# Patient Record
Sex: MALE | Race: WHITE | NOT HISPANIC OR LATINO | Employment: OTHER | URBAN - METROPOLITAN AREA
[De-identification: names, ages, dates, MRNs, and addresses within clinical notes are randomized per-mention and may not be internally consistent; named-entity substitution may affect disease eponyms.]

---

## 2017-01-05 RX ORDER — ACYCLOVIR 400 MG/1
400 TABLET ORAL 3 TIMES DAILY
Qty: 15 TABLET | Refills: 0 | Status: SHIPPED | OUTPATIENT
Start: 2017-01-05 | End: 2017-02-17 | Stop reason: SDUPTHER

## 2017-01-20 RX ORDER — LANCETS 28 GAUGE
EACH MISCELLANEOUS
Qty: 90 EACH | Refills: 3 | Status: SHIPPED | OUTPATIENT
Start: 2017-01-20 | End: 2017-01-31 | Stop reason: SDUPTHER

## 2017-01-23 ENCOUNTER — TELEPHONE (OUTPATIENT)
Dept: INTERNAL MEDICINE | Facility: CLINIC | Age: 57
End: 2017-01-23

## 2017-01-23 NOTE — TELEPHONE ENCOUNTER
----- Message from Tess Mayes sent at 1/23/2017  9:28 AM EST -----  THE PATIENT IS STATING THAT HIS INSURANCE WILL NOT COVER THE MEDICATION JANUVIA AND WOULD LIKE FOR YOU TO CALL SOMETHING ELSE IN FOR HIM. THE PATIENT CAN B E REACHED [phone]

## 2017-01-26 DIAGNOSIS — E11.42 DIABETIC PERIPHERAL NEUROPATHY (HCC): ICD-10-CM

## 2017-01-26 RX ORDER — GABAPENTIN 400 MG/1
CAPSULE ORAL
Qty: 180 CAPSULE | Refills: 5 | Status: SHIPPED | OUTPATIENT
Start: 2017-01-26 | End: 2017-07-31 | Stop reason: SDUPTHER

## 2017-01-27 RX ORDER — TAMSULOSIN HYDROCHLORIDE 0.4 MG/1
CAPSULE ORAL
Qty: 180 CAPSULE | Refills: 1 | Status: SHIPPED | OUTPATIENT
Start: 2017-01-27 | End: 2017-07-31 | Stop reason: SDUPTHER

## 2017-01-31 ENCOUNTER — OFFICE VISIT (OUTPATIENT)
Dept: INTERNAL MEDICINE | Facility: CLINIC | Age: 57
End: 2017-01-31

## 2017-01-31 VITALS
DIASTOLIC BLOOD PRESSURE: 64 MMHG | WEIGHT: 256.8 LBS | HEART RATE: 87 BPM | SYSTOLIC BLOOD PRESSURE: 100 MMHG | BODY MASS INDEX: 34.78 KG/M2 | OXYGEN SATURATION: 98 % | HEIGHT: 72 IN

## 2017-01-31 DIAGNOSIS — E03.9 ACQUIRED HYPOTHYROIDISM: ICD-10-CM

## 2017-01-31 DIAGNOSIS — I10 ESSENTIAL HYPERTENSION: ICD-10-CM

## 2017-01-31 DIAGNOSIS — K62.89 RECTAL PAIN: ICD-10-CM

## 2017-01-31 DIAGNOSIS — E55.9 VITAMIN D DEFICIENCY: ICD-10-CM

## 2017-01-31 DIAGNOSIS — Z11.59 SCREENING FOR VIRAL DISEASE: ICD-10-CM

## 2017-01-31 DIAGNOSIS — J44.1 CHRONIC OBSTRUCTIVE PULMONARY DISEASE WITH ACUTE EXACERBATION (HCC): ICD-10-CM

## 2017-01-31 DIAGNOSIS — E11.69 TYPE 2 DIABETES MELLITUS WITH OTHER SPECIFIED COMPLICATION (HCC): Primary | ICD-10-CM

## 2017-01-31 PROCEDURE — 99213 OFFICE O/P EST LOW 20 MIN: CPT | Performed by: INTERNAL MEDICINE

## 2017-01-31 RX ORDER — LANCETS 28 GAUGE
EACH MISCELLANEOUS
Qty: 100 EACH | Refills: 3 | Status: SHIPPED | OUTPATIENT
Start: 2017-01-31 | End: 2019-12-23 | Stop reason: SDUPTHER

## 2017-01-31 RX ORDER — LEVOTHYROXINE SODIUM 88 UG/1
88 TABLET ORAL DAILY
Qty: 90 TABLET | Refills: 1 | Status: SHIPPED | OUTPATIENT
Start: 2017-01-31 | End: 2017-08-01 | Stop reason: SDUPTHER

## 2017-01-31 NOTE — PROGRESS NOTES
Diabetes (started tradjenta on the 23rd)    Subjective   Carmelo Saucedo is a 56 y.o. male is here today for follow-up.    History of Present Illness   Carmelo is here for a follow up on his DM2, was restarted on Januvia last visit, but since not covered , was switched to Tradjenta. Sugars are much better on that.  He reports increased HA and stress, anxiety and depression, followed by Psychiatry.  Saw opthalmology, having neck pain, unsure if causing his headaches.  Needs DM supplies, adv. To have pharmacy send it again.  Colonoscopy scheduled for Feb 24th. Having a lot of pain in his rectum      Current Outpatient Prescriptions:   •  acyclovir (ZOVIRAX) 400 MG tablet, Take 1 tablet by mouth 3 (Three) Times a Day. Take no more than 5 doses a day., Disp: 15 tablet, Rfl: 0  •  AFLURIA PRESERVATIVE FREE 0.5 ML suspension prefilled syringe, inject 0.5 milliliter intramuscularly, Disp: , Rfl: 0  •  albuterol (ACCUNEB) 1.25 MG/3ML nebulizer solution, Take 3 mL by nebulization every 6 (six) hours as needed for wheezing., Disp: 120 vial, Rfl: 5  •  atorvastatin (LIPITOR) 20 MG tablet, Take 1 tablet by mouth daily., Disp: 90 tablet, Rfl: 1  •  budesonide-formoterol (SYMBICORT) 160-4.5 MCG/ACT inhaler, Inhale 2 puffs 2 (Two) Times a Day. Rinse mouth after use., Disp: 3 inhaler, Rfl: 1  •  Canagliflozin (INVOKANA) 300 MG tablet, Take 300 mg by mouth Daily., Disp: 90 tablet, Rfl: 1  •  Cholecalciferol (VITAMIN D3) 5000 UNITS capsule capsule, Take 1 capsule by mouth daily., Disp: 30 capsule, Rfl: 5  •  clonazePAM (KlonoPIN) 0.5 MG tablet, Take 1 tablet by mouth 3 (three) times a day., Disp: , Rfl: 0  •  gabapentin (NEURONTIN) 400 MG capsule, take 2 capsules by mouth three times a day, Disp: 180 capsule, Rfl: 5  •  glucose blood (NADEEM CONTOUR NEXT TEST) test strip, 1 strip daily., Disp: , Rfl:   •  hydrochlorothiazide (MICROZIDE) 12.5 MG capsule, Take 1 capsule by mouth daily., Disp: 90 capsule, Rfl: 1  •  ipratropium-albuterol  (COMBIVENT RESPIMAT)  MCG/ACT inhaler, Inhale 1 puff 4 (Four) Times a Day., Disp: 12 g, Rfl: 1  •  Lancets (FREESTYLE) lancets, FOR ONCE DAILY TESTING, Disp: 90 each, Rfl: 3  •  levothyroxine (SYNTHROID, LEVOTHROID) 88 MCG tablet, Take 1 tablet by mouth Daily. Take 1 tablet daily on empty stomach preferably take 1/2 tablet 1 hour before breakfast., Disp: 90 tablet, Rfl: 1  •  linagliptin (TRADJENTA) 5 MG tablet tablet, Take 1 tablet by mouth Daily., Disp: 30 tablet, Rfl: 0  •  lisinopril (PRINIVIL,ZESTRIL) 5 MG tablet, Take 1 tablet by mouth Daily., Disp: 90 tablet, Rfl: 1  •  lithium 600 MG capsule, Take 1 capsule by mouth 3 (Three) Times a Day., Disp: , Rfl: 0  •  metFORMIN (GLUCOPHAGE) 1000 MG tablet, take 1.5 tablet every morning and take 1 tablet IN THE EVENING with food, Disp: 225 tablet, Rfl: 1  •  metoprolol succinate XL (TOPROL-XL) 50 MG 24 hr tablet, Take 1 tablet by mouth daily., Disp: 90 tablet, Rfl: 1  •  nitroglycerin (NITROSTAT) 0.4 MG SL tablet, Place 1 tablet under the tongue every 5 (five) minutes. Only take up to 3 tablets. Call 911 if pain persists., Disp: , Rfl:   •  pantoprazole (PROTONIX) 40 MG EC tablet, Take 1 tablet by mouth Daily., Disp: 90 tablet, Rfl: 1  •  pregabalin (LYRICA) 100 MG capsule, Take 1 capsule by mouth 2 (two) times a day for 180 days. Fill on or after 10/31/16, Disp: 180 capsule, Rfl: 1  •  QUEtiapine (SEROquel) 400 MG tablet, Take 2 tablets by mouth every night., Disp: , Rfl:   •  tamsulosin (FLOMAX) 0.4 MG capsule 24 hr capsule, take 2 capsules by mouth every NIGHT, Disp: 180 capsule, Rfl: 1  •  traZODone (DESYREL) 50 MG tablet, Take 1 tablet by mouth every night., Disp: , Rfl:   •  venlafaxine XR (EFFEXOR-XR) 150 MG 24 hr capsule, Take 1 capsule by mouth daily., Disp: , Rfl:   •  venlafaxine XR (EFFEXOR-XR) 75 MG 24 hr capsule, , Disp: , Rfl:   •  hydrocortisone (ANUSOL-HC) 2.5 % rectal cream, Insert  into the rectum 2 (Two) Times a Day., Disp: 30 g, Rfl:  "3      The following portions of the patient's history were reviewed and updated as appropriate: allergies, current medications, past family history, past medical history, past social history, past surgical history and problem list.    Review of Systems   Constitutional: Negative.  Negative for chills and fever.   HENT: Negative for ear discharge, ear pain, sinus pressure and sore throat.    Respiratory: Positive for wheezing. Negative for cough, chest tightness and shortness of breath.    Cardiovascular: Negative for chest pain, palpitations and leg swelling.   Gastrointestinal: Negative for diarrhea, nausea and vomiting.   Musculoskeletal: Positive for arthralgias and neck pain. Negative for back pain and myalgias.   Neurological: Positive for headaches. Negative for dizziness and syncope.   Psychiatric/Behavioral: Positive for sleep disturbance. Negative for confusion. The patient is nervous/anxious.        Objective   Visit Vitals   • /64   • Pulse 87   • Ht 72\" (182.9 cm)   • Wt 256 lb 12.8 oz (116 kg)   • SpO2 98%  Comment: ra   • BMI 34.83 kg/m2     Physical Exam   Constitutional: He is oriented to person, place, and time. He appears well-developed and well-nourished.   HENT:   Head: Normocephalic and atraumatic.   Right Ear: External ear normal.   Left Ear: External ear normal.   Mouth/Throat: No oropharyngeal exudate.   Eyes: Conjunctivae are normal. Pupils are equal, round, and reactive to light.   Neck: Neck supple. No thyromegaly present.   Cardiovascular: Normal rate and regular rhythm.    Pulmonary/Chest: Effort normal and breath sounds normal.   Abdominal: Soft. Bowel sounds are normal. He exhibits no distension. There is no tenderness.   Musculoskeletal: He exhibits tenderness. He exhibits no edema.   Neurological: He is alert and oriented to person, place, and time. No cranial nerve deficit.   Skin: Skin is warm and dry.   Psychiatric: He has a normal mood and affect. Judgment normal. "   Nursing note and vitals reviewed.        Results for orders placed or performed in visit on 12/20/16   POC Glycated Hemoglobin, Total   Result Value Ref Range    Hemoglobin A1C 8.7 %    Lot Number [id number]     Expiration Date 6/2018              Assessment/Plan   Diagnoses and all orders for this visit:    Type 2 diabetes mellitus with other specified complication    Chronic obstructive pulmonary disease with acute exacerbation  -     ipratropium-albuterol (COMBIVENT RESPIMAT)  MCG/ACT inhaler; Inhale 1 puff 4 (Four) Times a Day.    Essential hypertension  -     Comprehensive Metabolic Panel; Future  -     Lipid Panel; Future    Acquired hypothyroidism  -     levothyroxine (SYNTHROID, LEVOTHROID) 88 MCG tablet; Take 1 tablet by mouth Daily. Take 1 tablet daily on empty stomach preferably take 1/2 tablet 1 hour before breakfast.  -     TSH; Future  -     T4, Free; Future    Rectal pain  Comments:  Anusol cream , adv. to use prn sparingly.  Orders:  -     hydrocortisone (ANUSOL-HC) 2.5 % rectal cream; Insert  into the rectum 2 (Two) Times a Day.  -     CBC (No Diff); Future    Vitamin D deficiency  -     Vitamin D 25 Hydroxy; Future    Screening for viral disease  -     Hepatitis C Antibody; Future               Return in about 3 months (around 4/30/2017) for Medicare Wellness.

## 2017-01-31 NOTE — MR AVS SNAPSHOT
Carmelo Saucedo   1/31/2017 8:15 AM   Office Visit    Dept Phone:  [phone]   Encounter #:  [id number]    Provider:  Gladys Tello MD   Department:  [facility] INTERNAL MEDICINE AND ENDOCRINOLOGY [place]                Your Full Care Plan              Today's Medication Changes          These changes are accurate as of: 1/31/17  9:19 AM.  If you have any questions, ask your nurse or doctor.               New Medication(s)Ordered:     hydrocortisone 2.5 % rectal cream   Commonly known as:  ANUSOL-HC   Insert  into the rectum 2 (Two) Times a Day.         Stop taking medication(s)listed here:     doxycycline 100 MG capsule   Commonly known as:  VIBRAMYCIN                Where to Get Your Medications      These medications were sent to [address] [phone] [place] [id number] [address] [address]     Phone:  [phone]     hydrocortisone 2.5 % rectal cream    ipratropium-albuterol  MCG/ACT inhaler    levothyroxine 88 MCG tablet                  Your Updated Medication List          This list is accurate as of: 1/31/17  9:19 AM.  Always use your most recent med list.                acyclovir 400 MG tablet   Commonly known as:  ZOVIRAX   Take 1 tablet by mouth 3 (Three) Times a Day. Take no more than 5 doses a day.       AFLURIA PRESERVATIVE FREE 0.5 ML suspension prefilled syringe   Generic drug:  influenza virus vacc split PF       albuterol 1.25 MG/3ML nebulizer solution   Commonly known as:  ACCUNEB   Take 3 mL by nebulization every 6 (six) hours as needed for wheezing.       atorvastatin 20 MG tablet   Commonly known as:  LIPITOR   Take 1 tablet by mouth daily.       NADEEM CONTOUR NEXT TEST test strip   Generic drug:  glucose blood       budesonide-formoterol 160-4.5 MCG/ACT inhaler   Commonly known as:  SYMBICORT   Inhale 2 puffs 2 (Two) Times a Day. Rinse mouth after  use.       Canagliflozin 300 MG tablet   Commonly known as:  INVOKANA   Take 300 mg by mouth Daily.       clonazePAM 0.5 MG tablet   Commonly known as:  KlonoPIN       freestyle lancets   FOR ONCE DAILY TESTING       gabapentin 400 MG capsule   Commonly known as:  NEURONTIN   take 2 capsules by mouth three times a day       hydrochlorothiazide 12.5 MG capsule   Commonly known as:  MICROZIDE   Take 1 capsule by mouth daily.       hydrocortisone 2.5 % rectal cream   Commonly known as:  ANUSOL-HC   Insert  into the rectum 2 (Two) Times a Day.       ipratropium-albuterol  MCG/ACT inhaler   Commonly known as:  COMBIVENT RESPIMAT   Inhale 1 puff 4 (Four) Times a Day.       levothyroxine 88 MCG tablet   Commonly known as:  SYNTHROID, LEVOTHROID   Take 1 tablet by mouth Daily. Take 1 tablet daily on empty stomach preferably take 1/2 tablet 1 hour before breakfast.       linagliptin 5 MG tablet tablet   Commonly known as:  TRADJENTA   Take 1 tablet by mouth Daily.       lisinopril 5 MG tablet   Commonly known as:  PRINIVIL,ZESTRIL   Take 1 tablet by mouth Daily.       lithium 600 MG capsule       metFORMIN 1000 MG tablet   Commonly known as:  GLUCOPHAGE   take 1.5 tablet every morning and take 1 tablet IN THE EVENING with food       metoprolol succinate XL 50 MG 24 hr tablet   Commonly known as:  TOPROL-XL   Take 1 tablet by mouth daily.       NITROSTAT 0.4 MG SL tablet   Generic drug:  nitroglycerin       pantoprazole 40 MG EC tablet   Commonly known as:  PROTONIX   Take 1 tablet by mouth Daily.       pregabalin 100 MG capsule   Commonly known as:  LYRICA   Take 1 capsule by mouth 2 (two) times a day for 180 days. Fill on or after 10/31/16       QUEtiapine 400 MG tablet   Commonly known as:  SEROquel       tamsulosin 0.4 MG capsule 24 hr capsule   Commonly known as:  FLOMAX   take 2 capsules by mouth every NIGHT       traZODone 50 MG tablet   Commonly known as:  DESYREL       * venlafaxine  MG 24 hr capsule    Commonly known as:  EFFEXOR-XR       * venlafaxine XR 75 MG 24 hr capsule   Commonly known as:  EFFEXOR-XR       vitamin D3 5000 UNITS capsule capsule   Take 1 capsule by mouth daily.       * Notice:  This list has 2 medication(s) that are the same as other medications prescribed for you. Read the directions carefully, and ask your doctor or other care provider to review them with you.            You Were Diagnosed With        Codes Comments    Type 2 diabetes mellitus with other specified complication    -  Primary ICD-10-CM: E11.69  ICD-9-CM: 250.80     Chronic obstructive pulmonary disease with acute exacerbation     ICD-10-CM: J44.1  ICD-9-CM: 491.21     Essential hypertension     ICD-10-CM: I10  ICD-9-CM: 401.9     Acquired hypothyroidism     ICD-10-CM: E03.9  ICD-9-CM: 244.9     Rectal pain     ICD-10-CM: K62.89  ICD-9-CM: 569.42 Anusol cream , adv. to use prn sparingly.    Vitamin D deficiency     ICD-10-CM: E55.9  ICD-9-CM: 268.9     Screening for viral disease     ICD-10-CM: Z11.59  ICD-9-CM: V73.99       Instructions     None    Patient Instructions History      Upcoming Appointments     Visit Type Date Time Department    FOLLOW UP 1/31/2017  8:15 AM E  [place]    SUBSEQUENT MEDICARE WELLNESS 5/2/2017  9:45 AM [facility]     Our records indicate that you have [facility]. If you would like to sign up for [employer], please email [email or web address] or call [phone] to obtain an activation code.             Other Info from Your Visit           Your Appointments     May 02, 2017  9:45 AM EDT   Subsequent Medicare Wellness with Gladys Tello MD   [facility] INTERNAL MEDICINE AND ENDOCRINOLOGY [place] (--)    [address] [address] [phone]              Allergies     No Known Allergies      Reason for Visit     Diabetes started tradjenta on the 23rd      Vital Signs     Blood Pressure Pulse Height Weight  "Oxygen Saturation Body Mass Index    100/64 87 72\" (182.9 cm) 256 lb 12.8 oz (116 kg) 98% 34.83 kg/m2    Smoking Status                   Current Some Day Smoker           Problems and Diagnoses Noted     Chronic airway obstruction    High blood pressure    Underactive thyroid    Type 2 diabetes    Rectal pain        Vitamin D deficiency        Screening for viral disease            "

## 2017-02-16 RX ORDER — HYDROCHLOROTHIAZIDE 12.5 MG/1
CAPSULE, GELATIN COATED ORAL
Qty: 90 CAPSULE | Refills: 1 | Status: SHIPPED | OUTPATIENT
Start: 2017-02-16 | End: 2017-08-09 | Stop reason: SDUPTHER

## 2017-02-16 RX ORDER — ATORVASTATIN CALCIUM 20 MG/1
TABLET, FILM COATED ORAL
Qty: 90 TABLET | Refills: 1 | Status: SHIPPED | OUTPATIENT
Start: 2017-02-16 | End: 2017-08-09 | Stop reason: SDUPTHER

## 2017-02-17 RX ORDER — ACYCLOVIR 400 MG/1
TABLET ORAL
Qty: 15 TABLET | Refills: 0 | Status: SHIPPED | OUTPATIENT
Start: 2017-02-17 | End: 2017-04-03 | Stop reason: SDUPTHER

## 2017-02-22 ENCOUNTER — TELEPHONE (OUTPATIENT)
Dept: INTERNAL MEDICINE | Facility: CLINIC | Age: 57
End: 2017-02-22

## 2017-02-22 NOTE — TELEPHONE ENCOUNTER
----- Message from Marcie Ayers sent at 2/21/2017  8:57 AM EST -----  Contact: GENET WITH [facility] PHARMACY DEPARTMENT  SHE IS CALLING TO LET YOU KNOW THERE IS A PRIOR AUTH IN THE WORKS FOR PATIENTS METFORMIN MEDICATION. SHE DOES HAVE A FEW QUESTIONS SHE NEEDS TO ASK YOU ABOUT PATIENTS HISTORY. YOU CAN CONTACT GENET BACK [phone] THE ORDER NUMBER IS [id number].

## 2017-02-27 RX ORDER — LINAGLIPTIN 5 MG/1
TABLET, FILM COATED ORAL
Qty: 30 TABLET | Refills: 0 | Status: SHIPPED | OUTPATIENT
Start: 2017-02-27 | End: 2017-05-02

## 2017-04-03 RX ORDER — ACYCLOVIR 400 MG/1
400 TABLET ORAL 3 TIMES DAILY
Qty: 15 TABLET | Refills: 0 | Status: SHIPPED | OUTPATIENT
Start: 2017-04-03 | End: 2017-05-10 | Stop reason: SDUPTHER

## 2017-04-25 ENCOUNTER — LAB (OUTPATIENT)
Dept: INTERNAL MEDICINE | Facility: CLINIC | Age: 57
End: 2017-04-25

## 2017-04-25 DIAGNOSIS — K62.89 RECTAL PAIN: ICD-10-CM

## 2017-04-25 DIAGNOSIS — I10 ESSENTIAL HYPERTENSION: ICD-10-CM

## 2017-04-25 DIAGNOSIS — E03.9 ACQUIRED HYPOTHYROIDISM: ICD-10-CM

## 2017-04-25 DIAGNOSIS — Z11.59 SCREENING FOR VIRAL DISEASE: ICD-10-CM

## 2017-04-25 DIAGNOSIS — E55.9 VITAMIN D DEFICIENCY: ICD-10-CM

## 2017-04-25 LAB
25(OH)D3 SERPL-MCNC: 25.7 NG/ML
ALBUMIN SERPL-MCNC: 4.5 G/DL (ref 3.2–4.8)
ALBUMIN/GLOB SERPL: 1.6 G/DL (ref 1.5–2.5)
ALP SERPL-CCNC: 84 U/L (ref 25–100)
ALT SERPL W P-5'-P-CCNC: 22 U/L (ref 7–40)
ANION GAP SERPL CALCULATED.3IONS-SCNC: 6 MMOL/L (ref 3–11)
ARTICHOKE IGE QN: 58 MG/DL (ref 0–130)
AST SERPL-CCNC: 19 U/L (ref 0–33)
BILIRUB SERPL-MCNC: 0.4 MG/DL (ref 0.3–1.2)
BUN BLD-MCNC: 17 MG/DL (ref 9–23)
BUN/CREAT SERPL: 13.1 (ref 7–25)
CALCIUM SPEC-SCNC: 10.4 MG/DL (ref 8.7–10.4)
CHLORIDE SERPL-SCNC: 104 MMOL/L (ref 99–109)
CHOLEST SERPL-MCNC: 120 MG/DL (ref 0–200)
CO2 SERPL-SCNC: 30 MMOL/L (ref 20–31)
CREAT BLD-MCNC: 1.3 MG/DL (ref 0.6–1.3)
DEPRECATED RDW RBC AUTO: 48.8 FL (ref 37–54)
ERYTHROCYTE [DISTWIDTH] IN BLOOD BY AUTOMATED COUNT: 14.3 % (ref 11.3–14.5)
GFR SERPL CREATININE-BSD FRML MDRD: 57 ML/MIN/1.73
GLOBULIN UR ELPH-MCNC: 2.8 GM/DL
GLUCOSE BLD-MCNC: 123 MG/DL (ref 70–100)
HCT VFR BLD AUTO: 50.3 % (ref 38.9–50.9)
HCV AB SER DONR QL: NORMAL
HDLC SERPL-MCNC: 25 MG/DL (ref 40–60)
HGB BLD-MCNC: 16.6 G/DL (ref 13.1–17.5)
MCH RBC QN AUTO: 30.9 PG (ref 27–31)
MCHC RBC AUTO-ENTMCNC: 33 G/DL (ref 32–36)
MCV RBC AUTO: 93.5 FL (ref 80–99)
PLATELET # BLD AUTO: 237 10*3/MM3 (ref 150–450)
PMV BLD AUTO: 12.2 FL (ref 6–12)
POTASSIUM BLD-SCNC: 4.8 MMOL/L (ref 3.5–5.5)
PROT SERPL-MCNC: 7.3 G/DL (ref 5.7–8.2)
RBC # BLD AUTO: 5.38 10*6/MM3 (ref 4.2–5.76)
SODIUM BLD-SCNC: 140 MMOL/L (ref 132–146)
T4 FREE SERPL-MCNC: 1.19 NG/DL (ref 0.89–1.76)
TRIGL SERPL-MCNC: 211 MG/DL (ref 0–150)
TSH SERPL DL<=0.05 MIU/L-ACNC: 2.74 MIU/ML (ref 0.35–5.35)
WBC NRBC COR # BLD: 10.35 10*3/MM3 (ref 3.5–10.8)

## 2017-04-25 PROCEDURE — 84443 ASSAY THYROID STIM HORMONE: CPT | Performed by: INTERNAL MEDICINE

## 2017-04-25 PROCEDURE — 84439 ASSAY OF FREE THYROXINE: CPT | Performed by: INTERNAL MEDICINE

## 2017-04-25 PROCEDURE — 82306 VITAMIN D 25 HYDROXY: CPT | Performed by: INTERNAL MEDICINE

## 2017-04-25 PROCEDURE — 85027 COMPLETE CBC AUTOMATED: CPT | Performed by: INTERNAL MEDICINE

## 2017-04-25 PROCEDURE — 80053 COMPREHEN METABOLIC PANEL: CPT | Performed by: INTERNAL MEDICINE

## 2017-04-25 PROCEDURE — 80061 LIPID PANEL: CPT | Performed by: INTERNAL MEDICINE

## 2017-04-25 PROCEDURE — 86803 HEPATITIS C AB TEST: CPT | Performed by: INTERNAL MEDICINE

## 2017-04-27 RX ORDER — CANAGLIFLOZIN 300 MG/1
TABLET, FILM COATED ORAL
Qty: 90 TABLET | Refills: 1 | Status: SHIPPED | OUTPATIENT
Start: 2017-04-27 | End: 2017-10-23 | Stop reason: SDUPTHER

## 2017-04-27 RX ORDER — METOPROLOL SUCCINATE 50 MG/1
TABLET, EXTENDED RELEASE ORAL
Qty: 90 TABLET | Refills: 1 | Status: SHIPPED | OUTPATIENT
Start: 2017-04-27 | End: 2017-10-23 | Stop reason: SDUPTHER

## 2017-05-02 ENCOUNTER — OFFICE VISIT (OUTPATIENT)
Dept: INTERNAL MEDICINE | Facility: CLINIC | Age: 57
End: 2017-05-02

## 2017-05-02 ENCOUNTER — TELEPHONE (OUTPATIENT)
Dept: INTERNAL MEDICINE | Facility: CLINIC | Age: 57
End: 2017-05-02

## 2017-05-02 VITALS
DIASTOLIC BLOOD PRESSURE: 82 MMHG | BODY MASS INDEX: 34.46 KG/M2 | OXYGEN SATURATION: 95 % | HEART RATE: 79 BPM | WEIGHT: 254.4 LBS | HEIGHT: 72 IN | SYSTOLIC BLOOD PRESSURE: 128 MMHG

## 2017-05-02 DIAGNOSIS — N40.0 ENLARGED PROSTATE: ICD-10-CM

## 2017-05-02 DIAGNOSIS — Z87.891 H/O TOBACCO USE, PRESENTING HAZARDS TO HEALTH: ICD-10-CM

## 2017-05-02 DIAGNOSIS — E11.42 DIABETIC PERIPHERAL NEUROPATHY (HCC): ICD-10-CM

## 2017-05-02 DIAGNOSIS — J44.1 CHRONIC OBSTRUCTIVE PULMONARY DISEASE WITH ACUTE EXACERBATION (HCC): ICD-10-CM

## 2017-05-02 DIAGNOSIS — Z00.00 MEDICARE ANNUAL WELLNESS VISIT, SUBSEQUENT: Primary | ICD-10-CM

## 2017-05-02 DIAGNOSIS — E03.9 ACQUIRED HYPOTHYROIDISM: ICD-10-CM

## 2017-05-02 DIAGNOSIS — Z23 ENCOUNTER FOR IMMUNIZATION: ICD-10-CM

## 2017-05-02 DIAGNOSIS — I10 ESSENTIAL HYPERTENSION: ICD-10-CM

## 2017-05-02 DIAGNOSIS — M17.12 PRIMARY OSTEOARTHRITIS OF LEFT KNEE: ICD-10-CM

## 2017-05-02 DIAGNOSIS — E11.69 TYPE 2 DIABETES MELLITUS WITH OTHER SPECIFIED COMPLICATION (HCC): ICD-10-CM

## 2017-05-02 DIAGNOSIS — G43.909 MIGRAINE WITHOUT STATUS MIGRAINOSUS, NOT INTRACTABLE, UNSPECIFIED MIGRAINE TYPE: ICD-10-CM

## 2017-05-02 LAB
HBA1C MFR BLD: 6.8 %
POC CREATININE URINE: 50
POC MICROALBUMIN URINE: 10

## 2017-05-02 PROCEDURE — 82570 ASSAY OF URINE CREATININE: CPT | Performed by: INTERNAL MEDICINE

## 2017-05-02 PROCEDURE — G0444 DEPRESSION SCREEN ANNUAL: HCPCS | Performed by: INTERNAL MEDICINE

## 2017-05-02 PROCEDURE — G0439 PPPS, SUBSEQ VISIT: HCPCS | Performed by: INTERNAL MEDICINE

## 2017-05-02 PROCEDURE — 82044 UR ALBUMIN SEMIQUANTITATIVE: CPT | Performed by: INTERNAL MEDICINE

## 2017-05-02 PROCEDURE — 83036 HEMOGLOBIN GLYCOSYLATED A1C: CPT | Performed by: INTERNAL MEDICINE

## 2017-05-02 PROCEDURE — 99213 OFFICE O/P EST LOW 20 MIN: CPT | Performed by: INTERNAL MEDICINE

## 2017-05-02 PROCEDURE — G0009 ADMIN PNEUMOCOCCAL VACCINE: HCPCS | Performed by: INTERNAL MEDICINE

## 2017-05-02 PROCEDURE — 90732 PPSV23 VACC 2 YRS+ SUBQ/IM: CPT | Performed by: INTERNAL MEDICINE

## 2017-05-02 RX ORDER — CYCLOBENZAPRINE HCL 10 MG
10 TABLET ORAL NIGHTLY
Qty: 30 TABLET | Refills: 3 | Status: SHIPPED | OUTPATIENT
Start: 2017-05-02 | End: 2017-08-09 | Stop reason: SDUPTHER

## 2017-05-02 RX ORDER — PREGABALIN 100 MG/1
100 CAPSULE ORAL 2 TIMES DAILY
Qty: 180 CAPSULE | Refills: 1 | Status: SHIPPED | OUTPATIENT
Start: 2017-05-02 | End: 2017-10-23 | Stop reason: SDUPTHER

## 2017-05-02 RX ORDER — VARENICLINE TARTRATE 1 MG/1
1 TABLET, FILM COATED ORAL 2 TIMES DAILY
Qty: 60 TABLET | Refills: 1 | Status: SHIPPED | OUTPATIENT
Start: 2017-05-02 | End: 2017-08-09

## 2017-05-02 RX ORDER — ERGOCALCIFEROL 1.25 MG/1
50000 CAPSULE ORAL WEEKLY
Qty: 4 CAPSULE | Refills: 3 | Status: SHIPPED | OUTPATIENT
Start: 2017-05-02 | End: 2018-06-26 | Stop reason: SDDI

## 2017-05-02 RX ORDER — TOPIRAMATE 50 MG/1
TABLET, FILM COATED ORAL
Qty: 30 TABLET | Refills: 3 | Status: SHIPPED | OUTPATIENT
Start: 2017-05-02 | End: 2017-08-09 | Stop reason: SDUPTHER

## 2017-05-05 ENCOUNTER — HOSPITAL ENCOUNTER (OUTPATIENT)
Dept: CT IMAGING | Facility: HOSPITAL | Age: 57
Discharge: HOME | End: 2017-05-05
Attending: INTERNAL MEDICINE | Admitting: INTERNAL MEDICINE

## 2017-05-05 PROCEDURE — G0297 LDCT FOR LUNG CA SCREEN: HCPCS

## 2017-05-10 RX ORDER — ACYCLOVIR 400 MG/1
TABLET ORAL
Qty: 15 TABLET | Refills: 0 | Status: SHIPPED | OUTPATIENT
Start: 2017-05-10 | End: 2017-05-15 | Stop reason: SDUPTHER

## 2017-05-15 ENCOUNTER — OFFICE VISIT (OUTPATIENT)
Dept: INTERNAL MEDICINE | Facility: CLINIC | Age: 57
End: 2017-05-15

## 2017-05-15 VITALS
OXYGEN SATURATION: 97 % | SYSTOLIC BLOOD PRESSURE: 124 MMHG | BODY MASS INDEX: 34.26 KG/M2 | DIASTOLIC BLOOD PRESSURE: 80 MMHG | HEART RATE: 78 BPM | WEIGHT: 252.6 LBS

## 2017-05-15 DIAGNOSIS — M54.41 CHRONIC BILATERAL LOW BACK PAIN WITH BILATERAL SCIATICA: ICD-10-CM

## 2017-05-15 DIAGNOSIS — M54.42 CHRONIC BILATERAL LOW BACK PAIN WITH BILATERAL SCIATICA: ICD-10-CM

## 2017-05-15 DIAGNOSIS — B00.9 HSV-2 INFECTION: ICD-10-CM

## 2017-05-15 DIAGNOSIS — J44.1 CHRONIC OBSTRUCTIVE PULMONARY DISEASE WITH ACUTE EXACERBATION (HCC): Primary | ICD-10-CM

## 2017-05-15 DIAGNOSIS — R53.83 FATIGUE, UNSPECIFIED TYPE: ICD-10-CM

## 2017-05-15 DIAGNOSIS — G89.29 CHRONIC BILATERAL LOW BACK PAIN WITH BILATERAL SCIATICA: ICD-10-CM

## 2017-05-15 PROCEDURE — 99213 OFFICE O/P EST LOW 20 MIN: CPT | Performed by: INTERNAL MEDICINE

## 2017-05-15 RX ORDER — ACYCLOVIR 400 MG/1
800 TABLET ORAL 3 TIMES DAILY
Qty: 30 TABLET | Refills: 0 | Status: SHIPPED | OUTPATIENT
Start: 2017-05-15 | End: 2017-09-03 | Stop reason: SDUPTHER

## 2017-05-15 RX ORDER — CLONAZEPAM 1 MG/1
0.25 TABLET ORAL 2 TIMES DAILY
Refills: 0 | COMMUNITY
Start: 2017-04-20 | End: 2020-06-30

## 2017-05-15 RX ORDER — MELOXICAM 15 MG/1
1 TABLET ORAL DAILY
Refills: 0 | COMMUNITY
Start: 2017-02-14 | End: 2017-05-15 | Stop reason: SDUPTHER

## 2017-05-15 RX ORDER — MELOXICAM 15 MG/1
TABLET ORAL
Qty: 90 TABLET | Refills: 1 | Status: SHIPPED | OUTPATIENT
Start: 2017-05-15 | End: 2018-06-05 | Stop reason: SDUPTHER

## 2017-05-15 RX ORDER — IPRATROPIUM BROMIDE AND ALBUTEROL SULFATE 2.5; .5 MG/3ML; MG/3ML
3 SOLUTION RESPIRATORY (INHALATION) EVERY 6 HOURS
Qty: 120 VIAL | Refills: 3 | Status: SHIPPED | OUTPATIENT
Start: 2017-05-15 | End: 2018-06-26 | Stop reason: SDUPTHER

## 2017-05-15 RX ORDER — MELOXICAM 15 MG/1
15 TABLET ORAL DAILY
Qty: 90 TABLET | Refills: 1 | Status: SHIPPED | OUTPATIENT
Start: 2017-05-15 | End: 2017-08-09 | Stop reason: SDUPTHER

## 2017-05-19 ENCOUNTER — TELEPHONE (OUTPATIENT)
Dept: INTERNAL MEDICINE | Facility: CLINIC | Age: 57
End: 2017-05-19

## 2017-06-27 RX ORDER — PANTOPRAZOLE SODIUM 40 MG/1
TABLET, DELAYED RELEASE ORAL
Qty: 90 TABLET | Refills: 1 | Status: SHIPPED | OUTPATIENT
Start: 2017-06-27 | End: 2017-12-26 | Stop reason: SDUPTHER

## 2017-06-27 RX ORDER — LISINOPRIL 5 MG/1
TABLET ORAL
Qty: 90 TABLET | Refills: 1 | Status: SHIPPED | OUTPATIENT
Start: 2017-06-27 | End: 2017-12-26 | Stop reason: SDUPTHER

## 2017-07-13 ENCOUNTER — TELEPHONE (OUTPATIENT)
Dept: INTERNAL MEDICINE | Facility: CLINIC | Age: 57
End: 2017-07-13

## 2017-07-13 RX ORDER — NYSTATIN 100000 U/G
CREAM TOPICAL 2 TIMES DAILY
Qty: 15 G | Refills: 0 | Status: SHIPPED | OUTPATIENT
Start: 2017-07-13 | End: 2020-06-23

## 2017-07-13 NOTE — TELEPHONE ENCOUNTER
Pt states that he has a yeast infection on his penis and testicles, has had before and was given a cream and oral medication.  Pt advised he may need to be see, would let him know after discussing with MD.

## 2017-07-13 NOTE — TELEPHONE ENCOUNTER
PT WOULD LIKE FOR DR MINOR TO CALL HIM IN SOME MEDICATION FOR A YEAST INFECTION. HE STATES THAT DR MINOR HAD CALLED HIM SOMETHING IN BEFORE HE COULDN;T REMEMBER WHAT THE MEDICATION WAS CALLED.      [facility] PHARMACY [facility]    PATIENT'S CALL BACK NUMBER [phone]

## 2017-07-31 DIAGNOSIS — E11.42 DIABETIC PERIPHERAL NEUROPATHY (HCC): ICD-10-CM

## 2017-07-31 RX ORDER — TAMSULOSIN HYDROCHLORIDE 0.4 MG/1
CAPSULE ORAL
Qty: 180 CAPSULE | Refills: 1 | Status: SHIPPED | OUTPATIENT
Start: 2017-07-31 | End: 2018-02-05 | Stop reason: SDUPTHER

## 2017-07-31 RX ORDER — GABAPENTIN 400 MG/1
CAPSULE ORAL
Qty: 180 CAPSULE | Refills: 0 | Status: SHIPPED | OUTPATIENT
Start: 2017-07-31 | End: 2017-08-09 | Stop reason: SDUPTHER

## 2017-08-01 DIAGNOSIS — E11.42 DIABETIC PERIPHERAL NEUROPATHY (HCC): ICD-10-CM

## 2017-08-01 DIAGNOSIS — E03.9 ACQUIRED HYPOTHYROIDISM: ICD-10-CM

## 2017-08-01 RX ORDER — GABAPENTIN 400 MG/1
CAPSULE ORAL
Qty: 180 CAPSULE | Refills: 5 | OUTPATIENT
Start: 2017-08-01

## 2017-08-01 RX ORDER — LEVOTHYROXINE SODIUM 88 UG/1
88 TABLET ORAL DAILY
Qty: 90 TABLET | Refills: 1 | Status: SHIPPED | OUTPATIENT
Start: 2017-08-01 | End: 2018-01-19 | Stop reason: SDUPTHER

## 2017-08-09 ENCOUNTER — OFFICE VISIT (OUTPATIENT)
Dept: INTERNAL MEDICINE | Facility: CLINIC | Age: 57
End: 2017-08-09

## 2017-08-09 VITALS
WEIGHT: 246.6 LBS | SYSTOLIC BLOOD PRESSURE: 120 MMHG | BODY MASS INDEX: 33.44 KG/M2 | HEART RATE: 68 BPM | DIASTOLIC BLOOD PRESSURE: 84 MMHG

## 2017-08-09 DIAGNOSIS — E11.42 DIABETIC PERIPHERAL NEUROPATHY (HCC): ICD-10-CM

## 2017-08-09 DIAGNOSIS — E11.69 TYPE 2 DIABETES MELLITUS WITH OTHER SPECIFIED COMPLICATION, WITHOUT LONG-TERM CURRENT USE OF INSULIN (HCC): Primary | ICD-10-CM

## 2017-08-09 DIAGNOSIS — G43.909 MIGRAINE WITHOUT STATUS MIGRAINOSUS, NOT INTRACTABLE, UNSPECIFIED MIGRAINE TYPE: ICD-10-CM

## 2017-08-09 DIAGNOSIS — E78.2 MIXED HYPERLIPIDEMIA: ICD-10-CM

## 2017-08-09 DIAGNOSIS — I10 ESSENTIAL HYPERTENSION: ICD-10-CM

## 2017-08-09 DIAGNOSIS — N48.1 BALANITIS: ICD-10-CM

## 2017-08-09 PROBLEM — E78.5 HYPERLIPIDEMIA: Status: ACTIVE | Noted: 2017-08-09

## 2017-08-09 LAB — HBA1C MFR BLD: 6.7 %

## 2017-08-09 PROCEDURE — 99214 OFFICE O/P EST MOD 30 MIN: CPT | Performed by: INTERNAL MEDICINE

## 2017-08-09 PROCEDURE — 83036 HEMOGLOBIN GLYCOSYLATED A1C: CPT | Performed by: INTERNAL MEDICINE

## 2017-08-09 RX ORDER — ASPIRIN 81 MG/1
162 TABLET, CHEWABLE ORAL DAILY
COMMUNITY

## 2017-08-09 RX ORDER — TOPIRAMATE 100 MG/1
100 TABLET, FILM COATED ORAL NIGHTLY
Qty: 90 TABLET | Refills: 1 | Status: SHIPPED | OUTPATIENT
Start: 2017-08-09 | End: 2018-01-19 | Stop reason: SDUPTHER

## 2017-08-09 RX ORDER — HYDROCHLOROTHIAZIDE 12.5 MG/1
12.5 CAPSULE, GELATIN COATED ORAL EVERY MORNING
Qty: 90 CAPSULE | Refills: 1 | Status: SHIPPED | OUTPATIENT
Start: 2017-08-09 | End: 2018-02-16 | Stop reason: SDUPTHER

## 2017-08-09 RX ORDER — GABAPENTIN 300 MG/1
600 CAPSULE ORAL 3 TIMES DAILY
Qty: 180 CAPSULE | Refills: 4 | Status: SHIPPED | OUTPATIENT
Start: 2017-08-09 | End: 2018-01-19 | Stop reason: SDUPTHER

## 2017-08-09 RX ORDER — ATORVASTATIN CALCIUM 20 MG/1
20 TABLET, FILM COATED ORAL NIGHTLY
Qty: 90 TABLET | Refills: 1 | Status: SHIPPED | OUTPATIENT
Start: 2017-08-09 | End: 2018-01-19 | Stop reason: SDUPTHER

## 2017-08-09 RX ORDER — CYCLOBENZAPRINE HCL 10 MG
10 TABLET ORAL NIGHTLY
Qty: 90 TABLET | Refills: 1 | Status: SHIPPED | OUTPATIENT
Start: 2017-08-09 | End: 2018-03-02 | Stop reason: SDUPTHER

## 2017-08-09 NOTE — PROGRESS NOTES
Diabetes; Hypertension; and Depression (having issues with daughter and her substance abuse)    Subjective   Carmelo Saucedo is a 56 y.o. male is here today for follow-up.    History of Present Illness   Mr. Saucedo is here for a follow up on his DM2, HTN and HLP.   Sugars have been better, as he has been eating better.  Topamax is helping , but not completely,  Would like to     Current Outpatient Prescriptions:   •  acyclovir (ZOVIRAX) 400 MG tablet, Take 2 tablets by mouth 3 (Three) Times a Day., Disp: 30 tablet, Rfl: 0  •  aspirin 81 MG chewable tablet, Chew 81 mg Daily., Disp: , Rfl:   •  atorvastatin (LIPITOR) 20 MG tablet, Take 1 tablet by mouth Every Night., Disp: 90 tablet, Rfl: 1  •  budesonide-formoterol (SYMBICORT) 160-4.5 MCG/ACT inhaler, Inhale 2 puffs 2 (Two) Times a Day. Rinse mouth after use., Disp: 3 inhaler, Rfl: 1  •  Cholecalciferol (VITAMIN D3) 5000 UNITS capsule capsule, Take 1 capsule by mouth daily., Disp: 30 capsule, Rfl: 5  •  clonazePAM (KlonoPIN) 1 MG tablet, Take 1 tablet by mouth 2 (Two) Times a Day., Disp: , Rfl: 0  •  cyclobenzaprine (FLEXERIL) 10 MG tablet, Take 1 tablet by mouth Every Night., Disp: 90 tablet, Rfl: 1  •  gabapentin (NEURONTIN) 300 MG capsule, Take 2 capsules by mouth 3 (Three) Times a Day., Disp: 180 capsule, Rfl: 4  •  glucose blood (NADEEM CONTOUR NEXT TEST) test strip, 1 strip daily., Disp: , Rfl:   •  hydrochlorothiazide (MICROZIDE) 12.5 MG capsule, Take 1 capsule by mouth Every Morning., Disp: 90 capsule, Rfl: 1  •  hydrocortisone (ANUSOL-HC) 2.5 % rectal cream, Insert  into the rectum 2 (Two) Times a Day., Disp: 30 g, Rfl: 3  •  INVOKANA 300 MG tablet, take 1 tablet by mouth once daily, Disp: 90 tablet, Rfl: 1  •  ipratropium-albuterol (COMBIVENT RESPIMAT)  MCG/ACT inhaler, Inhale 1 puff 4 (Four) Times a Day., Disp: 12 g, Rfl: 1  •  ipratropium-albuterol (DUONEB) 0.5-2.5 mg/mL nebulizer, Take 3 mL by nebulization Every 6 (Six) Hours., Disp: 120 vial, Rfl: 3  •   Lancets (FREESTYLE) lancets, FOR ONCE DAILY TESTING, Disp: 100 each, Rfl: 3  •  levothyroxine (SYNTHROID, LEVOTHROID) 88 MCG tablet, Take 1 tablet by mouth Daily. Take 1 tablet daily on empty stomach preferably take 1/2 tablet 1 hour before breakfast., Disp: 90 tablet, Rfl: 1  •  lisinopril (PRINIVIL,ZESTRIL) 5 MG tablet, take 1 tablet by mouth once daily, Disp: 90 tablet, Rfl: 1  •  lithium 600 MG capsule, Take 1 capsule by mouth 3 (Three) Times a Day., Disp: , Rfl: 0  •  meloxicam (MOBIC) 15 MG tablet, take 1 tablet by mouth once daily with food, Disp: 90 tablet, Rfl: 1  •  metFORMIN (GLUCOPHAGE) 1000 MG tablet, Take 1.5 tablets in the morning and 1 tablet at night with food., Disp: 75 tablet, Rfl: 5  •  metoprolol succinate XL (TOPROL-XL) 50 MG 24 hr tablet, take 1 tablet by mouth once daily, Disp: 90 tablet, Rfl: 1  •  nitroglycerin (NITROSTAT) 0.4 MG SL tablet, Place 1 tablet under the tongue every 5 (five) minutes. Only take up to 3 tablets. Call 911 if pain persists., Disp: , Rfl:   •  pantoprazole (PROTONIX) 40 MG EC tablet, take 1 tablet by mouth once daily, Disp: 90 tablet, Rfl: 1  •  pregabalin (LYRICA) 100 MG capsule, Take 1 capsule by mouth 2 (Two) Times a Day., Disp: 180 capsule, Rfl: 1  •  QUEtiapine (SEROquel) 400 MG tablet, Take 2 tablets by mouth every night., Disp: , Rfl:   •  tamsulosin (FLOMAX) 0.4 MG capsule 24 hr capsule, take 2 capsules by mouth at bedtime, Disp: 180 capsule, Rfl: 1  •  topiramate (TOPAMAX) 100 MG tablet, Take 1 tablet by mouth Every Night., Disp: 90 tablet, Rfl: 1  •  traZODone (DESYREL) 50 MG tablet, Take 1 tablet by mouth every night., Disp: , Rfl:   •  venlafaxine XR (EFFEXOR-XR) 150 MG 24 hr capsule, Take 1 capsule by mouth daily., Disp: , Rfl:   •  venlafaxine XR (EFFEXOR-XR) 75 MG 24 hr capsule, , Disp: , Rfl:   •  vitamin D (ERGOCALCIFEROL) 57569 UNITS capsule capsule, Take 1 capsule by mouth 1 (One) Time Per Week., Disp: 4 capsule, Rfl: 3  •  nystatin (MYCOSTATIN)  444807 UNIT/GM cream, Apply  topically 2 (Two) Times a Day., Disp: 15 g, Rfl: 0      The following portions of the patient's history were reviewed and updated as appropriate: allergies, current medications, past family history, past medical history, past social history, past surgical history and problem list.    Review of Systems   Constitutional: Negative.  Negative for chills and fever.   HENT: Negative for ear discharge, ear pain, sinus pressure and sore throat.    Respiratory: Positive for wheezing. Negative for cough, chest tightness and shortness of breath.    Cardiovascular: Negative for chest pain, palpitations and leg swelling.   Gastrointestinal: Negative for diarrhea, nausea and vomiting.   Musculoskeletal: Positive for arthralgias and neck pain. Negative for back pain and myalgias.   Skin: Positive for rash.   Neurological: Positive for headaches. Negative for dizziness and syncope.   Psychiatric/Behavioral: Positive for sleep disturbance. Negative for confusion. The patient is nervous/anxious.        Objective   /84  Pulse 68  Wt 246 lb 9.6 oz (112 kg)  BMI 33.44 kg/m2  Physical Exam   Constitutional: He is oriented to person, place, and time. He appears well-developed and well-nourished.   HENT:   Head: Normocephalic and atraumatic.   Right Ear: External ear normal.   Left Ear: External ear normal.   Mouth/Throat: No oropharyngeal exudate.   Eyes: Conjunctivae are normal. Pupils are equal, round, and reactive to light. No scleral icterus.   Neck: Neck supple. No thyromegaly present.   Cardiovascular: Normal rate, regular rhythm and intact distal pulses.  Exam reveals no friction rub.    No murmur heard.  Pulmonary/Chest: Effort normal and breath sounds normal. He has no wheezes. He has no rales.   Abdominal: Soft. Bowel sounds are normal. He exhibits no distension. There is no tenderness.   Musculoskeletal: He exhibits tenderness.   Neurological: He is alert and oriented to person, place, and  time. No cranial nerve deficit.   Skin: Skin is warm and dry.   Psychiatric: He has a normal mood and affect. Judgment normal.   Nursing note and vitals reviewed.        Results for orders placed or performed in visit on 08/09/17   POC Glycosylated Hemoglobin (Hb A1C)   Result Value Ref Range    Hemoglobin A1C 6.7 %             Assessment/Plan   Diagnoses and all orders for this visit:    Type 2 diabetes mellitus with other specified complication, without long-term current use of insulin  Comments:  commended on improved sugars, and adv. if better, can stop the invokana.  Orders:  -     POC Glycosylated Hemoglobin (Hb A1C)    Diabetic peripheral neuropathy  Comments:  On Lyrica, and gabapentin, will cut back on gabapentin to 600mg tid.  Orders:  -     gabapentin (NEURONTIN) 300 MG capsule; Take 2 capsules by mouth 3 (Three) Times a Day.    Migraine without status migrainosus, not intractable, unspecified migraine type  -     topiramate (TOPAMAX) 100 MG tablet; Take 1 tablet by mouth Every Night.  -     cyclobenzaprine (FLEXERIL) 10 MG tablet; Take 1 tablet by mouth Every Night.    Essential hypertension  -     hydrochlorothiazide (MICROZIDE) 12.5 MG capsule; Take 1 capsule by mouth Every Morning.    Mixed hyperlipidemia  -     atorvastatin (LIPITOR) 20 MG tablet; Take 1 tablet by mouth Every Night.    Balanitis  Comments:  Improved on diflucan and nystatin.    Other orders  -     aspirin 81 MG chewable tablet; Chew 81 mg Daily.             Return in about 3 months (around 11/9/2017) for Recheck.

## 2017-08-21 DIAGNOSIS — J44.1 CHRONIC OBSTRUCTIVE PULMONARY DISEASE WITH ACUTE EXACERBATION (HCC): ICD-10-CM

## 2017-09-03 DIAGNOSIS — B00.9 HSV-2 INFECTION: ICD-10-CM

## 2017-09-03 RX ORDER — ACYCLOVIR 400 MG/1
800 TABLET ORAL 3 TIMES DAILY
Qty: 30 TABLET | Refills: 0 | Status: SHIPPED | OUTPATIENT
Start: 2017-09-03 | End: 2017-10-23 | Stop reason: SDUPTHER

## 2017-09-27 NOTE — TELEPHONE ENCOUNTER
PLEASE CALL THIS IN FOR PT  [facility] [place]  ON [address]

## 2017-09-28 RX ORDER — NITROGLYCERIN 0.4 MG/1
0.4 TABLET SUBLINGUAL
Qty: 30 TABLET | Refills: 0 | Status: SHIPPED | OUTPATIENT
Start: 2017-09-28 | End: 2020-06-29 | Stop reason: SDUPTHER

## 2017-10-23 ENCOUNTER — OFFICE VISIT (OUTPATIENT)
Dept: INTERNAL MEDICINE | Facility: CLINIC | Age: 57
End: 2017-10-23

## 2017-10-23 VITALS
BODY MASS INDEX: 34.02 KG/M2 | SYSTOLIC BLOOD PRESSURE: 122 MMHG | OXYGEN SATURATION: 98 % | HEIGHT: 72 IN | HEART RATE: 90 BPM | WEIGHT: 251.2 LBS | DIASTOLIC BLOOD PRESSURE: 78 MMHG

## 2017-10-23 DIAGNOSIS — E03.9 ACQUIRED HYPOTHYROIDISM: ICD-10-CM

## 2017-10-23 DIAGNOSIS — E11.42 DIABETIC PERIPHERAL NEUROPATHY (HCC): ICD-10-CM

## 2017-10-23 DIAGNOSIS — G89.29 CHRONIC LEFT-SIDED LOW BACK PAIN WITH LEFT-SIDED SCIATICA: ICD-10-CM

## 2017-10-23 DIAGNOSIS — M17.0 PRIMARY OSTEOARTHRITIS OF BOTH KNEES: ICD-10-CM

## 2017-10-23 DIAGNOSIS — M54.42 CHRONIC LEFT-SIDED LOW BACK PAIN WITH LEFT-SIDED SCIATICA: ICD-10-CM

## 2017-10-23 DIAGNOSIS — B00.9 HSV-2 INFECTION: ICD-10-CM

## 2017-10-23 DIAGNOSIS — M54.2 CHRONIC NECK PAIN: ICD-10-CM

## 2017-10-23 DIAGNOSIS — G89.29 CHRONIC NECK PAIN: ICD-10-CM

## 2017-10-23 DIAGNOSIS — E11.69 TYPE 2 DIABETES MELLITUS WITH OTHER SPECIFIED COMPLICATION, WITHOUT LONG-TERM CURRENT USE OF INSULIN (HCC): Primary | ICD-10-CM

## 2017-10-23 DIAGNOSIS — G89.4 CHRONIC PAIN SYNDROME: ICD-10-CM

## 2017-10-23 LAB — HBA1C MFR BLD: 7.9 %

## 2017-10-23 PROCEDURE — 99214 OFFICE O/P EST MOD 30 MIN: CPT | Performed by: INTERNAL MEDICINE

## 2017-10-23 PROCEDURE — 83036 HEMOGLOBIN GLYCOSYLATED A1C: CPT | Performed by: INTERNAL MEDICINE

## 2017-10-23 RX ORDER — PREGABALIN 100 MG/1
100 CAPSULE ORAL 3 TIMES DAILY
Qty: 90 CAPSULE | Refills: 2 | Status: SHIPPED | OUTPATIENT
Start: 2017-10-23 | End: 2018-01-19

## 2017-10-23 RX ORDER — PREGABALIN 100 MG/1
100 CAPSULE ORAL 2 TIMES DAILY
Qty: 90 CAPSULE | Refills: 2 | Status: SHIPPED | OUTPATIENT
Start: 2017-10-23 | End: 2017-10-23 | Stop reason: SDUPTHER

## 2017-10-23 RX ORDER — ACYCLOVIR 400 MG/1
800 TABLET ORAL 2 TIMES DAILY
Qty: 60 TABLET | Refills: 5 | Status: SHIPPED | OUTPATIENT
Start: 2017-10-23 | End: 2017-11-22 | Stop reason: SDUPTHER

## 2017-10-23 RX ORDER — METOPROLOL SUCCINATE 50 MG/1
50 TABLET, EXTENDED RELEASE ORAL DAILY
Qty: 90 TABLET | Refills: 1 | Status: SHIPPED | OUTPATIENT
Start: 2017-10-23 | End: 2018-04-24 | Stop reason: SDUPTHER

## 2017-10-23 NOTE — PROGRESS NOTES
Pain (Requesting referral for pain management to Dr.Oliver Elena. C/o constant bilateral knee pain)    Subjective   Carmelo Saucedo is a 57 y.o. male is here today for follow-up.    History of Present Illness   Having chronic bilateral knee pain, getting worse, Orthopedics has not been able to help.  Was told that he does not have muscle to hold them together.  Pain is also worse in the neck and the back.tried chiropractor.    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    Current Outpatient Prescriptions:   •  acyclovir (ZOVIRAX) 400 MG tablet, Take 2 tablets by mouth 2 (Two) Times a Day., Disp: 60 tablet, Rfl: 5  •  aspirin 81 MG chewable tablet, Chew 81 mg Daily., Disp: , Rfl:   •  atorvastatin (LIPITOR) 20 MG tablet, Take 1 tablet by mouth Every Night., Disp: 90 tablet, Rfl: 1  •  budesonide-formoterol (SYMBICORT) 160-4.5 MCG/ACT inhaler, Inhale 2 puffs 2 (Two) Times a Day. Rinse mouth after use., Disp: 3 inhaler, Rfl: 1  •  Canagliflozin (INVOKANA) 300 MG tablet, Take 300 mg by mouth Daily., Disp: 90 tablet, Rfl: 1  •  Cholecalciferol (VITAMIN D3) 5000 UNITS capsule capsule, Take 1 capsule by mouth daily., Disp: 30 capsule, Rfl: 5  •  clonazePAM (KlonoPIN) 1 MG tablet, Take 1 tablet by mouth 2 (Two) Times a Day., Disp: , Rfl: 0  •  cyclobenzaprine (FLEXERIL) 10 MG tablet, Take 1 tablet by mouth Every Night., Disp: 90 tablet, Rfl: 1  •  gabapentin (NEURONTIN) 300 MG capsule, Take 2 capsules by mouth 3 (Three) Times a Day., Disp: 180 capsule, Rfl: 4  •  glucose blood (NADEEM CONTOUR NEXT TEST) test strip, 1 strip daily., Disp: , Rfl:   •  hydrochlorothiazide (MICROZIDE) 12.5 MG capsule, Take 1 capsule by mouth Every Morning., Disp: 90 capsule, Rfl: 1  •  hydrocortisone (ANUSOL-HC) 2.5 % rectal cream, Insert  into the rectum 2 (Two) Times a Day., Disp: 30 g, Rfl: 3  •  ipratropium-albuterol (COMBIVENT RESPIMAT)  MCG/ACT inhaler, Inhale 1 puff 4 (Four) Times a Day., Disp: 12 g, Rfl: 1  •  ipratropium-albuterol  (DUONEB) 0.5-2.5 mg/mL nebulizer, Take 3 mL by nebulization Every 6 (Six) Hours., Disp: 120 vial, Rfl: 3  •  Lancets (FREESTYLE) lancets, FOR ONCE DAILY TESTING, Disp: 100 each, Rfl: 3  •  levothyroxine (SYNTHROID, LEVOTHROID) 88 MCG tablet, Take 1 tablet by mouth Daily. Take 1 tablet daily on empty stomach preferably take 1/2 tablet 1 hour before breakfast., Disp: 90 tablet, Rfl: 1  •  lisinopril (PRINIVIL,ZESTRIL) 5 MG tablet, take 1 tablet by mouth once daily, Disp: 90 tablet, Rfl: 1  •  lithium 600 MG capsule, Take 1 capsule by mouth 3 (Three) Times a Day., Disp: , Rfl: 0  •  meloxicam (MOBIC) 15 MG tablet, take 1 tablet by mouth once daily with food, Disp: 90 tablet, Rfl: 1  •  metFORMIN (GLUCOPHAGE) 1000 MG tablet, Take 1.5 tablets in the morning and 1 tablet at night with food., Disp: 75 tablet, Rfl: 5  •  metoprolol succinate XL (TOPROL-XL) 50 MG 24 hr tablet, Take 1 tablet by mouth Daily., Disp: 90 tablet, Rfl: 1  •  nitroglycerin (NITROSTAT) 0.4 MG SL tablet, Place 1 tablet under the tongue Every 5 (Five) Minutes As Needed for Chest Pain. Only take up to 3 tablets. Call 911 if pain persists., Disp: 30 tablet, Rfl: 0  •  nystatin (MYCOSTATIN) 500499 UNIT/GM cream, Apply  topically 2 (Two) Times a Day., Disp: 15 g, Rfl: 0  •  pantoprazole (PROTONIX) 40 MG EC tablet, take 1 tablet by mouth once daily, Disp: 90 tablet, Rfl: 1  •  pregabalin (LYRICA) 100 MG capsule, Take 1 capsule by mouth 3 (Three) Times a Day., Disp: 90 capsule, Rfl: 2  •  QUEtiapine (SEROquel) 400 MG tablet, Take 2 tablets by mouth every night., Disp: , Rfl:   •  tamsulosin (FLOMAX) 0.4 MG capsule 24 hr capsule, take 2 capsules by mouth at bedtime, Disp: 180 capsule, Rfl: 1  •  topiramate (TOPAMAX) 100 MG tablet, Take 1 tablet by mouth Every Night., Disp: 90 tablet, Rfl: 1  •  traZODone (DESYREL) 50 MG tablet, Take 1 tablet by mouth every night., Disp: , Rfl:   •  venlafaxine XR (EFFEXOR-XR) 150 MG 24 hr capsule, Take 1 capsule by mouth  "daily., Disp: , Rfl:   •  venlafaxine XR (EFFEXOR-XR) 75 MG 24 hr capsule, , Disp: , Rfl:   •  vitamin D (ERGOCALCIFEROL) 50312 UNITS capsule capsule, Take 1 capsule by mouth 1 (One) Time Per Week., Disp: 4 capsule, Rfl: 3      The following portions of the patient's history were reviewed and updated as appropriate: allergies, current medications, past family history, past medical history, past social history, past surgical history and problem list.    Review of Systems   Constitutional: Positive for fatigue. Negative for chills and fever.   HENT: Negative for ear discharge, ear pain, sinus pressure and sore throat.    Respiratory: Negative for cough, chest tightness and shortness of breath.    Cardiovascular: Negative for chest pain, palpitations and leg swelling.   Gastrointestinal: Negative for diarrhea, nausea and vomiting.   Musculoskeletal: Positive for arthralgias, back pain, gait problem, joint swelling, myalgias, neck pain and neck stiffness.   Skin: Positive for rash.   Neurological: Positive for weakness. Negative for dizziness, syncope and headaches.   Psychiatric/Behavioral: Negative for confusion and sleep disturbance.       Objective   /78  Pulse 90  Ht 72\" (182.9 cm)  Wt 251 lb 3.2 oz (114 kg)  SpO2 98%  BMI 34.07 kg/m2  Physical Exam   Constitutional: He is oriented to person, place, and time. He appears well-developed and well-nourished.   HENT:   Head: Normocephalic and atraumatic.   Right Ear: External ear normal.   Left Ear: External ear normal.   Mouth/Throat: No oropharyngeal exudate.   Eyes: Conjunctivae are normal. Pupils are equal, round, and reactive to light. No scleral icterus.   Neck: Neck supple. No thyromegaly present.   Cardiovascular: Normal rate, regular rhythm and intact distal pulses.  Exam reveals no friction rub.    No murmur heard.  Pulmonary/Chest: Effort normal and breath sounds normal. He has no wheezes. He has no rales.   Abdominal: Soft. Bowel sounds are " normal. He exhibits no distension. There is no tenderness.   Musculoskeletal: He exhibits tenderness.   Antalgic gait   Neurological: He is alert and oriented to person, place, and time. No cranial nerve deficit.   Skin: Skin is warm and dry.   Psychiatric: He has a normal mood and affect. Judgment normal.   Nursing note and vitals reviewed.        Results for orders placed or performed in visit on 10/23/17   POC Glycosylated Hemoglobin (Hb A1C)   Result Value Ref Range    Hemoglobin A1C 7.9 %             Assessment/Plan   Diagnoses and all orders for this visit:    Type 2 diabetes mellitus with other specified complication, without long-term current use of insulin  -     POC Glycosylated Hemoglobin (Hb A1C)    Chronic neck pain  Comments:  , refer to PTC.  Orders:  -     Ambulatory Referral to Pain Management  -     MRI Cervical Spine Without Contrast    Chronic left-sided low back pain with left-sided sciatica  Comments:  Chriopractor made it worse.    Primary osteoarthritis of both knees  -     Ambulatory Referral to Pain Management    Diabetic peripheral neuropathy  Comments:  On Lyrica, and gabapentin, will cut back on gabapentin to 600mg bid x 1 mo and then to HS x 1 mo, then stop.  Orders:  -     Discontinue: pregabalin (LYRICA) 100 MG capsule; Take 1 capsule by mouth 2 (Two) Times a Day.  -     pregabalin (LYRICA) 100 MG capsule; Take 1 capsule by mouth 3 (Three) Times a Day.    Chronic pain syndrome  -     Ambulatory Referral to Pain Management    Acquired hypothyroidism    HSV-2 infection  -     acyclovir (ZOVIRAX) 400 MG tablet; Take 2 tablets by mouth 2 (Two) Times a Day.    Diabetic peripheral neuropathy  -     Discontinue: pregabalin (LYRICA) 100 MG capsule; Take 1 capsule by mouth 2 (Two) Times a Day.  -     pregabalin (LYRICA) 100 MG capsule; Take 1 capsule by mouth 3 (Three) Times a Day.    Other orders  -     metoprolol succinate XL (TOPROL-XL) 50 MG 24 hr tablet; Take 1 tablet by mouth Daily.  -      Canagliflozin (INVOKANA) 300 MG tablet; Take 300 mg by mouth Daily.  -     metFORMIN (GLUCOPHAGE) 1000 MG tablet; Take 1.5 tablets in the morning and 1 tablet at night with food.    Previously seen by Pain clinic at [facility], and has been wo pain management, and would like to be referred to Dr. Stefan Elena.             Return in about 3 months (around 1/23/2018).

## 2017-10-24 ENCOUNTER — HOSPITAL ENCOUNTER (OUTPATIENT)
Dept: MRI IMAGING | Facility: HOSPITAL | Age: 57
Discharge: HOME | End: 2017-10-24
Attending: INTERNAL MEDICINE | Admitting: INTERNAL MEDICINE

## 2017-10-24 PROCEDURE — 72141 MRI NECK SPINE W/O DYE: CPT

## 2017-11-22 ENCOUNTER — TELEPHONE (OUTPATIENT)
Dept: INTERNAL MEDICINE | Facility: CLINIC | Age: 57
End: 2017-11-22

## 2017-11-22 DIAGNOSIS — B00.9 HSV-2 INFECTION: ICD-10-CM

## 2017-11-22 RX ORDER — ACYCLOVIR 400 MG/1
400 TABLET ORAL 2 TIMES DAILY
Qty: 60 TABLET | Refills: 5 | Status: SHIPPED | OUTPATIENT
Start: 2017-11-22 | End: 2018-06-22 | Stop reason: SDUPTHER

## 2017-11-22 NOTE — TELEPHONE ENCOUNTER
ZOVIRAX- PT WOULD LIKE TO KNOW IF HE'S SUPPOSE TO TAKE 1 PILL 2 TIMESA DAY OR 2 PILL TWO TIMES A DAY. THE WAY THE PRESCRIPTION IS WRITTEN ONLY GIVES PT A 15 DAY SUPPLY. PT IS NOT POSITIVE IF YOU TOLD HIM TO TAKE 1 400MG A DAY BC THE BOTTLE SAYS TAKE 2 TWICE A DAY. PT JUST NOTICED WHAT IT SAID ON HIS BOTTLE WHEN HE WENT TO CALL IN HIS REFILL. PT HAS BEEN TAKING 1 PILL TWICE A DAY AND IS FINE WITH THAT.  BUT WANTS TO MAKE SURE WHAT DOSE HE'S SUPPOSE TO TAKE.

## 2017-12-11 ENCOUNTER — TELEPHONE (OUTPATIENT)
Dept: INTERNAL MEDICINE | Facility: CLINIC | Age: 57
End: 2017-12-11

## 2017-12-11 NOTE — TELEPHONE ENCOUNTER
PT'S GRAND KIDS WERE SENT HOME WITH LICE, THEY WOULD LIKE TO KNOW IF YOU CAN CALL IN A PRESCRIPTION FOR HIS WIFE AND HIMSELF.     MER WHITE 06/27/1961

## 2017-12-26 RX ORDER — LISINOPRIL 5 MG/1
TABLET ORAL
Qty: 90 TABLET | Refills: 1 | Status: SHIPPED | OUTPATIENT
Start: 2017-12-26 | End: 2018-06-18 | Stop reason: SDUPTHER

## 2017-12-26 RX ORDER — PANTOPRAZOLE SODIUM 40 MG/1
TABLET, DELAYED RELEASE ORAL
Qty: 90 TABLET | Refills: 1 | Status: SHIPPED | OUTPATIENT
Start: 2017-12-26 | End: 2018-06-18 | Stop reason: SDUPTHER

## 2018-01-19 ENCOUNTER — OFFICE VISIT (OUTPATIENT)
Dept: INTERNAL MEDICINE | Facility: CLINIC | Age: 58
End: 2018-01-19

## 2018-01-19 VITALS
HEIGHT: 73 IN | WEIGHT: 250.4 LBS | BODY MASS INDEX: 33.19 KG/M2 | DIASTOLIC BLOOD PRESSURE: 84 MMHG | RESPIRATION RATE: 12 BRPM | OXYGEN SATURATION: 97 % | SYSTOLIC BLOOD PRESSURE: 136 MMHG | HEART RATE: 94 BPM

## 2018-01-19 DIAGNOSIS — E11.42 DIABETIC PERIPHERAL NEUROPATHY (HCC): ICD-10-CM

## 2018-01-19 DIAGNOSIS — E11.69 TYPE 2 DIABETES MELLITUS WITH OTHER SPECIFIED COMPLICATION, WITHOUT LONG-TERM CURRENT USE OF INSULIN (HCC): Primary | ICD-10-CM

## 2018-01-19 DIAGNOSIS — G43.909 MIGRAINE WITHOUT STATUS MIGRAINOSUS, NOT INTRACTABLE, UNSPECIFIED MIGRAINE TYPE: ICD-10-CM

## 2018-01-19 DIAGNOSIS — E78.2 MIXED HYPERLIPIDEMIA: ICD-10-CM

## 2018-01-19 DIAGNOSIS — E03.9 ACQUIRED HYPOTHYROIDISM: ICD-10-CM

## 2018-01-19 LAB
GLUCOSE BLDC GLUCOMTR-MCNC: 133 MG/DL (ref 70–130)
HBA1C MFR BLD: 7.9 %

## 2018-01-19 PROCEDURE — 83036 HEMOGLOBIN GLYCOSYLATED A1C: CPT | Performed by: INTERNAL MEDICINE

## 2018-01-19 PROCEDURE — 99214 OFFICE O/P EST MOD 30 MIN: CPT | Performed by: INTERNAL MEDICINE

## 2018-01-19 PROCEDURE — 82947 ASSAY GLUCOSE BLOOD QUANT: CPT | Performed by: INTERNAL MEDICINE

## 2018-01-19 RX ORDER — GABAPENTIN 300 MG/1
CAPSULE ORAL
Qty: 210 CAPSULE | Refills: 2 | Status: SHIPPED | OUTPATIENT
Start: 2018-01-19 | End: 2018-01-19 | Stop reason: SDUPTHER

## 2018-01-19 RX ORDER — TOPIRAMATE 100 MG/1
100 TABLET, FILM COATED ORAL NIGHTLY
Qty: 90 TABLET | Refills: 1 | Status: SHIPPED | OUTPATIENT
Start: 2018-01-19 | End: 2019-10-23

## 2018-01-19 RX ORDER — PIOGLITAZONEHYDROCHLORIDE 15 MG/1
15 TABLET ORAL DAILY
Qty: 30 TABLET | Refills: 5 | Status: SHIPPED | OUTPATIENT
Start: 2018-01-19 | End: 2018-06-26 | Stop reason: SDUPTHER

## 2018-01-19 RX ORDER — DULOXETIN HYDROCHLORIDE 60 MG/1
60 CAPSULE, DELAYED RELEASE ORAL DAILY
COMMUNITY
End: 2020-05-03

## 2018-01-19 RX ORDER — ATORVASTATIN CALCIUM 20 MG/1
20 TABLET, FILM COATED ORAL NIGHTLY
Qty: 90 TABLET | Refills: 1 | Status: SHIPPED | OUTPATIENT
Start: 2018-01-19 | End: 2018-08-01 | Stop reason: SDUPTHER

## 2018-01-19 RX ORDER — GABAPENTIN 300 MG/1
CAPSULE ORAL
Qty: 210 CAPSULE | Refills: 3
Start: 2018-01-19 | End: 2018-05-21 | Stop reason: SDUPTHER

## 2018-01-19 RX ORDER — LEVOTHYROXINE SODIUM 88 UG/1
88 TABLET ORAL DAILY
Qty: 90 TABLET | Refills: 1 | Status: SHIPPED | OUTPATIENT
Start: 2018-01-19 | End: 2018-06-26 | Stop reason: SDUPTHER

## 2018-01-19 NOTE — PROGRESS NOTES
Follow-up (Type 2 diabetes and medication refills. )    Denilson Saucedo is a 57 y.o. male is here today for follow-up.    History of Present Illness   Here for f/u on DM2, hypothyroid and hlp, .Unable to take the Invokana due to multiple yeast infections.  Has tried to come off the lyrica, and was unable to come off the gabapentin.  Seeing Bayron Lofton at [facility] as depression on better.  Now on cymbalta.  Needs labs but not fasting.      Current Outpatient Prescriptions:   •  acyclovir (ZOVIRAX) 400 MG tablet, Take 1 tablet by mouth 2 (Two) Times a Day. Please note dose change, Disp: 60 tablet, Rfl: 5  •  aspirin 81 MG chewable tablet, Chew 81 mg Daily., Disp: , Rfl:   •  atorvastatin (LIPITOR) 20 MG tablet, Take 1 tablet by mouth Every Night., Disp: 90 tablet, Rfl: 1  •  Cholecalciferol (VITAMIN D3) 5000 UNITS capsule capsule, Take 1 capsule by mouth daily., Disp: 30 capsule, Rfl: 5  •  clonazePAM (KlonoPIN) 1 MG tablet, Take 1 tablet by mouth 2 (Two) Times a Day., Disp: , Rfl: 0  •  cyclobenzaprine (FLEXERIL) 10 MG tablet, Take 1 tablet by mouth Every Night., Disp: 90 tablet, Rfl: 1  •  DULoxetine (CYMBALTA) 60 MG capsule, Take 60 mg by mouth Daily. Take 60 mg until 01-24-18 then increase to 90 mg daily, Disp: , Rfl:   •  gabapentin (NEURONTIN) 300 MG capsule, Take 2 AM, 2  PM and 3 HS, Disp: 210 capsule, Rfl: 3  •  glucose blood (NADEEM CONTOUR NEXT TEST) test strip, 1 strip daily., Disp: , Rfl:   •  hydrochlorothiazide (MICROZIDE) 12.5 MG capsule, Take 1 capsule by mouth Every Morning., Disp: 90 capsule, Rfl: 1  •  hydrocortisone (ANUSOL-HC) 2.5 % rectal cream, Insert  into the rectum 2 (Two) Times a Day., Disp: 30 g, Rfl: 3  •  ipratropium-albuterol (COMBIVENT RESPIMAT)  MCG/ACT inhaler, Inhale 1 puff 4 (Four) Times a Day., Disp: 12 g, Rfl: 1  •  ipratropium-albuterol (DUONEB) 0.5-2.5 mg/mL nebulizer, Take 3 mL by nebulization Every 6 (Six) Hours., Disp: 120 vial, Rfl: 3  •  Lancets (FREESTYLE)  lancets, FOR ONCE DAILY TESTING, Disp: 100 each, Rfl: 3  •  levothyroxine (SYNTHROID, LEVOTHROID) 88 MCG tablet, Take 1 tablet by mouth Daily. Take 1 tablet daily on empty stomach preferably take 1/2 tablet 1 hour before breakfast., Disp: 90 tablet, Rfl: 1  •  lisinopril (PRINIVIL,ZESTRIL) 5 MG tablet, take 1 tablet by mouth once daily, Disp: 90 tablet, Rfl: 1  •  lithium 600 MG capsule, Take 1 capsule by mouth 3 (Three) Times a Day., Disp: , Rfl: 0  •  meloxicam (MOBIC) 15 MG tablet, take 1 tablet by mouth once daily with food, Disp: 90 tablet, Rfl: 1  •  metFORMIN (GLUCOPHAGE) 1000 MG tablet, Take 1.5 tablets in the morning and 1 tablet at night with food., Disp: 75 tablet, Rfl: 5  •  metoprolol succinate XL (TOPROL-XL) 50 MG 24 hr tablet, Take 1 tablet by mouth Daily., Disp: 90 tablet, Rfl: 1  •  nitroglycerin (NITROSTAT) 0.4 MG SL tablet, Place 1 tablet under the tongue Every 5 (Five) Minutes As Needed for Chest Pain. Only take up to 3 tablets. Call 911 if pain persists., Disp: 30 tablet, Rfl: 0  •  nystatin (MYCOSTATIN) 827899 UNIT/GM cream, Apply  topically 2 (Two) Times a Day., Disp: 15 g, Rfl: 0  •  pantoprazole (PROTONIX) 40 MG EC tablet, take 1 tablet by mouth once daily, Disp: 90 tablet, Rfl: 1  •  QUEtiapine (SEROquel) 400 MG tablet, Take 2 tablets by mouth every night., Disp: , Rfl:   •  tamsulosin (FLOMAX) 0.4 MG capsule 24 hr capsule, take 2 capsules by mouth at bedtime, Disp: 180 capsule, Rfl: 1  •  topiramate (TOPAMAX) 100 MG tablet, Take 1 tablet by mouth Every Night., Disp: 90 tablet, Rfl: 1  •  traZODone (DESYREL) 50 MG tablet, Take 1 tablet by mouth every night., Disp: , Rfl:   •  venlafaxine XR (EFFEXOR-XR) 150 MG 24 hr capsule, Take 1 capsule by mouth Daily. Weaning off this drug. On 01-24-18 drop dosage down to 25 mg daily. Due to being on Cymbalta., Disp: , Rfl:   •  vitamin D (ERGOCALCIFEROL) 76580 UNITS capsule capsule, Take 1 capsule by mouth 1 (One) Time Per Week., Disp: 4 capsule, Rfl:  "3  •  pioglitazone (ACTOS) 15 MG tablet, Take 1 tablet by mouth Daily., Disp: 30 tablet, Rfl: 5      The following portions of the patient's history were reviewed and updated as appropriate: allergies, current medications, past family history, past medical history, past social history, past surgical history and problem list.    Review of Systems   Constitutional: Positive for fatigue. Negative for chills and fever.   HENT: Negative for ear discharge, ear pain, sinus pressure and sore throat.    Respiratory: Negative for cough, chest tightness and shortness of breath.    Cardiovascular: Negative for chest pain, palpitations and leg swelling.   Gastrointestinal: Negative for diarrhea, nausea and vomiting.   Musculoskeletal: Positive for arthralgias, back pain and myalgias.   Neurological: Positive for numbness. Negative for dizziness, syncope and headaches.   Psychiatric/Behavioral: Negative for confusion and sleep disturbance.       Objective   /84  Pulse 94  Resp 12  Ht 185.4 cm (73\")  Wt 114 kg (250 lb 6.4 oz)  SpO2 97%  BMI 33.04 kg/m2  Physical Exam   Constitutional: He is oriented to person, place, and time. He appears well-developed and well-nourished.   HENT:   Head: Normocephalic and atraumatic.   Right Ear: External ear normal.   Left Ear: External ear normal.   Mouth/Throat: No oropharyngeal exudate.   Eyes: Conjunctivae are normal. Pupils are equal, round, and reactive to light. No scleral icterus.   Neck: Neck supple. No thyromegaly present.   Cardiovascular: Normal rate, regular rhythm and intact distal pulses.  Exam reveals no friction rub.    No murmur heard.  Pulmonary/Chest: Effort normal and breath sounds normal. He has no wheezes. He has no rales.   Abdominal: Soft. Bowel sounds are normal. He exhibits no distension. There is no tenderness.   Musculoskeletal: He exhibits tenderness.   Antalgic gait   Neurological: He is alert and oriented to person, place, and time. No cranial nerve " deficit.   Skin: Skin is warm and dry.   Psychiatric: He has a normal mood and affect. Judgment normal.   Nursing note and vitals reviewed.        Results for orders placed or performed in visit on 01/19/18   POC Glycosylated Hemoglobin (Hb A1C)   Result Value Ref Range    Hemoglobin A1C 7.9 %   POCT Glucose   Result Value Ref Range    Glucose 133 (A) 70 - 130 mg/dL             Assessment/Plan   Diagnoses and all orders for this visit:    Type 2 diabetes mellitus with other specified complication, without long-term current use of insulin  -     POC Glycosylated Hemoglobin (Hb A1C)  -     POCT Glucose  -     pioglitazone (ACTOS) 15 MG tablet; Take 1 tablet by mouth Daily.    Acquired hypothyroidism  -     levothyroxine (SYNTHROID, LEVOTHROID) 88 MCG tablet; Take 1 tablet by mouth Daily. Take 1 tablet daily on empty stomach preferably take 1/2 tablet 1 hour before breakfast.    Mixed hyperlipidemia  -     atorvastatin (LIPITOR) 20 MG tablet; Take 1 tablet by mouth Every Night.    Migraine without status migrainosus, not intractable, unspecified migraine type  -     topiramate (TOPAMAX) 100 MG tablet; Take 1 tablet by mouth Every Night.    Diabetic peripheral neuropathy  Comments:  He is able to stop the lyrica  and will continue gabapentin on a slightly higher dose.    Orders:  -     Discontinue: gabapentin (NEURONTIN) 300 MG capsule; Take 2 AM, 2  PM and 3 HS  -     gabapentin (NEURONTIN) 300 MG capsule; Take 2 AM, 2  PM and 3 HS    Other orders  -     DULoxetine (CYMBALTA) 60 MG capsule; Take 60 mg by mouth Daily. Take 60 mg until 01-24-18 then increase to 90 mg daily             The patient has read and signed the [facility] Controlled Substance Contract.  I will continue to see patient for regular follow up appointments.  They are well controlled on their medication.  SOLITARIO has been reviewed by me and is updated every 3 months. The patient is aware of the potential for addiction and dependence.      Return in  about 4 months (around 5/19/2018) for Medicare Wellness after 5/2/18.labs and uds with physical next visit.

## 2018-02-05 RX ORDER — TAMSULOSIN HYDROCHLORIDE 0.4 MG/1
CAPSULE ORAL
Qty: 180 CAPSULE | Refills: 1 | Status: SHIPPED | OUTPATIENT
Start: 2018-02-05 | End: 2018-08-21 | Stop reason: SDUPTHER

## 2018-02-06 ENCOUNTER — TRANSCRIBE ORDERS (OUTPATIENT)
Dept: ADMINISTRATIVE | Facility: HOSPITAL | Age: 58
End: 2018-02-06

## 2018-02-06 DIAGNOSIS — M51.36 DDD (DEGENERATIVE DISC DISEASE), LUMBAR: Primary | ICD-10-CM

## 2018-02-14 ENCOUNTER — HOSPITAL ENCOUNTER (OUTPATIENT)
Dept: MRI IMAGING | Facility: HOSPITAL | Age: 58
Discharge: HOME | End: 2018-02-14
Admitting: ANESTHESIOLOGY

## 2018-02-14 DIAGNOSIS — J44.1 CHRONIC OBSTRUCTIVE PULMONARY DISEASE WITH ACUTE EXACERBATION (HCC): ICD-10-CM

## 2018-02-14 DIAGNOSIS — M51.36 DDD (DEGENERATIVE DISC DISEASE), LUMBAR: ICD-10-CM

## 2018-02-14 PROCEDURE — 72148 MRI LUMBAR SPINE W/O DYE: CPT

## 2018-02-14 RX ORDER — IPRATROPIUM/ALBUTEROL SULFATE 20-100 MCG
MIST INHALER (GRAM) INHALATION
Qty: 12 G | Refills: 1 | Status: SHIPPED | OUTPATIENT
Start: 2018-02-14 | End: 2018-06-26 | Stop reason: SDUPTHER

## 2018-02-16 DIAGNOSIS — I10 ESSENTIAL HYPERTENSION: ICD-10-CM

## 2018-02-16 RX ORDER — HYDROCHLOROTHIAZIDE 12.5 MG/1
CAPSULE, GELATIN COATED ORAL
Qty: 90 CAPSULE | Refills: 1 | Status: SHIPPED | OUTPATIENT
Start: 2018-02-16 | End: 2018-08-09 | Stop reason: SDUPTHER

## 2018-03-02 DIAGNOSIS — G43.909 MIGRAINE WITHOUT STATUS MIGRAINOSUS, NOT INTRACTABLE, UNSPECIFIED MIGRAINE TYPE: ICD-10-CM

## 2018-03-02 RX ORDER — CYCLOBENZAPRINE HCL 10 MG
TABLET ORAL
Qty: 90 TABLET | Refills: 1 | Status: SHIPPED | OUTPATIENT
Start: 2018-03-02 | End: 2020-02-11 | Stop reason: SINTOL

## 2018-04-24 RX ORDER — METOPROLOL SUCCINATE 50 MG/1
TABLET, EXTENDED RELEASE ORAL
Qty: 90 TABLET | Refills: 1 | Status: SHIPPED | OUTPATIENT
Start: 2018-04-24 | End: 2018-10-23 | Stop reason: SDUPTHER

## 2018-05-21 DIAGNOSIS — E11.42 DIABETIC PERIPHERAL NEUROPATHY (HCC): ICD-10-CM

## 2018-05-21 NOTE — TELEPHONE ENCOUNTER
PATIENT WOULD LIKE TO KNOW IF HE COULD GET A  REFILL ON THIS MEDICATION  UNTIL HIS SCHEDULED APPOINTMENT HE HAS WITH DR MINOR ON June 26 2018? THE PATIENT WOULD LIKE TO GET A CALL BACK IN REGARDS TO THIS MATTER [phone]

## 2018-05-22 RX ORDER — GABAPENTIN 300 MG/1
CAPSULE ORAL
Qty: 210 CAPSULE | Refills: 0 | Status: SHIPPED | OUTPATIENT
Start: 2018-05-22 | End: 2018-06-26 | Stop reason: SDUPTHER

## 2018-05-24 DIAGNOSIS — E11.42 DIABETIC PERIPHERAL NEUROPATHY (HCC): ICD-10-CM

## 2018-05-24 NOTE — TELEPHONE ENCOUNTER
PATIENT IS CALLING NEEDING HIS SCRIPT FOR GABAPENTIN. HE TAKES HIS LAST ONE TONIGHT AND IS WORRIED ABOUT NOT HAVING REFILLS AND THE EFFECTS IT COULD CAUSE NOT TAKING MEDICATION TOMORROW. PATIENT NEEDS A CALL BACK TODAY ABOUT THIS PRESCRIPTION. YOU CAN REACH PATIENT BACK [phone]

## 2018-05-24 NOTE — TELEPHONE ENCOUNTER
MARIBELL from [facility] [phone]      Pt is running low on gabapentin 300 mg 2 cap by mouth every morning, 2cap in evening; 3 caps  at bedtime

## 2018-05-25 RX ORDER — GABAPENTIN 300 MG/1
CAPSULE ORAL
Qty: 210 CAPSULE | Refills: 3 | OUTPATIENT
Start: 2018-05-25

## 2018-06-05 RX ORDER — MELOXICAM 15 MG/1
TABLET ORAL
Qty: 90 TABLET | Refills: 0 | Status: SHIPPED | OUTPATIENT
Start: 2018-06-05 | End: 2020-04-22

## 2018-06-18 RX ORDER — PANTOPRAZOLE SODIUM 40 MG/1
TABLET, DELAYED RELEASE ORAL
Qty: 90 TABLET | Refills: 0 | Status: SHIPPED | OUTPATIENT
Start: 2018-06-18 | End: 2018-09-19 | Stop reason: SDUPTHER

## 2018-06-18 RX ORDER — LISINOPRIL 5 MG/1
TABLET ORAL
Qty: 90 TABLET | Refills: 0 | Status: SHIPPED | OUTPATIENT
Start: 2018-06-18 | End: 2018-09-19 | Stop reason: SDUPTHER

## 2018-06-22 DIAGNOSIS — B00.9 HSV-2 INFECTION: ICD-10-CM

## 2018-06-22 RX ORDER — ACYCLOVIR 400 MG/1
TABLET ORAL
Qty: 60 TABLET | Refills: 5 | Status: SHIPPED | OUTPATIENT
Start: 2018-06-22 | End: 2019-02-25 | Stop reason: SDUPTHER

## 2018-06-26 ENCOUNTER — OFFICE VISIT (OUTPATIENT)
Dept: INTERNAL MEDICINE | Facility: CLINIC | Age: 58
End: 2018-06-26

## 2018-06-26 VITALS
HEART RATE: 74 BPM | BODY MASS INDEX: 33.62 KG/M2 | DIASTOLIC BLOOD PRESSURE: 74 MMHG | SYSTOLIC BLOOD PRESSURE: 108 MMHG | WEIGHT: 254.8 LBS

## 2018-06-26 DIAGNOSIS — N41.1 CHRONIC PROSTATITIS: ICD-10-CM

## 2018-06-26 DIAGNOSIS — J44.1 CHRONIC OBSTRUCTIVE PULMONARY DISEASE WITH ACUTE EXACERBATION (HCC): ICD-10-CM

## 2018-06-26 DIAGNOSIS — E03.9 ACQUIRED HYPOTHYROIDISM: ICD-10-CM

## 2018-06-26 DIAGNOSIS — E55.9 VITAMIN D DEFICIENCY: ICD-10-CM

## 2018-06-26 DIAGNOSIS — E11.42 DIABETIC PERIPHERAL NEUROPATHY (HCC): ICD-10-CM

## 2018-06-26 DIAGNOSIS — N40.0 ENLARGED PROSTATE: ICD-10-CM

## 2018-06-26 DIAGNOSIS — E11.69 TYPE 2 DIABETES MELLITUS WITH OTHER SPECIFIED COMPLICATION, WITHOUT LONG-TERM CURRENT USE OF INSULIN (HCC): Primary | ICD-10-CM

## 2018-06-26 DIAGNOSIS — E78.2 MIXED HYPERLIPIDEMIA: ICD-10-CM

## 2018-06-26 DIAGNOSIS — I10 ESSENTIAL HYPERTENSION: ICD-10-CM

## 2018-06-26 DIAGNOSIS — Z00.00 MEDICARE ANNUAL WELLNESS VISIT, SUBSEQUENT: ICD-10-CM

## 2018-06-26 LAB
25(OH)D3 SERPL-MCNC: 16.8 NG/ML
ALBUMIN SERPL-MCNC: 4.39 G/DL (ref 3.2–4.8)
ALBUMIN/GLOB SERPL: 1.8 G/DL (ref 1.5–2.5)
ALP SERPL-CCNC: 80 U/L (ref 25–100)
ALT SERPL W P-5'-P-CCNC: 17 U/L (ref 7–40)
ANION GAP SERPL CALCULATED.3IONS-SCNC: 7 MMOL/L (ref 3–11)
ARTICHOKE IGE QN: 63 MG/DL (ref 0–130)
AST SERPL-CCNC: 17 U/L (ref 0–33)
BILIRUB SERPL-MCNC: 0.3 MG/DL (ref 0.3–1.2)
BUN BLD-MCNC: 15 MG/DL (ref 9–23)
BUN/CREAT SERPL: 11.1 (ref 7–25)
CALCIUM SPEC-SCNC: 9.1 MG/DL (ref 8.7–10.4)
CHLORIDE SERPL-SCNC: 105 MMOL/L (ref 99–109)
CHOLEST SERPL-MCNC: 134 MG/DL (ref 0–200)
CO2 SERPL-SCNC: 28 MMOL/L (ref 20–31)
CREAT BLD-MCNC: 1.35 MG/DL (ref 0.6–1.3)
DEPRECATED RDW RBC AUTO: 50.4 FL (ref 37–54)
ERYTHROCYTE [DISTWIDTH] IN BLOOD BY AUTOMATED COUNT: 14 % (ref 11.3–14.5)
GFR SERPL CREATININE-BSD FRML MDRD: 54 ML/MIN/1.73
GLOBULIN UR ELPH-MCNC: 2.4 GM/DL
GLUCOSE BLD-MCNC: 125 MG/DL (ref 70–100)
HBA1C MFR BLD: 7.1 %
HCT VFR BLD AUTO: 47.1 % (ref 38.9–50.9)
HDLC SERPL-MCNC: 24 MG/DL (ref 40–60)
HGB BLD-MCNC: 14.9 G/DL (ref 13.1–17.5)
MCH RBC QN AUTO: 31 PG (ref 27–31)
MCHC RBC AUTO-ENTMCNC: 31.6 G/DL (ref 32–36)
MCV RBC AUTO: 98.1 FL (ref 80–99)
PLATELET # BLD AUTO: 245 10*3/MM3 (ref 150–450)
PMV BLD AUTO: 12.2 FL (ref 6–12)
POC CREATININE URINE: 100
POC MICROALBUMIN URINE: 10
POTASSIUM BLD-SCNC: 4.3 MMOL/L (ref 3.5–5.5)
PROT SERPL-MCNC: 6.8 G/DL (ref 5.7–8.2)
PSA SERPL-MCNC: 1.1 NG/ML (ref 0–4)
RBC # BLD AUTO: 4.8 10*6/MM3 (ref 4.2–5.76)
SODIUM BLD-SCNC: 140 MMOL/L (ref 132–146)
T4 FREE SERPL-MCNC: 0.97 NG/DL (ref 0.89–1.76)
TRIGL SERPL-MCNC: 439 MG/DL (ref 0–150)
TSH SERPL DL<=0.05 MIU/L-ACNC: 2.37 MIU/ML (ref 0.35–5.35)
WBC NRBC COR # BLD: 12.07 10*3/MM3 (ref 3.5–10.8)

## 2018-06-26 PROCEDURE — 83036 HEMOGLOBIN GLYCOSYLATED A1C: CPT | Performed by: INTERNAL MEDICINE

## 2018-06-26 PROCEDURE — 84153 ASSAY OF PSA TOTAL: CPT | Performed by: INTERNAL MEDICINE

## 2018-06-26 PROCEDURE — G0439 PPPS, SUBSEQ VISIT: HCPCS | Performed by: INTERNAL MEDICINE

## 2018-06-26 PROCEDURE — 85027 COMPLETE CBC AUTOMATED: CPT | Performed by: INTERNAL MEDICINE

## 2018-06-26 PROCEDURE — 84443 ASSAY THYROID STIM HORMONE: CPT | Performed by: INTERNAL MEDICINE

## 2018-06-26 PROCEDURE — 82570 ASSAY OF URINE CREATININE: CPT | Performed by: INTERNAL MEDICINE

## 2018-06-26 PROCEDURE — 82306 VITAMIN D 25 HYDROXY: CPT | Performed by: INTERNAL MEDICINE

## 2018-06-26 PROCEDURE — 84439 ASSAY OF FREE THYROXINE: CPT | Performed by: INTERNAL MEDICINE

## 2018-06-26 PROCEDURE — 80053 COMPREHEN METABOLIC PANEL: CPT | Performed by: INTERNAL MEDICINE

## 2018-06-26 PROCEDURE — 80061 LIPID PANEL: CPT | Performed by: INTERNAL MEDICINE

## 2018-06-26 PROCEDURE — 82044 UR ALBUMIN SEMIQUANTITATIVE: CPT | Performed by: INTERNAL MEDICINE

## 2018-06-26 PROCEDURE — 99214 OFFICE O/P EST MOD 30 MIN: CPT | Performed by: INTERNAL MEDICINE

## 2018-06-26 RX ORDER — FLUOXETINE HYDROCHLORIDE 40 MG/1
80 CAPSULE ORAL DAILY
COMMUNITY
End: 2020-05-03 | Stop reason: SDUPTHER

## 2018-06-26 RX ORDER — LEVOTHYROXINE SODIUM 88 UG/1
88 TABLET ORAL DAILY
Qty: 90 TABLET | Refills: 1 | Status: SHIPPED | OUTPATIENT
Start: 2018-06-26 | End: 2018-11-30 | Stop reason: SDUPTHER

## 2018-06-26 RX ORDER — PIOGLITAZONEHYDROCHLORIDE 15 MG/1
15 TABLET ORAL DAILY
Qty: 30 TABLET | Refills: 5 | Status: SHIPPED | OUTPATIENT
Start: 2018-06-26 | End: 2018-11-30 | Stop reason: SDUPTHER

## 2018-06-26 RX ORDER — IPRATROPIUM BROMIDE AND ALBUTEROL SULFATE 2.5; .5 MG/3ML; MG/3ML
3 SOLUTION RESPIRATORY (INHALATION) EVERY 6 HOURS
Qty: 120 VIAL | Refills: 3 | Status: SHIPPED | OUTPATIENT
Start: 2018-06-26 | End: 2020-02-10 | Stop reason: SDUPTHER

## 2018-06-26 RX ORDER — GABAPENTIN 300 MG/1
CAPSULE ORAL
Qty: 210 CAPSULE | Refills: 0 | Status: SHIPPED | OUTPATIENT
Start: 2018-06-26 | End: 2018-06-26 | Stop reason: SDUPTHER

## 2018-06-26 RX ORDER — HYDROCODONE BITARTRATE AND ACETAMINOPHEN 5; 325 MG/1; MG/1
1 TABLET ORAL EVERY 8 HOURS
Refills: 0 | COMMUNITY
Start: 2018-05-07 | End: 2019-12-09 | Stop reason: DRUGHIGH

## 2018-06-26 RX ORDER — GABAPENTIN 300 MG/1
CAPSULE ORAL
Qty: 49 CAPSULE | Refills: 0 | Status: SHIPPED | OUTPATIENT
Start: 2018-06-26 | End: 2018-11-30 | Stop reason: DRUGHIGH

## 2018-06-26 NOTE — PROGRESS NOTES
QUICK REFERENCE INFORMATION:  The ABCs of the Annual Wellness Visit    Subsequent Medicare Wellness Visit    HEALTH RISK ASSESSMENT    1960    Recent Hospitalizations:  No hospitalization(s) within the last year..        Current Medical Providers:  Patient Care Team:  Gladys Tello MD as PCP - General  RADHA Strickland as PCP - Claims Attributed        Smoking Status:  History   Smoking Status   • Current Every Day Smoker   • Types: Cigarettes   Smokeless Tobacco   • Never Used       Alcohol Consumption:  History   Alcohol Use No       Depression Screen:   PHQ-2/PHQ-9 Depression Screening 6/26/2018   Little interest or pleasure in doing things 3   Feeling down, depressed, or hopeless 3   Trouble falling or staying asleep, or sleeping too much 3   Feeling tired or having little energy 3   Poor appetite or overeating 3   Feeling bad about yourself - or that you are a failure or have let yourself or your family down 2   Trouble concentrating on things, such as reading the newspaper or watching television 3   Moving or speaking so slowly that other people could have noticed. Or the opposite - being so fidgety or restless that you have been moving around a lot more than usual 1   Thoughts that you would be better off dead, or of hurting yourself in some way 0   Total Score 21   If you checked off any problems, how difficult have these problems made it for you to do your work, take care of things at home, or get along with other people? Extremely dIfficult       Health Habits and Functional and Cognitive Screening:  Functional & Cognitive Status 6/26/2018   Do you have difficulty preparing food and eating? No   Do you have difficulty bathing yourself, getting dressed or grooming yourself? Yes   Do you have difficulty using the toilet? No   Do you have difficulty moving around from place to place? Yes   Do you have trouble with steps or getting out of a bed or a chair? Yes   In the past year have you fallen or  experienced a near fall? Yes   Current Diet Unhealthy Diet   Dental Exam -   Eye Exam -   Exercise (times per week) 0 times per week   Current Exercise Activities Include -   Do you need help using the phone?  No   Are you deaf or do you have serious difficulty hearing?  No   Do you need help with transportation? No   Do you need help shopping? No   Do you need help preparing meals?  No   Do you need help with housework?  Yes   Do you need help with laundry? No   Do you need help taking your medications? No   Do you need help managing money? No   Do you ever drive or ride in a car without wearing a seat belt? No   Have you felt unusual stress, anger or loneliness in the last month? Yes   Who do you live with? Spouse   If you need help, do you have trouble finding someone available to you? No   Have you been bothered in the last four weeks by sexual problems? No   Do you have difficulty concentrating, remembering or making decisions? No           Does the patient have evidence of cognitive impairment? No    Aspirin use counseling: Taking ASA appropriately as indicated      Recent Lab Results:  CMP:  Lab Results   Component Value Date    BUN 17 04/25/2017    CREATININE 1.30 04/25/2017    EGFRIFNONA 57 (L) 04/25/2017    BCR 13.1 04/25/2017     04/25/2017    K 4.8 04/25/2017    CO2 30.0 04/25/2017    CALCIUM 10.4 04/25/2017    ALBUMIN 4.50 04/25/2017    LABIL2 1.6 04/25/2017    BILITOT 0.4 04/25/2017    ALKPHOS 84 04/25/2017    AST 19 04/25/2017    ALT 22 04/25/2017     Lipid Panel:  Lab Results   Component Value Date    CHOL 120 04/25/2017    TRIG 211 (H) 04/25/2017    HDL 25 (L) 04/25/2017     HbA1c:  Lab Results   Component Value Date    HGBA1C 7.1 06/26/2018       Visual Acuity:  No exam data present    Age-appropriate Screening Schedule:  Refer to the list below for future screening recommendations based on patient's age, sex and/or medical conditions. Orders for these recommended tests are listed in the plan  section. The patient has been provided with a written plan.    Health Maintenance   Topic Date Due   • ZOSTER VACCINE (1 of 2) 09/10/2021 (Originally 9/29/2008)   • HEMOGLOBIN A1C  07/19/2018   • INFLUENZA VACCINE  08/01/2018   • DIABETIC EYE EXAM  03/03/2019   • DIABETIC FOOT EXAM  06/26/2019   • LIPID PANEL  06/26/2019   • URINE MICROALBUMIN  06/26/2019   • COLONOSCOPY  08/07/2023   • PNEUMOCOCCAL VACCINE (19-64 MEDIUM RISK)  Completed   • TDAP/TD VACCINES  Excluded        Subjective   History of Present Illness    Carmelo Saucedo is a 57 y.o. male who presents for an Subsequent Wellness Visit. And follow upon his HTN, HLP,DM2, HLP. Started on actos last visit, would like 90 day Rx.  Followed by Pain management, who are agreeable to taking over the gabapentin.Has appt. End of July.    The following portions of the patient's history were reviewed and updated as appropriate: allergies, current medications, past family history, past medical history, past social history, past surgical history and problem list.    Outpatient Medications Prior to Visit   Medication Sig Dispense Refill   • acyclovir (ZOVIRAX) 400 MG tablet take 1 tablet by mouth twice a day 60 tablet 5   • aspirin 81 MG chewable tablet Chew 81 mg Daily.     • atorvastatin (LIPITOR) 20 MG tablet Take 1 tablet by mouth Every Night. 90 tablet 1   • Cholecalciferol (VITAMIN D3) 5000 UNITS capsule capsule Take 1 capsule by mouth daily. 30 capsule 5   • clonazePAM (KlonoPIN) 1 MG tablet Take 1 tablet by mouth 2 (Two) Times a Day.  0   • cyclobenzaprine (FLEXERIL) 10 MG tablet take 1 tablet by mouth every evening 90 tablet 1   • DULoxetine (CYMBALTA) 60 MG capsule Take 60 mg by mouth Daily.     • glucose blood (NADEEM CONTOUR NEXT TEST) test strip 1 strip daily.     • hydrochlorothiazide (MICROZIDE) 12.5 MG capsule take 1 capsule by mouth every morning 90 capsule 1   • hydrocortisone (ANUSOL-HC) 2.5 % rectal cream Insert  into the rectum 2 (Two) Times a Day. 30 g 3    • Lancets (FREESTYLE) lancets FOR ONCE DAILY TESTING 100 each 3   • lisinopril (PRINIVIL,ZESTRIL) 5 MG tablet take 1 tablet by mouth once daily 90 tablet 0   • lithium 600 MG capsule Take 1 capsule by mouth 3 (Three) Times a Day.  0   • meloxicam (MOBIC) 15 MG tablet take 1 tablet by mouth once daily with food 90 tablet 0   • metFORMIN (GLUCOPHAGE) 1000 MG tablet take 1.5 tablets by mouth every morning then 1 tablet by mouth every evening with food 75 tablet 5   • metoprolol succinate XL (TOPROL-XL) 50 MG 24 hr tablet take 1 tablet by mouth once daily 90 tablet 1   • nitroglycerin (NITROSTAT) 0.4 MG SL tablet Place 1 tablet under the tongue Every 5 (Five) Minutes As Needed for Chest Pain. Only take up to 3 tablets. Call 911 if pain persists. 30 tablet 0   • nystatin (MYCOSTATIN) 791170 UNIT/GM cream Apply  topically 2 (Two) Times a Day. 15 g 0   • pantoprazole (PROTONIX) 40 MG EC tablet take 1 tablet by mouth once daily 90 tablet 0   • QUEtiapine (SEROquel) 400 MG tablet Take 2 tablets by mouth every night.     • tamsulosin (FLOMAX) 0.4 MG capsule 24 hr capsule take 2 capsules by mouth at bedtime 180 capsule 1   • topiramate (TOPAMAX) 100 MG tablet Take 1 tablet by mouth Every Night. 90 tablet 1   • traZODone (DESYREL) 50 MG tablet Take 1 tablet by mouth every night.     • COMBIVENT RESPIMAT  MCG/ACT inhaler inhale 1 puff by mouth four times a day 12 g 1   • gabapentin (NEURONTIN) 300 MG capsule Take 2 AM, 2  PM and 3  capsule 0   • ipratropium-albuterol (DUONEB) 0.5-2.5 mg/mL nebulizer Take 3 mL by nebulization Every 6 (Six) Hours. 120 vial 3   • levothyroxine (SYNTHROID, LEVOTHROID) 88 MCG tablet Take 1 tablet by mouth Daily. Take 1 tablet daily on empty stomach preferably take 1/2 tablet 1 hour before breakfast. 90 tablet 1   • pioglitazone (ACTOS) 15 MG tablet Take 1 tablet by mouth Daily. 30 tablet 5   • venlafaxine XR (EFFEXOR-XR) 150 MG 24 hr capsule Take 1 capsule by mouth Daily. Weaning off  this drug. On 01-24-18 drop dosage down to 25 mg daily. Due to being on Cymbalta.     • vitamin D (ERGOCALCIFEROL) 54688 UNITS capsule capsule Take 1 capsule by mouth 1 (One) Time Per Week. 4 capsule 3     No facility-administered medications prior to visit.        Patient Active Problem List   Diagnosis   • Type 2 diabetes mellitus   • Diabetic peripheral neuropathy   • Gastroesophageal reflux disease with esophagitis   • Hypertension   • Hypothyroidism   • Low back pain   • Congestive cardiomyopathy   • Enlarged prostate   • Chronic obstructive pulmonary disease   • Anxiety disorder   • Arthritis   • CHF (congestive heart failure)   • Chronic pain   • Chronic pancreatitis   • Chronic prostatitis   • Depression   • Osteoarthritis   • Schizoaffective disorder, bipolar type   • Tobacco use   • H/O tobacco use, presenting hazards to health   • Migraine without status migrainosus, not intractable   • Hyperlipidemia   • Balanitis       Advance Care Planning:  has an advance directive - a copy HAS NOT been provided. Have asked the patient to send this to us to add to record.    Identification of Risk Factors:  Risk factors include: weight , unhealthy diet, cardiovascular risk, inactivity, increased fall risk, depression, financial stress, vision limitations and polypharmacy.    Review of Systems   Constitutional: Positive for activity change and fatigue. Negative for chills and fever.   HENT: Negative for ear discharge, ear pain, sinus pressure and sore throat.    Eyes: Negative for photophobia and redness.   Respiratory: Negative for cough, chest tightness and shortness of breath.    Cardiovascular: Negative for chest pain, palpitations and leg swelling.   Gastrointestinal: Negative for diarrhea, nausea and vomiting.   Endocrine: Negative for polydipsia and polyphagia.   Genitourinary: Negative for flank pain, frequency and urgency.   Musculoskeletal: Positive for arthralgias, gait problem and myalgias. Negative for  back pain.   Neurological: Positive for weakness and numbness. Negative for dizziness, syncope and headaches.   Psychiatric/Behavioral: Negative for confusion and sleep disturbance.       Compared to one year ago, the patient feels his physical health is worse.  Compared to one year ago, the patient feels his mental health is the same.    Objective     Physical Exam   Constitutional: He is oriented to person, place, and time. He appears well-developed and well-nourished.   HENT:   Head: Normocephalic and atraumatic.   Right Ear: External ear normal.   Left Ear: External ear normal.   Mouth/Throat: No oropharyngeal exudate.   Eyes: Conjunctivae are normal. Pupils are equal, round, and reactive to light.   Neck: Neck supple. No thyromegaly present.   Cardiovascular: Normal rate, regular rhythm and intact distal pulses.    No murmur heard.  Pulmonary/Chest: Effort normal and breath sounds normal. He has no wheezes. He has no rales.   Abdominal: Soft. Bowel sounds are normal. He exhibits no distension and no mass. There is no tenderness. There is no rebound.   Musculoskeletal: He exhibits tenderness. He exhibits no edema.    Carmelo had a diabetic foot exam performed today.   During the foot exam he had a monofilament test performed (wnl).  Vascular Status -  His right foot exhibits normal foot vasculature . His left foot exhibits normal foot vasculature .  Neurological: He is alert and oriented to person, place, and time. No cranial nerve deficit.   Skin: Skin is warm and dry.   Psychiatric: He has a normal mood and affect. His behavior is normal. Judgment and thought content normal.   Nursing note and vitals reviewed.      Vitals:    06/26/18 0851   BP: 108/74   Pulse: 74   Weight: 116 kg (254 lb 12.8 oz)   PainSc:   5       Patient's Body mass index is 33.62 kg/m². BMI is above normal parameters. Recommendations include: exercise counseling and nutrition counseling.    Results for orders placed or performed in visit on  06/26/18   POC Glycosylated Hemoglobin (Hb A1C)   Result Value Ref Range    Hemoglobin A1C 7.1 %       Carmelo was seen today for medicare wellness.    Diagnoses and all orders for this visit:    Type 2 diabetes mellitus with other specified complication, without long-term current use of insulin  -     POC Glycosylated Hemoglobin (Hb A1C)  -     pioglitazone (ACTOS) 15 MG tablet; Take 1 tablet by mouth Daily.    Medicare annual wellness visit, subsequent    Essential hypertension  -     Comprehensive Metabolic Panel    Mixed hyperlipidemia  -     Lipid Panel    Chronic obstructive pulmonary disease with acute exacerbation  -     CBC (No Diff)  -     ipratropium-albuterol (DUONEB) 0.5-2.5 mg/3 ml nebulizer; Take 3 mL by nebulization Every 6 (Six) Hours.  -     ipratropium-albuterol (COMBIVENT RESPIMAT)  MCG/ACT inhaler; Inhale 1 puff 4 (Four) Times a Day.    Acquired hypothyroidism  -     TSH  -     T4, Free  -     levothyroxine (SYNTHROID, LEVOTHROID) 88 MCG tablet; Take 1 tablet by mouth Daily.    Chronic prostatitis  -     PSA DIAGNOSTIC    Vitamin D deficiency  -     Vitamin D 25 Hydroxy    Enlarged prostate  -     PSA DIAGNOSTIC    Diabetic peripheral neuropathy  Comments:  He is able to stop the lyrica  and will continue gabapentin on a slightly higher dose.    Orders:  -     Discontinue: gabapentin (NEURONTIN) 300 MG capsule; Take 2 AM, 2  PM and 3 HS  -     gabapentin (NEURONTIN) 300 MG capsule; Take 2 AM, 2  PM and 3 HS.  Fill after 7/26/18    The patient has read and signed the [facility] Controlled Substance Contract.  I will continue to see patient for regular follow up appointments.  They are well controlled on their medication.  SOLITARIO has been reviewed by me and is updated every 3 months. The patient is aware of the potential for addiction and dependence.      Assessment/Plan   Patient Self-Management and Personalized Health Advice  The patient has been provided with information about: diet,  exercise, weight management, prevention of cardiac or vascular disease, fall prevention and supplements and preventive services including:   · Advance directive, Diabetes screening, see lab orders, Exercise counseling provided, Fall Risk assessment done, Glaucoma screening recommended, Nutrition counseling provided, Smoking cessation counseling completed, Zostavax vaccine (Herpes Zoster).    Visit Diagnoses:    ICD-10-CM ICD-9-CM   1. Type 2 diabetes mellitus with other specified complication, without long-term current use of insulin E11.69 250.80   2. Medicare annual wellness visit, subsequent Z00.00 V70.0   3. Essential hypertension I10 401.9   4. Mixed hyperlipidemia E78.2 272.2   5. Chronic obstructive pulmonary disease with acute exacerbation J44.1 491.21   6. Acquired hypothyroidism E03.9 244.9   7. Chronic prostatitis N41.1 601.1   8. Vitamin D deficiency E55.9 268.9   9. Enlarged prostate N40.0 600.00   10. Diabetic peripheral neuropathy E11.42 250.60     357.2       Orders Placed This Encounter   Procedures   • CBC (No Diff)   • Comprehensive Metabolic Panel   • Lipid Panel   • TSH   • T4, Free   • Vitamin D 25 Hydroxy   • PSA DIAGNOSTIC   • POC Glycosylated Hemoglobin (Hb A1C)       Outpatient Encounter Prescriptions as of 6/26/2018   Medication Sig Dispense Refill   • acyclovir (ZOVIRAX) 400 MG tablet take 1 tablet by mouth twice a day 60 tablet 5   • aspirin 81 MG chewable tablet Chew 81 mg Daily.     • atorvastatin (LIPITOR) 20 MG tablet Take 1 tablet by mouth Every Night. 90 tablet 1   • Cholecalciferol (VITAMIN D3) 5000 UNITS capsule capsule Take 1 capsule by mouth daily. 30 capsule 5   • clonazePAM (KlonoPIN) 1 MG tablet Take 1 tablet by mouth 2 (Two) Times a Day.  0   • cyclobenzaprine (FLEXERIL) 10 MG tablet take 1 tablet by mouth every evening 90 tablet 1   • DULoxetine (CYMBALTA) 60 MG capsule Take 60 mg by mouth Daily.     • FLUoxetine (PROzac) 40 MG capsule Take 40 mg by mouth Daily.     •  gabapentin (NEURONTIN) 300 MG capsule Take 2 AM, 2  PM and 3 HS.  Fill after 7/26/18 49 capsule 0   • glucose blood (NADEEM CONTOUR NEXT TEST) test strip 1 strip daily.     • hydrochlorothiazide (MICROZIDE) 12.5 MG capsule take 1 capsule by mouth every morning 90 capsule 1   • hydrocortisone (ANUSOL-HC) 2.5 % rectal cream Insert  into the rectum 2 (Two) Times a Day. 30 g 3   • ipratropium-albuterol (COMBIVENT RESPIMAT)  MCG/ACT inhaler Inhale 1 puff 4 (Four) Times a Day. 12 g 3   • ipratropium-albuterol (DUONEB) 0.5-2.5 mg/3 ml nebulizer Take 3 mL by nebulization Every 6 (Six) Hours. 120 vial 3   • Lancets (FREESTYLE) lancets FOR ONCE DAILY TESTING 100 each 3   • levothyroxine (SYNTHROID, LEVOTHROID) 88 MCG tablet Take 1 tablet by mouth Daily. 90 tablet 1   • lisinopril (PRINIVIL,ZESTRIL) 5 MG tablet take 1 tablet by mouth once daily 90 tablet 0   • lithium 600 MG capsule Take 1 capsule by mouth 3 (Three) Times a Day.  0   • meloxicam (MOBIC) 15 MG tablet take 1 tablet by mouth once daily with food 90 tablet 0   • metFORMIN (GLUCOPHAGE) 1000 MG tablet take 1.5 tablets by mouth every morning then 1 tablet by mouth every evening with food 75 tablet 5   • metoprolol succinate XL (TOPROL-XL) 50 MG 24 hr tablet take 1 tablet by mouth once daily 90 tablet 1   • nitroglycerin (NITROSTAT) 0.4 MG SL tablet Place 1 tablet under the tongue Every 5 (Five) Minutes As Needed for Chest Pain. Only take up to 3 tablets. Call 911 if pain persists. 30 tablet 0   • nystatin (MYCOSTATIN) 368424 UNIT/GM cream Apply  topically 2 (Two) Times a Day. 15 g 0   • pantoprazole (PROTONIX) 40 MG EC tablet take 1 tablet by mouth once daily 90 tablet 0   • pioglitazone (ACTOS) 15 MG tablet Take 1 tablet by mouth Daily. 30 tablet 5   • QUEtiapine (SEROquel) 400 MG tablet Take 2 tablets by mouth every night.     • tamsulosin (FLOMAX) 0.4 MG capsule 24 hr capsule take 2 capsules by mouth at bedtime 180 capsule 1   • topiramate (TOPAMAX) 100 MG  tablet Take 1 tablet by mouth Every Night. 90 tablet 1   • traZODone (DESYREL) 50 MG tablet Take 1 tablet by mouth every night.     • [DISCONTINUED] COMBIVENT RESPIMAT  MCG/ACT inhaler inhale 1 puff by mouth four times a day 12 g 1   • [DISCONTINUED] gabapentin (NEURONTIN) 300 MG capsule Take 2 AM, 2  PM and 3  capsule 0   • [DISCONTINUED] gabapentin (NEURONTIN) 300 MG capsule Take 2 AM, 2  PM and 3  capsule 0   • [DISCONTINUED] ipratropium-albuterol (DUONEB) 0.5-2.5 mg/mL nebulizer Take 3 mL by nebulization Every 6 (Six) Hours. 120 vial 3   • [DISCONTINUED] levothyroxine (SYNTHROID, LEVOTHROID) 88 MCG tablet Take 1 tablet by mouth Daily. Take 1 tablet daily on empty stomach preferably take 1/2 tablet 1 hour before breakfast. 90 tablet 1   • [DISCONTINUED] pioglitazone (ACTOS) 15 MG tablet Take 1 tablet by mouth Daily. 30 tablet 5   • HYDROcodone-acetaminophen (NORCO) 5-325 MG per tablet Take 1 tablet by mouth Every 8 (Eight) Hours.  0   • [DISCONTINUED] venlafaxine XR (EFFEXOR-XR) 150 MG 24 hr capsule Take 1 capsule by mouth Daily. Weaning off this drug. On 01-24-18 drop dosage down to 25 mg daily. Due to being on Cymbalta.     • [DISCONTINUED] vitamin D (ERGOCALCIFEROL) 04891 UNITS capsule capsule Take 1 capsule by mouth 1 (One) Time Per Week. 4 capsule 3     No facility-administered encounter medications on file as of 6/26/2018.        Reviewed use of high risk medication in the elderly: yes  Reviewed for potential of harmful drug interactions in the elderly: yes    Follow Up:  Return in about 4 months (around 10/26/2018) for Next scheduled follow up.     An After Visit Summary and PPPS with all of these plans were given to the patient.

## 2018-06-27 ENCOUNTER — TELEPHONE (OUTPATIENT)
Dept: INTERNAL MEDICINE | Facility: CLINIC | Age: 58
End: 2018-06-27

## 2018-06-27 NOTE — TELEPHONE ENCOUNTER
PATIENT STATES PHARMACY HAS FAXED US A DWO FORM TO FILL OUT IN ORDER FOR PATIENT TO GET INHALERS. PHARMACY FAXED PAPER YESTERDAY AND PATIENT NEEDS US TO FILL THE FORMS OUT AND SEND BACK TO PHARMACY IN ORDER FOR PATIENT TO  PRESCRIPTION FOR INHALER. PATIENTS PHONE NUMBER [phone]

## 2018-06-27 NOTE — TELEPHONE ENCOUNTER
PT CALLED BACK AND SAID DWO-DETAILED WRITTEN ORDER  HE WANTED TO MAKE SURE WE KNEW WHAT THIS WAS

## 2018-07-04 RX ORDER — ERGOCALCIFEROL 1.25 MG/1
50000 CAPSULE ORAL WEEKLY
Qty: 4 CAPSULE | Refills: 5 | Status: SHIPPED | OUTPATIENT
Start: 2018-07-04 | End: 2020-06-23

## 2018-08-01 DIAGNOSIS — E78.2 MIXED HYPERLIPIDEMIA: ICD-10-CM

## 2018-08-01 RX ORDER — ATORVASTATIN CALCIUM 20 MG/1
TABLET, FILM COATED ORAL
Qty: 90 TABLET | Refills: 1 | Status: SHIPPED | OUTPATIENT
Start: 2018-08-01 | End: 2019-01-29 | Stop reason: SDUPTHER

## 2018-08-09 DIAGNOSIS — I10 ESSENTIAL HYPERTENSION: ICD-10-CM

## 2018-08-09 RX ORDER — HYDROCHLOROTHIAZIDE 12.5 MG/1
CAPSULE, GELATIN COATED ORAL
Qty: 90 CAPSULE | Refills: 1 | Status: SHIPPED | OUTPATIENT
Start: 2018-08-09 | End: 2019-02-11 | Stop reason: SDUPTHER

## 2018-08-21 RX ORDER — TAMSULOSIN HYDROCHLORIDE 0.4 MG/1
CAPSULE ORAL
Qty: 180 CAPSULE | Refills: 1 | Status: SHIPPED | OUTPATIENT
Start: 2018-08-21 | End: 2019-02-28 | Stop reason: SDUPTHER

## 2018-09-19 RX ORDER — LISINOPRIL 5 MG/1
TABLET ORAL
Qty: 90 TABLET | Refills: 0 | Status: SHIPPED | OUTPATIENT
Start: 2018-09-19 | End: 2018-11-30 | Stop reason: SDUPTHER

## 2018-09-19 RX ORDER — PANTOPRAZOLE SODIUM 40 MG/1
TABLET, DELAYED RELEASE ORAL
Qty: 90 TABLET | Refills: 0 | Status: SHIPPED | OUTPATIENT
Start: 2018-09-19 | End: 2018-11-30 | Stop reason: SDUPTHER

## 2018-10-23 RX ORDER — METOPROLOL SUCCINATE 50 MG/1
TABLET, EXTENDED RELEASE ORAL
Qty: 90 TABLET | Refills: 1 | Status: SHIPPED | OUTPATIENT
Start: 2018-10-23 | End: 2019-04-16 | Stop reason: SDUPTHER

## 2018-11-30 ENCOUNTER — OFFICE VISIT (OUTPATIENT)
Dept: INTERNAL MEDICINE | Facility: CLINIC | Age: 58
End: 2018-11-30

## 2018-11-30 VITALS
DIASTOLIC BLOOD PRESSURE: 80 MMHG | HEART RATE: 79 BPM | SYSTOLIC BLOOD PRESSURE: 130 MMHG | BODY MASS INDEX: 33.96 KG/M2 | WEIGHT: 257.4 LBS | OXYGEN SATURATION: 98 %

## 2018-11-30 DIAGNOSIS — E78.49 OTHER HYPERLIPIDEMIA: ICD-10-CM

## 2018-11-30 DIAGNOSIS — E11.69 TYPE 2 DIABETES MELLITUS WITH OTHER SPECIFIED COMPLICATION, WITHOUT LONG-TERM CURRENT USE OF INSULIN (HCC): Primary | ICD-10-CM

## 2018-11-30 DIAGNOSIS — I10 HYPERTENSION, UNSPECIFIED TYPE: ICD-10-CM

## 2018-11-30 DIAGNOSIS — E03.9 ACQUIRED HYPOTHYROIDISM: ICD-10-CM

## 2018-11-30 DIAGNOSIS — L57.0 ACTINIC KERATOSIS: ICD-10-CM

## 2018-11-30 LAB
ALBUMIN SERPL-MCNC: 4.69 G/DL (ref 3.2–4.8)
ALBUMIN/GLOB SERPL: 2.2 G/DL (ref 1.5–2.5)
ALP SERPL-CCNC: 82 U/L (ref 25–100)
ALT SERPL W P-5'-P-CCNC: 19 U/L (ref 7–40)
ANION GAP SERPL CALCULATED.3IONS-SCNC: 4 MMOL/L (ref 3–11)
ARTICHOKE IGE QN: 69 MG/DL (ref 0–130)
AST SERPL-CCNC: 16 U/L (ref 0–33)
BILIRUB SERPL-MCNC: 0.3 MG/DL (ref 0.3–1.2)
BUN BLD-MCNC: 12 MG/DL (ref 9–23)
BUN/CREAT SERPL: 8.6 (ref 7–25)
CALCIUM SPEC-SCNC: 9.5 MG/DL (ref 8.7–10.4)
CHLORIDE SERPL-SCNC: 99 MMOL/L (ref 99–109)
CHOLEST SERPL-MCNC: 132 MG/DL (ref 0–200)
CO2 SERPL-SCNC: 30 MMOL/L (ref 20–31)
CREAT BLD-MCNC: 1.4 MG/DL (ref 0.6–1.3)
GFR SERPL CREATININE-BSD FRML MDRD: 52 ML/MIN/1.73
GLOBULIN UR ELPH-MCNC: 2.1 GM/DL
GLUCOSE BLD-MCNC: 136 MG/DL (ref 70–100)
GLUCOSE BLDC GLUCOMTR-MCNC: 149 MG/DL (ref 70–130)
HBA1C MFR BLD: 7.8 %
HDLC SERPL-MCNC: 25 MG/DL (ref 40–60)
POTASSIUM BLD-SCNC: 4.1 MMOL/L (ref 3.5–5.5)
PROT SERPL-MCNC: 6.8 G/DL (ref 5.7–8.2)
SODIUM BLD-SCNC: 133 MMOL/L (ref 132–146)
TRIGL SERPL-MCNC: 331 MG/DL (ref 0–150)
TSH SERPL DL<=0.05 MIU/L-ACNC: 3 MIU/ML (ref 0.35–5.35)

## 2018-11-30 PROCEDURE — 84443 ASSAY THYROID STIM HORMONE: CPT | Performed by: INTERNAL MEDICINE

## 2018-11-30 PROCEDURE — 80053 COMPREHEN METABOLIC PANEL: CPT | Performed by: INTERNAL MEDICINE

## 2018-11-30 PROCEDURE — 80061 LIPID PANEL: CPT | Performed by: INTERNAL MEDICINE

## 2018-11-30 PROCEDURE — 82947 ASSAY GLUCOSE BLOOD QUANT: CPT | Performed by: INTERNAL MEDICINE

## 2018-11-30 PROCEDURE — 99214 OFFICE O/P EST MOD 30 MIN: CPT | Performed by: INTERNAL MEDICINE

## 2018-11-30 PROCEDURE — 83036 HEMOGLOBIN GLYCOSYLATED A1C: CPT | Performed by: INTERNAL MEDICINE

## 2018-11-30 RX ORDER — PANTOPRAZOLE SODIUM 40 MG/1
40 TABLET, DELAYED RELEASE ORAL DAILY
Qty: 90 TABLET | Refills: 1 | Status: SHIPPED | OUTPATIENT
Start: 2018-11-30 | End: 2019-06-17 | Stop reason: SDUPTHER

## 2018-11-30 RX ORDER — FLUOXETINE HYDROCHLORIDE 20 MG/1
1 CAPSULE ORAL DAILY
Refills: 0 | COMMUNITY
Start: 2018-11-18 | End: 2020-02-11

## 2018-11-30 RX ORDER — TRAZODONE HYDROCHLORIDE 100 MG/1
2 TABLET ORAL NIGHTLY
Refills: 0 | COMMUNITY
Start: 2018-11-15 | End: 2020-05-03 | Stop reason: SDUPTHER

## 2018-11-30 RX ORDER — LEVOTHYROXINE SODIUM 88 UG/1
88 TABLET ORAL DAILY
Qty: 90 TABLET | Refills: 1 | Status: SHIPPED | OUTPATIENT
Start: 2018-11-30 | End: 2019-04-16 | Stop reason: SDUPTHER

## 2018-11-30 RX ORDER — GABAPENTIN 600 MG/1
1 TABLET ORAL 4 TIMES DAILY
Refills: 0 | COMMUNITY
Start: 2018-11-01 | End: 2019-10-23

## 2018-11-30 RX ORDER — LISINOPRIL 5 MG/1
5 TABLET ORAL DAILY
Qty: 90 TABLET | Refills: 1 | Status: SHIPPED | OUTPATIENT
Start: 2018-11-30 | End: 2019-04-16 | Stop reason: SDUPTHER

## 2018-11-30 RX ORDER — PIOGLITAZONEHYDROCHLORIDE 15 MG/1
15 TABLET ORAL DAILY
Qty: 90 TABLET | Refills: 1 | Status: SHIPPED | OUTPATIENT
Start: 2018-11-30 | End: 2019-05-31 | Stop reason: SDUPTHER

## 2019-01-29 DIAGNOSIS — E78.2 MIXED HYPERLIPIDEMIA: ICD-10-CM

## 2019-01-29 RX ORDER — ATORVASTATIN CALCIUM 20 MG/1
TABLET, FILM COATED ORAL
Qty: 90 TABLET | Refills: 1 | Status: SHIPPED | OUTPATIENT
Start: 2019-01-29 | End: 2019-07-31 | Stop reason: SDUPTHER

## 2019-02-11 DIAGNOSIS — I10 ESSENTIAL HYPERTENSION: ICD-10-CM

## 2019-02-11 RX ORDER — HYDROCHLOROTHIAZIDE 12.5 MG/1
CAPSULE, GELATIN COATED ORAL
Qty: 90 CAPSULE | Refills: 1 | Status: SHIPPED | OUTPATIENT
Start: 2019-02-11 | End: 2019-08-19 | Stop reason: SDUPTHER

## 2019-02-25 DIAGNOSIS — B00.9 HSV-2 INFECTION: ICD-10-CM

## 2019-02-25 RX ORDER — ACYCLOVIR 400 MG/1
TABLET ORAL
Qty: 60 TABLET | Refills: 5 | Status: SHIPPED | OUTPATIENT
Start: 2019-02-25 | End: 2019-12-28

## 2019-02-28 RX ORDER — TAMSULOSIN HYDROCHLORIDE 0.4 MG/1
CAPSULE ORAL
Qty: 180 CAPSULE | Refills: 1 | Status: SHIPPED | OUTPATIENT
Start: 2019-02-28 | End: 2019-10-18

## 2019-04-16 ENCOUNTER — OFFICE VISIT (OUTPATIENT)
Dept: INTERNAL MEDICINE | Facility: CLINIC | Age: 59
End: 2019-04-16

## 2019-04-16 VITALS
SYSTOLIC BLOOD PRESSURE: 128 MMHG | BODY MASS INDEX: 34.33 KG/M2 | HEART RATE: 74 BPM | DIASTOLIC BLOOD PRESSURE: 78 MMHG | OXYGEN SATURATION: 95 % | WEIGHT: 259 LBS | RESPIRATION RATE: 20 BRPM | HEIGHT: 73 IN

## 2019-04-16 DIAGNOSIS — H04.123 CHRONIC DRYNESS OF BOTH EYES: ICD-10-CM

## 2019-04-16 DIAGNOSIS — E03.9 ACQUIRED HYPOTHYROIDISM: ICD-10-CM

## 2019-04-16 DIAGNOSIS — I10 ESSENTIAL HYPERTENSION: ICD-10-CM

## 2019-04-16 DIAGNOSIS — E11.69 TYPE 2 DIABETES MELLITUS WITH OTHER SPECIFIED COMPLICATION, WITHOUT LONG-TERM CURRENT USE OF INSULIN (HCC): Primary | ICD-10-CM

## 2019-04-16 DIAGNOSIS — E78.2 MIXED HYPERLIPIDEMIA: ICD-10-CM

## 2019-04-16 DIAGNOSIS — I10 HYPERTENSION, UNSPECIFIED TYPE: ICD-10-CM

## 2019-04-16 LAB
GLUCOSE BLDC GLUCOMTR-MCNC: 128 MG/DL (ref 70–130)
HBA1C MFR BLD: 7.1 %

## 2019-04-16 PROCEDURE — 82947 ASSAY GLUCOSE BLOOD QUANT: CPT | Performed by: INTERNAL MEDICINE

## 2019-04-16 PROCEDURE — 84443 ASSAY THYROID STIM HORMONE: CPT | Performed by: INTERNAL MEDICINE

## 2019-04-16 PROCEDURE — 80061 LIPID PANEL: CPT | Performed by: INTERNAL MEDICINE

## 2019-04-16 PROCEDURE — 80053 COMPREHEN METABOLIC PANEL: CPT | Performed by: INTERNAL MEDICINE

## 2019-04-16 PROCEDURE — 99214 OFFICE O/P EST MOD 30 MIN: CPT | Performed by: INTERNAL MEDICINE

## 2019-04-16 PROCEDURE — 83036 HEMOGLOBIN GLYCOSYLATED A1C: CPT | Performed by: INTERNAL MEDICINE

## 2019-04-16 RX ORDER — LEVOTHYROXINE SODIUM 88 UG/1
88 TABLET ORAL DAILY
Qty: 90 TABLET | Refills: 1 | Status: SHIPPED | OUTPATIENT
Start: 2019-04-16 | End: 2020-02-16

## 2019-04-16 RX ORDER — METOPROLOL SUCCINATE 50 MG/1
50 TABLET, EXTENDED RELEASE ORAL DAILY
Qty: 90 TABLET | Refills: 1 | Status: SHIPPED | OUTPATIENT
Start: 2019-04-16 | End: 2019-10-18

## 2019-04-16 RX ORDER — CYCLOSPORINE 0.5 MG/ML
1 EMULSION OPHTHALMIC NIGHTLY
Qty: 5.5 ML | Refills: 5 | Status: SHIPPED | OUTPATIENT
Start: 2019-04-16 | End: 2020-06-23

## 2019-04-16 RX ORDER — LISINOPRIL 5 MG/1
5 TABLET ORAL DAILY
Qty: 90 TABLET | Refills: 1 | Status: SHIPPED | OUTPATIENT
Start: 2019-04-16 | End: 2020-05-01

## 2019-04-16 NOTE — PROGRESS NOTES
Diabetes    Subjective   Carmelo Saucedo is a 58 y.o. male is here today for follow-up.    History of Present Illness   Here for a follow up on his DM2, htn, hlp.  Doing well on current meds, denies side effects.  Reports eyes are red and painful.      Current Outpatient Medications:   •  acyclovir (ZOVIRAX) 400 MG tablet, take 1 tablet by mouth twice a day, Disp: 60 tablet, Rfl: 5  •  aspirin 81 MG chewable tablet, Chew 81 mg Daily., Disp: , Rfl:   •  atorvastatin (LIPITOR) 20 MG tablet, take 1 tablet by mouth every evening, Disp: 90 tablet, Rfl: 1  •  Cholecalciferol (VITAMIN D3) 5000 UNITS capsule capsule, Take 1 capsule by mouth daily., Disp: 30 capsule, Rfl: 5  •  clonazePAM (KlonoPIN) 1 MG tablet, Take 1 tablet by mouth 2 (Two) Times a Day., Disp: , Rfl: 0  •  cyclobenzaprine (FLEXERIL) 10 MG tablet, take 1 tablet by mouth every evening, Disp: 90 tablet, Rfl: 1  •  DULoxetine (CYMBALTA) 60 MG capsule, Take 60 mg by mouth Daily., Disp: , Rfl:   •  FLUoxetine (PROzac) 20 MG capsule, Take 1 capsule by mouth Daily., Disp: , Rfl: 0  •  FLUoxetine (PROzac) 40 MG capsule, Take 40 mg by mouth Daily., Disp: , Rfl:   •  gabapentin (NEURONTIN) 600 MG tablet, Take 1 tablet by mouth 4 (Four) Times a Day., Disp: , Rfl: 0  •  glucose blood (NADEEM CONTOUR NEXT TEST) test strip, 1 strip daily., Disp: , Rfl:   •  hydrochlorothiazide (MICROZIDE) 12.5 MG capsule, take 1 capsule by mouth every morning, Disp: 90 capsule, Rfl: 1  •  HYDROcodone-acetaminophen (NORCO) 5-325 MG per tablet, Take 1 tablet by mouth Every 8 (Eight) Hours., Disp: , Rfl: 0  •  hydrocortisone (ANUSOL-HC) 2.5 % rectal cream, Insert  into the rectum 2 (Two) Times a Day., Disp: 30 g, Rfl: 3  •  ipratropium-albuterol (COMBIVENT RESPIMAT)  MCG/ACT inhaler, Inhale 1 puff 4 (Four) Times a Day., Disp: 12 g, Rfl: 3  •  ipratropium-albuterol (DUONEB) 0.5-2.5 mg/3 ml nebulizer, Take 3 mL by nebulization Every 6 (Six) Hours., Disp: 120 vial, Rfl: 3  •  Lancets  (FREESTYLE) lancets, FOR ONCE DAILY TESTING, Disp: 100 each, Rfl: 3  •  levothyroxine (SYNTHROID, LEVOTHROID) 88 MCG tablet, Take 1 tablet by mouth Daily., Disp: 90 tablet, Rfl: 1  •  linagliptin (TRADJENTA) 5 MG tablet tablet, Take 1 tablet by mouth Daily., Disp: 90 tablet, Rfl: 1  •  lisinopril (PRINIVIL,ZESTRIL) 5 MG tablet, Take 1 tablet by mouth Daily., Disp: 90 tablet, Rfl: 1  •  lithium 600 MG capsule, Take 1 capsule by mouth 3 (Three) Times a Day., Disp: , Rfl: 0  •  meloxicam (MOBIC) 15 MG tablet, take 1 tablet by mouth once daily with food, Disp: 90 tablet, Rfl: 0  •  metFORMIN (GLUCOPHAGE) 1000 MG tablet, Take 1.5 tabs AM and 1 tab PM by mouth., Disp: 225 tablet, Rfl: 1  •  metoprolol succinate XL (TOPROL-XL) 50 MG 24 hr tablet, Take 1 tablet by mouth Daily., Disp: 90 tablet, Rfl: 1  •  nitroglycerin (NITROSTAT) 0.4 MG SL tablet, Place 1 tablet under the tongue Every 5 (Five) Minutes As Needed for Chest Pain. Only take up to 3 tablets. Call 911 if pain persists., Disp: 30 tablet, Rfl: 0  •  nystatin (MYCOSTATIN) 275976 UNIT/GM cream, Apply  topically 2 (Two) Times a Day., Disp: 15 g, Rfl: 0  •  pantoprazole (PROTONIX) 40 MG EC tablet, Take 1 tablet by mouth Daily., Disp: 90 tablet, Rfl: 1  •  pioglitazone (ACTOS) 15 MG tablet, Take 1 tablet by mouth Daily., Disp: 90 tablet, Rfl: 1  •  QUEtiapine (SEROquel) 400 MG tablet, Take 2 tablets by mouth every night., Disp: , Rfl:   •  tamsulosin (FLOMAX) 0.4 MG capsule 24 hr capsule, take 2 capsules by mouth at bedtime, Disp: 180 capsule, Rfl: 1  •  topiramate (TOPAMAX) 100 MG tablet, Take 1 tablet by mouth Every Night., Disp: 90 tablet, Rfl: 1  •  traZODone (DESYREL) 100 MG tablet, Take 2 tablets by mouth Every Night., Disp: , Rfl: 0  •  vitamin D (ERGOCALCIFEROL) 43539 units capsule capsule, Take 1 capsule by mouth 1 (One) Time Per Week., Disp: 4 capsule, Rfl: 5  •  cycloSPORINE (RESTASIS) 0.05 % ophthalmic emulsion, Administer 1 drop to both eyes Every Night.,  "Disp: 5.5 mL, Rfl: 5      The following portions of the patient's history were reviewed and updated as appropriate: allergies, current medications, past family history, past medical history, past social history, past surgical history and problem list.    Review of Systems   Constitutional: Negative.  Negative for chills and fever.   HENT: Negative for ear discharge, ear pain, sinus pressure and sore throat.    Eyes: Positive for photophobia, pain and redness.   Respiratory: Negative for cough, chest tightness and shortness of breath.    Cardiovascular: Negative for chest pain, palpitations and leg swelling.   Gastrointestinal: Negative for diarrhea, nausea and vomiting.   Musculoskeletal: Positive for arthralgias. Negative for back pain and myalgias.   Neurological: Negative for dizziness, syncope and headaches.   Psychiatric/Behavioral: Negative for confusion and sleep disturbance.       Objective   /78   Pulse 74   Resp 20   Ht 185.4 cm (73\")   Wt 117 kg (259 lb)   SpO2 95%   BMI 34.17 kg/m²   Physical Exam   Constitutional: He is oriented to person, place, and time. He appears well-developed and well-nourished.   HENT:   Head: Normocephalic and atraumatic.   Right Ear: External ear normal.   Left Ear: External ear normal.   Mouth/Throat: No oropharyngeal exudate.   Eyes: Pupils are equal, round, and reactive to light. Right conjunctiva is injected. Left conjunctiva is injected.   Neck: Neck supple. No thyromegaly present.   Cardiovascular: Normal rate, regular rhythm and intact distal pulses.   Pulmonary/Chest: Effort normal and breath sounds normal.   Abdominal: Soft. Bowel sounds are normal. He exhibits no distension. There is no tenderness.   Musculoskeletal: He exhibits no edema.   Neurological: He is alert and oriented to person, place, and time. No cranial nerve deficit.   Skin: Skin is warm and dry.   Psychiatric: He has a normal mood and affect. Judgment normal.   Nursing note and vitals " reviewed.        Results for orders placed or performed in visit on 04/16/19   Comprehensive Metabolic Panel   Result Value Ref Range    Glucose 116 (H) 65 - 99 mg/dL    BUN 12 8 - 23 mg/dL    Creatinine 1.31 (H) 0.76 - 1.27 mg/dL    Sodium 138 136 - 145 mmol/L    Potassium 4.6 3.5 - 5.2 mmol/L    Chloride 97 (L) 98 - 107 mmol/L    CO2 27.3 22.0 - 29.0 mmol/L    Calcium 10.0 8.6 - 10.5 mg/dL    Total Protein 7.4 6.0 - 8.5 g/dL    Albumin 4.10 3.50 - 5.20 g/dL    ALT (SGPT) 14 1 - 41 U/L    AST (SGOT) 14 1 - 40 U/L    Alkaline Phosphatase 73 39 - 117 U/L    Total Bilirubin 0.3 0.2 - 1.2 mg/dL    eGFR Non African Amer 56 (L) >60 mL/min/1.73    Globulin 3.3 gm/dL    A/G Ratio 1.2 g/dL    BUN/Creatinine Ratio 9.2 7.0 - 25.0    Anion Gap 13.7 mmol/L   Lipid Panel   Result Value Ref Range    Total Cholesterol 142 0 - 200 mg/dL    Triglycerides 346 (H) 0 - 150 mg/dL    HDL Cholesterol 24 (L) 40 - 60 mg/dL    LDL Cholesterol  49 0 - 100 mg/dL    VLDL Cholesterol 69.2 (H) 5 - 40 mg/dL    LDL/HDL Ratio 2.03    TSH   Result Value Ref Range    TSH 2.880 0.270 - 4.200 mIU/mL   POC Glycosylated Hemoglobin (Hb A1C)   Result Value Ref Range    Hemoglobin A1C 7.1 %   POCT Glucose   Result Value Ref Range    Glucose 128 70 - 130 mg/dL             Assessment/Plan   Diagnoses and all orders for this visit:    Type 2 diabetes mellitus with other specified complication, without long-term current use of insulin (CMS/[facility])  -     POC Glycosylated Hemoglobin (Hb A1C)  -     POCT Glucose  -     linagliptin (TRADJENTA) 5 MG tablet tablet; Take 1 tablet by mouth Daily.  -     metFORMIN (GLUCOPHAGE) 1000 MG tablet; Take 1.5 tabs AM and 1 tab PM by mouth.    Hypertension, unspecified type  -     lisinopril (PRINIVIL,ZESTRIL) 5 MG tablet; Take 1 tablet by mouth Daily.    Acquired hypothyroidism  -     levothyroxine (SYNTHROID, LEVOTHROID) 88 MCG tablet; Take 1 tablet by mouth Daily.  -     TSH    Essential hypertension  -     Comprehensive  Metabolic Panel    Mixed hyperlipidemia  -     Comprehensive Metabolic Panel  -     Lipid Panel    Chronic dryness of both eyes  -     cycloSPORINE (RESTASIS) 0.05 % ophthalmic emulsion; Administer 1 drop to both eyes Every Night.    Other orders  -     metoprolol succinate XL (TOPROL-XL) 50 MG 24 hr tablet; Take 1 tablet by mouth Daily.           Return in about 6 months (around 10/16/2019) for Medicare Wellness.

## 2019-04-17 LAB
ALBUMIN SERPL-MCNC: 4.1 G/DL (ref 3.5–5.2)
ALBUMIN/GLOB SERPL: 1.2 G/DL
ALP SERPL-CCNC: 73 U/L (ref 39–117)
ALT SERPL W P-5'-P-CCNC: 14 U/L (ref 1–41)
ANION GAP SERPL CALCULATED.3IONS-SCNC: 13.7 MMOL/L
AST SERPL-CCNC: 14 U/L (ref 1–40)
BILIRUB SERPL-MCNC: 0.3 MG/DL (ref 0.2–1.2)
BUN BLD-MCNC: 12 MG/DL (ref 8–23)
BUN/CREAT SERPL: 9.2 (ref 7–25)
CALCIUM SPEC-SCNC: 10 MG/DL (ref 8.6–10.5)
CHLORIDE SERPL-SCNC: 97 MMOL/L (ref 98–107)
CHOLEST SERPL-MCNC: 142 MG/DL (ref 0–200)
CO2 SERPL-SCNC: 27.3 MMOL/L (ref 22–29)
CREAT BLD-MCNC: 1.31 MG/DL (ref 0.76–1.27)
GFR SERPL CREATININE-BSD FRML MDRD: 56 ML/MIN/1.73
GLOBULIN UR ELPH-MCNC: 3.3 GM/DL
GLUCOSE BLD-MCNC: 116 MG/DL (ref 65–99)
HDLC SERPL-MCNC: 24 MG/DL (ref 40–60)
LDLC SERPL CALC-MCNC: 49 MG/DL (ref 0–100)
LDLC/HDLC SERPL: 2.03 {RATIO}
POTASSIUM BLD-SCNC: 4.6 MMOL/L (ref 3.5–5.2)
PROT SERPL-MCNC: 7.4 G/DL (ref 6–8.5)
SODIUM BLD-SCNC: 138 MMOL/L (ref 136–145)
TRIGL SERPL-MCNC: 346 MG/DL (ref 0–150)
TSH SERPL DL<=0.05 MIU/L-ACNC: 2.88 MIU/ML (ref 0.27–4.2)
VLDLC SERPL-MCNC: 69.2 MG/DL (ref 5–40)

## 2019-05-01 ENCOUNTER — TELEPHONE (OUTPATIENT)
Dept: INTERNAL MEDICINE | Facility: CLINIC | Age: 59
End: 2019-05-01

## 2019-05-01 NOTE — TELEPHONE ENCOUNTER
Okay to change to individual rx.   If any further issues with the Rx, he needs to contact his eye

## 2019-05-01 NOTE — TELEPHONE ENCOUNTER
PHARM STATES THAT THE PATIENTS RESTASIS IN THE BOTTLE MEDICATION IS ON BACK ORDER AND WOULD LIKE TO KNOW IF THEY COULD PRESCRIBE THE INDIVIDUAL ORDER INSTEAD? PLEASE GIVE THE PHARM A CALL BACK -[phone]

## 2019-05-24 DIAGNOSIS — E11.69 TYPE 2 DIABETES MELLITUS WITH OTHER SPECIFIED COMPLICATION, WITHOUT LONG-TERM CURRENT USE OF INSULIN (HCC): ICD-10-CM

## 2019-05-31 DIAGNOSIS — E11.69 TYPE 2 DIABETES MELLITUS WITH OTHER SPECIFIED COMPLICATION, WITHOUT LONG-TERM CURRENT USE OF INSULIN (HCC): ICD-10-CM

## 2019-05-31 RX ORDER — PIOGLITAZONEHYDROCHLORIDE 15 MG/1
TABLET ORAL
Qty: 90 TABLET | Refills: 1 | Status: SHIPPED | OUTPATIENT
Start: 2019-05-31 | End: 2019-10-23

## 2019-06-17 RX ORDER — PANTOPRAZOLE SODIUM 40 MG/1
TABLET, DELAYED RELEASE ORAL
Qty: 90 TABLET | Refills: 1 | Status: SHIPPED | OUTPATIENT
Start: 2019-06-17 | End: 2020-01-31

## 2019-07-31 DIAGNOSIS — E78.2 MIXED HYPERLIPIDEMIA: ICD-10-CM

## 2019-07-31 RX ORDER — ATORVASTATIN CALCIUM 20 MG/1
TABLET, FILM COATED ORAL
Qty: 90 TABLET | Refills: 1 | Status: SHIPPED | OUTPATIENT
Start: 2019-07-31 | End: 2020-02-22

## 2019-08-14 DIAGNOSIS — J44.1 CHRONIC OBSTRUCTIVE PULMONARY DISEASE WITH ACUTE EXACERBATION (HCC): ICD-10-CM

## 2019-08-14 RX ORDER — IPRATROPIUM/ALBUTEROL SULFATE 20-100 MCG
MIST INHALER (GRAM) INHALATION
Qty: 12 G | Refills: 3 | Status: SHIPPED | OUTPATIENT
Start: 2019-08-14 | End: 2020-02-11 | Stop reason: SDUPTHER

## 2019-08-19 DIAGNOSIS — I10 ESSENTIAL HYPERTENSION: ICD-10-CM

## 2019-08-19 RX ORDER — HYDROCHLOROTHIAZIDE 12.5 MG/1
CAPSULE, GELATIN COATED ORAL
Qty: 90 CAPSULE | Refills: 1 | Status: SHIPPED | OUTPATIENT
Start: 2019-08-19 | End: 2020-08-22 | Stop reason: SDUPTHER

## 2019-10-15 ENCOUNTER — TELEPHONE (OUTPATIENT)
Dept: INTERNAL MEDICINE | Facility: CLINIC | Age: 59
End: 2019-10-15

## 2019-10-15 NOTE — TELEPHONE ENCOUNTER
HOSPITAL FU DISCHARGED 10/16/2019 [facility]  DX ENCETHALOPATHY TAYLOR    APPT SCHEDULED FOR 10/23/2019 WITH DR MINOR

## 2019-10-17 ENCOUNTER — TRANSITIONAL CARE MANAGEMENT TELEPHONE ENCOUNTER (OUTPATIENT)
Dept: INTERNAL MEDICINE | Facility: CLINIC | Age: 59
End: 2019-10-17

## 2019-10-17 NOTE — OUTREACH NOTE
"Pt gave verbal ok to speak w/ his brother Jaun. Pt doing ok. Denies fever, chills, n/v. Appetite getting better. Bladder & bowels moving, loose stools, on lactulose. Breathing is \"good.\" Pt up and ambulating in home. Jaun states DM meds discontinued at [place]. Encouraged pt monitor glucose at home, keep log to bring to PCP appt, and call PCP in interim to report elevated glucose. PCP appt 10/23/19.    Jaun has prepared pt's med box. He states Metoprolol XL 50mg qd is on [facility] Med List (still awaiting records) however he can not find med bottle.  Pt needs refill.  Also, can not find Flomax 0.4mg take two capsules bedtime med bottle. Insurance will not authorize refill until 11/2/19.  [facility] (on chart) states pt can get 16 day supply approx $26 out of pocket cost but will need Rx from Dr. Tello.    Can you please f/u on meds requested and notify patient brother Jaun [phone]?      "

## 2019-10-18 ENCOUNTER — TELEPHONE (OUTPATIENT)
Dept: INTERNAL MEDICINE | Facility: CLINIC | Age: 59
End: 2019-10-18

## 2019-10-18 RX ORDER — METOPROLOL SUCCINATE 50 MG/1
50 TABLET, EXTENDED RELEASE ORAL DAILY
Qty: 90 TABLET | Refills: 0 | Status: SHIPPED | OUTPATIENT
Start: 2019-10-18 | End: 2020-01-31

## 2019-10-18 RX ORDER — TAMSULOSIN HYDROCHLORIDE 0.4 MG/1
2 CAPSULE ORAL NIGHTLY
Qty: 60 CAPSULE | Refills: 2 | Status: SHIPPED | OUTPATIENT
Start: 2019-10-18 | End: 2019-10-23 | Stop reason: SDUPTHER

## 2019-10-18 NOTE — OUTREACH NOTE
[facility] Discharge Summary scanned.  Med list pg 4 of DC summary. Metoprolol succinate 50mg XL one tab qd and Flomax 0.4mg capsule BID listed as active meds.  Thank you.

## 2019-10-18 NOTE — OUTREACH NOTE
[facility] DC summary & H&P obtained / scanned.  2nd request for DC records urgent priority re-faxed to [facility].

## 2019-10-18 NOTE — OUTREACH NOTE
No records except for a ER note from [facility]. DOn't have either of these meds on our medlist either.    Please get the discharge summary and will send in meds per that.    thanks

## 2019-10-18 NOTE — TELEPHONE ENCOUNTER
GENET WITH [facility] IS CALLING STATING THEY RECEIVED ORDERS YESTERDAY TO START HOME HEALTH FOR NURSING, OT, PT, AND . THEY STARTED THE HOME HEALTH VISIT TODAY.

## 2019-10-23 ENCOUNTER — OFFICE VISIT (OUTPATIENT)
Dept: INTERNAL MEDICINE | Facility: CLINIC | Age: 59
End: 2019-10-23

## 2019-10-23 ENCOUNTER — TELEPHONE (OUTPATIENT)
Dept: INTERNAL MEDICINE | Facility: CLINIC | Age: 59
End: 2019-10-23

## 2019-10-23 VITALS
HEIGHT: 73 IN | HEART RATE: 72 BPM | DIASTOLIC BLOOD PRESSURE: 60 MMHG | BODY MASS INDEX: 33.27 KG/M2 | WEIGHT: 251 LBS | OXYGEN SATURATION: 99 % | SYSTOLIC BLOOD PRESSURE: 120 MMHG

## 2019-10-23 DIAGNOSIS — R21 RASH: ICD-10-CM

## 2019-10-23 DIAGNOSIS — N41.1 CHRONIC PROSTATITIS: ICD-10-CM

## 2019-10-23 DIAGNOSIS — N17.9 AKI (ACUTE KIDNEY INJURY) (HCC): ICD-10-CM

## 2019-10-23 DIAGNOSIS — E11.69 TYPE 2 DIABETES MELLITUS WITH OTHER SPECIFIED COMPLICATION, WITHOUT LONG-TERM CURRENT USE OF INSULIN (HCC): Primary | ICD-10-CM

## 2019-10-23 DIAGNOSIS — G43.909 MIGRAINE WITHOUT STATUS MIGRAINOSUS, NOT INTRACTABLE, UNSPECIFIED MIGRAINE TYPE: ICD-10-CM

## 2019-10-23 LAB — HBA1C MFR BLD: 6.3 %

## 2019-10-23 PROCEDURE — 85027 COMPLETE CBC AUTOMATED: CPT | Performed by: INTERNAL MEDICINE

## 2019-10-23 PROCEDURE — 80048 BASIC METABOLIC PNL TOTAL CA: CPT | Performed by: INTERNAL MEDICINE

## 2019-10-23 PROCEDURE — 99495 TRANSJ CARE MGMT MOD F2F 14D: CPT | Performed by: INTERNAL MEDICINE

## 2019-10-23 PROCEDURE — 83036 HEMOGLOBIN GLYCOSYLATED A1C: CPT | Performed by: INTERNAL MEDICINE

## 2019-10-23 RX ORDER — LORATADINE 10 MG/1
10 TABLET ORAL DAILY
Qty: 30 TABLET | Refills: 5 | Status: SHIPPED | OUTPATIENT
Start: 2019-10-23 | End: 2020-06-23

## 2019-10-23 RX ORDER — TOPIRAMATE 50 MG/1
100 TABLET, FILM COATED ORAL NIGHTLY
Qty: 30 TABLET | Refills: 3 | Status: SHIPPED | OUTPATIENT
Start: 2019-10-23 | End: 2020-01-31

## 2019-10-23 RX ORDER — TAMSULOSIN HYDROCHLORIDE 0.4 MG/1
2 CAPSULE ORAL NIGHTLY
Qty: 30 CAPSULE | Refills: 0 | Status: SHIPPED | OUTPATIENT
Start: 2019-10-23 | End: 2020-01-31

## 2019-10-23 RX ORDER — TAMSULOSIN HYDROCHLORIDE 0.4 MG/1
2 CAPSULE ORAL NIGHTLY
Qty: 30 CAPSULE | Refills: 0 | Status: SHIPPED | OUTPATIENT
Start: 2019-10-23 | End: 2019-10-23 | Stop reason: SDUPTHER

## 2019-10-23 NOTE — TELEPHONE ENCOUNTER
CARLOS WITH [facility] IS NEEDING VERBAL ORDER FOR SPEECH THERAPY 3X WEEKLY FOR 3 WEEKS, THEN 1X WEEKLY FOR 1 WEEK. CARLOS CAN BE REACHED [phone]

## 2019-10-24 LAB
ANION GAP SERPL CALCULATED.3IONS-SCNC: 14.7 MMOL/L (ref 5–15)
BUN BLD-MCNC: 6 MG/DL (ref 8–23)
BUN/CREAT SERPL: 3.6 (ref 7–25)
CALCIUM SPEC-SCNC: 9.9 MG/DL (ref 8.6–10.5)
CHLORIDE SERPL-SCNC: 104 MMOL/L (ref 98–107)
CO2 SERPL-SCNC: 22.3 MMOL/L (ref 22–29)
CREAT BLD-MCNC: 1.67 MG/DL (ref 0.76–1.27)
DEPRECATED RDW RBC AUTO: 44.4 FL (ref 37–54)
ERYTHROCYTE [DISTWIDTH] IN BLOOD BY AUTOMATED COUNT: 12.9 % (ref 12.3–15.4)
GFR SERPL CREATININE-BSD FRML MDRD: 42 ML/MIN/1.73
GLUCOSE BLD-MCNC: 115 MG/DL (ref 65–99)
HCT VFR BLD AUTO: 41.7 % (ref 37.5–51)
HGB BLD-MCNC: 14.2 G/DL (ref 13–17.7)
MCH RBC QN AUTO: 32 PG (ref 26.6–33)
MCHC RBC AUTO-ENTMCNC: 34.1 G/DL (ref 31.5–35.7)
MCV RBC AUTO: 93.9 FL (ref 79–97)
PLATELET # BLD AUTO: 234 10*3/MM3 (ref 140–450)
PMV BLD AUTO: 12.3 FL (ref 6–12)
POTASSIUM BLD-SCNC: 4.2 MMOL/L (ref 3.5–5.2)
RBC # BLD AUTO: 4.44 10*6/MM3 (ref 4.14–5.8)
SODIUM BLD-SCNC: 141 MMOL/L (ref 136–145)
WBC NRBC COR # BLD: 8.58 10*3/MM3 (ref 3.4–10.8)

## 2019-11-06 ENCOUNTER — TELEPHONE (OUTPATIENT)
Dept: INTERNAL MEDICINE | Facility: CLINIC | Age: 59
End: 2019-11-06

## 2019-11-06 NOTE — TELEPHONE ENCOUNTER
GENET FROM [facility] LEFT VM AT 9:42 AM AND IS REQUESTING A CALL BACK FROM NURSE TO GIVE REPORT ON HIM. PLEASE ADVISE. GENET CAN BE REACHED [phone]

## 2019-11-20 ENCOUNTER — TELEPHONE (OUTPATIENT)
Dept: INTERNAL MEDICINE | Facility: CLINIC | Age: 59
End: 2019-11-20

## 2019-11-20 DIAGNOSIS — E11.69 TYPE 2 DIABETES MELLITUS WITH OTHER SPECIFIED COMPLICATION, WITHOUT LONG-TERM CURRENT USE OF INSULIN (HCC): ICD-10-CM

## 2019-11-20 NOTE — TELEPHONE ENCOUNTER
Brother reports increase in bg readings, brother would like him to restart metformin. Also, he would like to know the results of recent lab work. Mr Darling is present with his brother at this time.     Call back [phone] ashok darling (brother)

## 2019-11-20 NOTE — TELEPHONE ENCOUNTER
Pt states that his BS's has been running around 160-180 for about the past 3 weeks.  Should he restart metformin?

## 2019-11-21 NOTE — TELEPHONE ENCOUNTER
Please let Jaun know that his brother Carmelo) can restart the metformin, at a lower dose- should take 500mg twice daily.  His labs show normal blood counts but kidney function is a little worse from before.  He should increase his fluids to 80- 100 oz daily.    I've sent in an Rx for the metformin 500 bid.    thanks

## 2019-11-25 ENCOUNTER — TELEPHONE (OUTPATIENT)
Dept: INTERNAL MEDICINE | Facility: CLINIC | Age: 59
End: 2019-11-25

## 2019-11-27 ENCOUNTER — TELEPHONE (OUTPATIENT)
Dept: INTERNAL MEDICINE | Facility: CLINIC | Age: 59
End: 2019-11-27

## 2019-12-02 ENCOUNTER — TELEPHONE (OUTPATIENT)
Dept: INTERNAL MEDICINE | Facility: CLINIC | Age: 59
End: 2019-12-02

## 2019-12-02 NOTE — TELEPHONE ENCOUNTER
PATIENT STATES HIM AND HIS WIFE HAVE YEAST INFECTION. HE IS CALLING WANTING TO SEE IF DR. MINOR WOULD CALL IN YEAST INFECTION MEDICATION TO HELP TREAT. HES TRIED A CREAM WHICH IS NOT HELPING. PATIENTS NUMBER [phone]

## 2019-12-03 RX ORDER — FLUCONAZOLE 100 MG/1
100 TABLET ORAL DAILY
Qty: 5 TABLET | Refills: 0 | Status: SHIPPED | OUTPATIENT
Start: 2019-12-03 | End: 2019-12-09

## 2019-12-09 ENCOUNTER — OFFICE VISIT (OUTPATIENT)
Dept: INTERNAL MEDICINE | Facility: CLINIC | Age: 59
End: 2019-12-09

## 2019-12-09 VITALS
HEART RATE: 71 BPM | DIASTOLIC BLOOD PRESSURE: 84 MMHG | BODY MASS INDEX: 30.56 KG/M2 | SYSTOLIC BLOOD PRESSURE: 118 MMHG | WEIGHT: 230.6 LBS | HEIGHT: 73 IN | OXYGEN SATURATION: 98 %

## 2019-12-09 DIAGNOSIS — N17.9 AKI (ACUTE KIDNEY INJURY) (HCC): ICD-10-CM

## 2019-12-09 DIAGNOSIS — R41.3 MEMORY LOSS: ICD-10-CM

## 2019-12-09 DIAGNOSIS — N48.1 BALANITIS: ICD-10-CM

## 2019-12-09 DIAGNOSIS — E11.49 OTHER DIABETIC NEUROLOGICAL COMPLICATION ASSOCIATED WITH TYPE 2 DIABETES MELLITUS (HCC): ICD-10-CM

## 2019-12-09 DIAGNOSIS — E11.69 TYPE 2 DIABETES MELLITUS WITH OTHER SPECIFIED COMPLICATION, WITHOUT LONG-TERM CURRENT USE OF INSULIN (HCC): Primary | ICD-10-CM

## 2019-12-09 LAB
ANION GAP SERPL CALCULATED.3IONS-SCNC: 13.8 MMOL/L (ref 5–15)
BUN BLD-MCNC: 12 MG/DL (ref 8–23)
BUN/CREAT SERPL: 6.3 (ref 7–25)
CALCIUM SPEC-SCNC: 9 MG/DL (ref 8.6–10.5)
CHLORIDE SERPL-SCNC: 100 MMOL/L (ref 98–107)
CO2 SERPL-SCNC: 23.2 MMOL/L (ref 22–29)
CREAT BLD-MCNC: 1.92 MG/DL (ref 0.76–1.27)
GFR SERPL CREATININE-BSD FRML MDRD: 36 ML/MIN/1.73
GLUCOSE BLD-MCNC: 122 MG/DL (ref 65–99)
GLUCOSE BLDC GLUCOMTR-MCNC: 139 MG/DL (ref 70–130)
HBA1C MFR BLD: 7 %
POTASSIUM BLD-SCNC: 3.6 MMOL/L (ref 3.5–5.2)
SODIUM BLD-SCNC: 137 MMOL/L (ref 136–145)

## 2019-12-09 PROCEDURE — 82947 ASSAY GLUCOSE BLOOD QUANT: CPT | Performed by: INTERNAL MEDICINE

## 2019-12-09 PROCEDURE — 80048 BASIC METABOLIC PNL TOTAL CA: CPT | Performed by: INTERNAL MEDICINE

## 2019-12-09 PROCEDURE — 99214 OFFICE O/P EST MOD 30 MIN: CPT | Performed by: INTERNAL MEDICINE

## 2019-12-09 PROCEDURE — 83036 HEMOGLOBIN GLYCOSYLATED A1C: CPT | Performed by: INTERNAL MEDICINE

## 2019-12-09 RX ORDER — HYDROCODONE BITARTRATE AND ACETAMINOPHEN 7.5; 325 MG/1; MG/1
1 TABLET ORAL 3 TIMES DAILY
Refills: 0 | COMMUNITY
Start: 2019-11-30

## 2019-12-09 NOTE — PROGRESS NOTES
Diabetes (would like a new meter) and Memory Loss    Subjective   Carmelo Saucedo is a 59 y.o. male is here today for follow-up.    History of Present Illness   Carmelo is here for a f/u on his DM2, and memory issues. Needs a new rx for glucometer.  Getting home health and PT.memory is slowly getting better.  Unsure if he got his results from last labs.    Current Outpatient Medications:   •  acyclovir (ZOVIRAX) 400 MG tablet, take 1 tablet by mouth twice a day, Disp: 60 tablet, Rfl: 5  •  aspirin 81 MG chewable tablet, Chew 81 mg Daily., Disp: , Rfl:   •  atorvastatin (LIPITOR) 20 MG tablet, take 1 tablet by mouth every evening, Disp: 90 tablet, Rfl: 1  •  Cholecalciferol (VITAMIN D3) 5000 UNITS capsule capsule, Take 1 capsule by mouth daily., Disp: 30 capsule, Rfl: 5  •  clonazePAM (KlonoPIN) 1 MG tablet, Take 1 tablet by mouth 2 (Two) Times a Day., Disp: , Rfl: 0  •  COMBIVENT RESPIMAT  MCG/ACT inhaler, inhale 1 puff by mouth four times a day, Disp: 12 g, Rfl: 3  •  cyclobenzaprine (FLEXERIL) 10 MG tablet, take 1 tablet by mouth every evening, Disp: 90 tablet, Rfl: 1  •  cycloSPORINE (RESTASIS) 0.05 % ophthalmic emulsion, Administer 1 drop to both eyes Every Night., Disp: 5.5 mL, Rfl: 5  •  DULoxetine (CYMBALTA) 60 MG capsule, Take 60 mg by mouth Daily., Disp: , Rfl:   •  FLUoxetine (PROzac) 20 MG capsule, Take 1 capsule by mouth Daily., Disp: , Rfl: 0  •  FLUoxetine (PROzac) 40 MG capsule, Take 40 mg by mouth Daily., Disp: , Rfl:   •  hydrochlorothiazide (MICROZIDE) 12.5 MG capsule, take 1 capsule by mouth every morning, Disp: 90 capsule, Rfl: 1  •  HYDROcodone-acetaminophen (NORCO) 7.5-325 MG per tablet, Take 1 tablet by mouth 3 (Three) Times a Day., Disp: , Rfl: 0  •  hydrocortisone (ANUSOL-HC) 2.5 % rectal cream, Insert  into the rectum 2 (Two) Times a Day., Disp: 30 g, Rfl: 3  •  ipratropium-albuterol (DUONEB) 0.5-2.5 mg/3 ml nebulizer, Take 3 mL by nebulization Every 6 (Six) Hours., Disp: 120 vial, Rfl: 3  •   Lancets (FREESTYLE) lancets, FOR ONCE DAILY TESTING, Disp: 100 each, Rfl: 3  •  levothyroxine (SYNTHROID, LEVOTHROID) 88 MCG tablet, Take 1 tablet by mouth Daily., Disp: 90 tablet, Rfl: 1  •  lisinopril (PRINIVIL,ZESTRIL) 5 MG tablet, Take 1 tablet by mouth Daily., Disp: 90 tablet, Rfl: 1  •  loratadine (CLARITIN) 10 MG tablet, Take 1 tablet by mouth Daily., Disp: 30 tablet, Rfl: 5  •  meloxicam (MOBIC) 15 MG tablet, take 1 tablet by mouth once daily with food, Disp: 90 tablet, Rfl: 0  •  metFORMIN (GLUCOPHAGE) 1000 MG tablet, Take 0.5 tablets by mouth 2 (Two) Times a Day With Meals., Disp: 60 tablet, Rfl: 5  •  metoprolol succinate XL (TOPROL XL) 50 MG 24 hr tablet, Take 1 tablet by mouth Daily., Disp: 90 tablet, Rfl: 0  •  nitroglycerin (NITROSTAT) 0.4 MG SL tablet, Place 1 tablet under the tongue Every 5 (Five) Minutes As Needed for Chest Pain. Only take up to 3 tablets. Call 911 if pain persists., Disp: 30 tablet, Rfl: 0  •  nystatin (MYCOSTATIN) 125927 UNIT/GM cream, Apply  topically 2 (Two) Times a Day., Disp: 15 g, Rfl: 0  •  pantoprazole (PROTONIX) 40 MG EC tablet, take 1 tablet by mouth once daily, Disp: 90 tablet, Rfl: 1  •  QUEtiapine (SEROquel) 400 MG tablet, Take 2 tablets by mouth every night., Disp: , Rfl:   •  tamsulosin (FLOMAX) 0.4 MG capsule 24 hr capsule, Take 2 capsules by mouth Every Night., Disp: 30 capsule, Rfl: 0  •  topiramate (TOPAMAX) 50 MG tablet, Take 2 tablets by mouth Every Night., Disp: 30 tablet, Rfl: 3  •  traZODone (DESYREL) 100 MG tablet, Take 2 tablets by mouth Every Night., Disp: , Rfl: 0  •  vitamin D (ERGOCALCIFEROL) 87832 units capsule capsule, Take 1 capsule by mouth 1 (One) Time Per Week., Disp: 4 capsule, Rfl: 5  •  glucose blood (ONETOUCH VERIO) test strip, 1 each by Other route 2 (Two) Times a Day. Use as instructed, Disp: 60 each, Rfl: 12  •  linagliptin (TRADJENTA) 5 MG tablet tablet, Take 1 tablet by mouth Daily., Disp: 30 tablet, Rfl: 5      The following portions  "of the patient's history were reviewed and updated as appropriate: allergies, current medications, past family history, past medical history, past social history, past surgical history and problem list.    Review of Systems   Constitutional: Negative.  Negative for chills and fever.   HENT: Negative for ear discharge, ear pain, sinus pressure and sore throat.    Respiratory: Negative for cough, chest tightness and shortness of breath.    Cardiovascular: Negative for chest pain, palpitations and leg swelling.   Gastrointestinal: Negative for diarrhea, nausea and vomiting.   Musculoskeletal: Negative for arthralgias, back pain and myalgias.   Neurological: Negative for dizziness, syncope and headaches.   Psychiatric/Behavioral: Positive for confusion. Negative for sleep disturbance.       Objective   /84   Pulse 71   Ht 185.4 cm (73\")   Wt 105 kg (230 lb 9.6 oz)   SpO2 98% Comment: ra  BMI 30.42 kg/m²   Physical Exam   Constitutional: He is oriented to person, place, and time. He appears well-developed and well-nourished.   HENT:   Head: Normocephalic and atraumatic.   Right Ear: External ear normal.   Left Ear: External ear normal.   Mouth/Throat: No oropharyngeal exudate.   Eyes: Pupils are equal, round, and reactive to light. Conjunctivae are normal.   Neck: Neck supple. No thyromegaly present.   Cardiovascular: Normal rate and regular rhythm.   Pulmonary/Chest: Effort normal and breath sounds normal.   Abdominal: Soft. Bowel sounds are normal. He exhibits no distension. There is no tenderness.   Musculoskeletal: He exhibits no edema.   Neurological: He is alert and oriented to person, place, and time. No cranial nerve deficit.   Skin: Skin is warm and dry.   Psychiatric: He has a normal mood and affect. Judgment normal.   Nursing note and vitals reviewed.        Results for orders placed or performed in visit on 12/09/19   Basic Metabolic Panel   Result Value Ref Range    Glucose 122 (H) 65 - 99 mg/dL "    BUN 12 8 - 23 mg/dL    Creatinine 1.92 (H) 0.76 - 1.27 mg/dL    Sodium 137 136 - 145 mmol/L    Potassium 3.6 3.5 - 5.2 mmol/L    Chloride 100 98 - 107 mmol/L    CO2 23.2 22.0 - 29.0 mmol/L    Calcium 9.0 8.6 - 10.5 mg/dL    eGFR Non African Amer 36 (L) >60 mL/min/1.73    BUN/Creatinine Ratio 6.3 (L) 7.0 - 25.0    Anion Gap 13.8 5.0 - 15.0 mmol/L   POCT Glucose   Result Value Ref Range    Glucose 139 (A) 70 - 130 mg/dL   POC Glycosylated Hemoglobin (Hb A1C)   Result Value Ref Range    Hemoglobin A1C 7.0 %             Assessment/Plan   Diagnoses and all orders for this visit:    Type 2 diabetes mellitus with other specified complication, without long-term current use of insulin (CMS/[facility])  Comments:  restart tradjenta, a1C higher.  Orders:  -     POCT Glucose  -     POC Glycosylated Hemoglobin (Hb A1C)  -     linagliptin (TRADJENTA) 5 MG tablet tablet; Take 1 tablet by mouth Daily.  -     glucose blood (ONETOUCH VERIO) test strip; 1 each by Other route 2 (Two) Times a Day. Use as instructed    Balanitis  Comments:  better.    Memory loss  Comments:  hold of gabapentin    Other diabetic neurological complication associated with type 2 diabetes mellitus (CMS/[facility])  Comments:  use capsaicin cream instead.    TAYLOR (acute kidney injury) (CMS/[facility])  -     Basic Metabolic Panel    Other orders  -     HYDROcodone-acetaminophen (NORCO) 7.5-325 MG per tablet; Take 1 tablet by mouth 3 (Three) Times a Day.    restart lyrica if neuropathy worse.    Unsure if he got a message about his labs last time. Adv. Was notified via phone. Reviewed again today.           Return in about 3 months (around 3/9/2020) for Next scheduled follow up.

## 2019-12-23 ENCOUNTER — TELEPHONE (OUTPATIENT)
Dept: INTERNAL MEDICINE | Facility: CLINIC | Age: 59
End: 2019-12-23

## 2019-12-23 RX ORDER — LANCETS 28 GAUGE
EACH MISCELLANEOUS
Qty: 100 EACH | Refills: 3 | OUTPATIENT
Start: 2019-12-23 | End: 2021-07-09

## 2019-12-23 NOTE — TELEPHONE ENCOUNTER
Pt stated his medicare will not cover onetouch verio strips. Pt stated he put a battery in the freestyle meter and its working and he would like to order strips for that because his insurance covered that.

## 2019-12-28 DIAGNOSIS — B00.9 HSV-2 INFECTION: ICD-10-CM

## 2019-12-28 RX ORDER — ACYCLOVIR 400 MG/1
TABLET ORAL
Qty: 180 TABLET | Refills: 1 | Status: SHIPPED | OUTPATIENT
Start: 2019-12-28 | End: 2020-06-12

## 2020-01-03 ENCOUNTER — TELEPHONE (OUTPATIENT)
Dept: INTERNAL MEDICINE | Facility: CLINIC | Age: 60
End: 2020-01-03

## 2020-01-03 NOTE — TELEPHONE ENCOUNTER
[facility] called and requested to speak directly with Annika. I contacted the  and they informed me that Annika was in the new building and I needed to send a message.     Luz's callback- [phone]

## 2020-01-03 NOTE — TELEPHONE ENCOUNTER
Please call or fax Speech Therapy order to eval and treat with a diagnosis code to Isabel Sterling   Phone [phone]  Fax [phone]    [facility]

## 2020-01-06 ENCOUNTER — TELEPHONE (OUTPATIENT)
Dept: INTERNAL MEDICINE | Facility: CLINIC | Age: 60
End: 2020-01-06

## 2020-01-06 NOTE — TELEPHONE ENCOUNTER
Pt wants to know if his medication that he has been on for his kidneys (said they have shut down to a medication he was put on but wasn't sure what the name of the medication was ) ; he has had diarrhea for the past week, and so far all day today; Please advise and call pt    [phone]

## 2020-01-07 ENCOUNTER — TELEPHONE (OUTPATIENT)
Dept: INTERNAL MEDICINE | Facility: CLINIC | Age: 60
End: 2020-01-07

## 2020-01-07 NOTE — TELEPHONE ENCOUNTER
Is he referring to the metformin ? It should not make his kidneys shut down, but since his kidneys are worse. He should stop it for now , until he is seen for his follow up.  Also he should stop his Mobic and all otc nsaids / pain meds. Can take tylenol as needed, in a limited quantity.    thanks

## 2020-01-07 NOTE — TELEPHONE ENCOUNTER
Patient would like someone to call him with lab results, said he never got a copy in the mail. And he has a couple clinical questions. Thank you

## 2020-01-08 ENCOUNTER — TELEPHONE (OUTPATIENT)
Dept: INTERNAL MEDICINE | Facility: CLINIC | Age: 60
End: 2020-01-08

## 2020-01-08 NOTE — TELEPHONE ENCOUNTER
Patient called stated that he recently had a doseage change on    FLUoxetine (PROzac)      He states that he has diariah every single night starting around 8pm and lasting until morning.  Pt stated that it is so bad that he has been waking up after it happens, he fears that he will soon become dehydrated, Please advise  @[phone]    He also wanted to know the results of his Kidney Functions Test, stated that he never received the results in the mail, verified address.  Please advise on this as well.

## 2020-01-08 NOTE — TELEPHONE ENCOUNTER
Pt was talking about his prozac that his psych gives to his.  States that since his dose has been increased he has had diarrhea.  I advised him that he needed to contact his psych office and let them know so they will be able to make adjustments to medications and or change as needed.  Pt states verbal understanding.

## 2020-01-14 ENCOUNTER — TELEPHONE (OUTPATIENT)
Dept: INTERNAL MEDICINE | Facility: CLINIC | Age: 60
End: 2020-01-14

## 2020-01-14 NOTE — TELEPHONE ENCOUNTER
Patient called stated that he still has the Diarrhea. Wanted to know if there was a way to have the lab send him a stool specimen collection kit.  Patient is worried that not taking the metformin, due to his bloodsugar being 180 this morning     Please advise  [phone]

## 2020-01-16 NOTE — TELEPHONE ENCOUNTER
If he has home health still coming , we could have them collect his stool specimen for labs.  Please check.  Re: metformin, he should take it once daily for 1 week and then go up to bid.    thanks

## 2020-01-31 DIAGNOSIS — G43.909 MIGRAINE WITHOUT STATUS MIGRAINOSUS, NOT INTRACTABLE, UNSPECIFIED MIGRAINE TYPE: ICD-10-CM

## 2020-01-31 DIAGNOSIS — N41.1 CHRONIC PROSTATITIS: ICD-10-CM

## 2020-01-31 RX ORDER — METOPROLOL SUCCINATE 50 MG/1
TABLET, EXTENDED RELEASE ORAL
Qty: 90 TABLET | Refills: 0 | Status: SHIPPED | OUTPATIENT
Start: 2020-01-31 | End: 2020-05-06 | Stop reason: SDUPTHER

## 2020-01-31 RX ORDER — TOPIRAMATE 50 MG/1
TABLET, FILM COATED ORAL
Qty: 180 TABLET | Refills: 0 | Status: SHIPPED | OUTPATIENT
Start: 2020-01-31 | End: 2020-05-06 | Stop reason: SDUPTHER

## 2020-01-31 RX ORDER — PANTOPRAZOLE SODIUM 40 MG/1
TABLET, DELAYED RELEASE ORAL
Qty: 90 TABLET | Refills: 0 | Status: SHIPPED | OUTPATIENT
Start: 2020-01-31 | End: 2020-05-06 | Stop reason: SDUPTHER

## 2020-01-31 RX ORDER — TAMSULOSIN HYDROCHLORIDE 0.4 MG/1
CAPSULE ORAL
Qty: 180 CAPSULE | Refills: 0 | Status: SHIPPED | OUTPATIENT
Start: 2020-01-31 | End: 2020-04-22 | Stop reason: SDUPTHER

## 2020-02-03 ENCOUNTER — TELEPHONE (OUTPATIENT)
Dept: INTERNAL MEDICINE | Facility: CLINIC | Age: 60
End: 2020-02-03

## 2020-02-04 NOTE — TELEPHONE ENCOUNTER
Wanted to know what to do about elevated level, advised to increase water to 80-100oz daily as discussed in results note call, pt states he has been.  Reminded him that we will recheck level at his fu next month, states verbal understanding.

## 2020-02-10 ENCOUNTER — TELEPHONE (OUTPATIENT)
Dept: INTERNAL MEDICINE | Facility: CLINIC | Age: 60
End: 2020-02-10

## 2020-02-10 DIAGNOSIS — J44.1 CHRONIC OBSTRUCTIVE PULMONARY DISEASE WITH ACUTE EXACERBATION (HCC): ICD-10-CM

## 2020-02-10 RX ORDER — IPRATROPIUM BROMIDE AND ALBUTEROL SULFATE 2.5; .5 MG/3ML; MG/3ML
3 SOLUTION RESPIRATORY (INHALATION) EVERY 6 HOURS
Qty: 120 VIAL | Refills: 3 | Status: SHIPPED | OUTPATIENT
Start: 2020-02-10

## 2020-02-10 NOTE — TELEPHONE ENCOUNTER
"Called pt and he voiced understanding,  He mentioned that he cant control his bladder and he does not make it to the bathroom in time.he states he drinks a lot water, and he is going to the kidney doctor 2/25/20 he wasn't sure if you knew, he started to talk about how he 'sees things that aren't there\" and for an example he states he sees his daughter outside talking to someone and his wife woke up and looked and she said there was nothing is out there. I ask him how he was using the bathroom, he states he does not pee a lot.   I advised an apt, and he states that he could, but that he just has no energy,   Please advise on what I need to do?    "

## 2020-02-10 NOTE — TELEPHONE ENCOUNTER
Patient was calling in to ask if Dr Tello would call in a refill for his ipratropium-albuterol (DUONEB) 0.5-2.5 mg/3 ml nebulizer patient advised he quit smoking about 6-7 mths ago and has found he is not using his nebulizer as much but he called the pharmacy over the weekend to get it refilled but he was wanting to see if he could get half the dose he usually does because he is not refilling it as often. Patient also advised he is only using it about once a day and hasn't been wheezing or anything.   Patient is still using the [facility] [address] [place] [address].   Patient also seems to have several questions about whether he should be taking his metformin or not he stated she told him to stop taking it but when he took his sugar this morning fasting it was 167. Patient can be reached at [phone]

## 2020-02-10 NOTE — TELEPHONE ENCOUNTER
rx sent for the duo nebs.  Stay off the metformin until follow up.  If sugars > 180 fasting to let us know.    thanks

## 2020-02-11 ENCOUNTER — APPOINTMENT (OUTPATIENT)
Dept: LAB | Facility: HOSPITAL | Age: 60
End: 2020-02-11

## 2020-02-11 ENCOUNTER — OFFICE VISIT (OUTPATIENT)
Dept: INTERNAL MEDICINE | Facility: CLINIC | Age: 60
End: 2020-02-11

## 2020-02-11 VITALS
HEIGHT: 73 IN | WEIGHT: 232.4 LBS | RESPIRATION RATE: 16 BRPM | BODY MASS INDEX: 30.8 KG/M2 | TEMPERATURE: 98.2 F | DIASTOLIC BLOOD PRESSURE: 94 MMHG | OXYGEN SATURATION: 97 % | HEART RATE: 69 BPM | SYSTOLIC BLOOD PRESSURE: 134 MMHG

## 2020-02-11 DIAGNOSIS — N40.1 BPH WITH OBSTRUCTION/LOWER URINARY TRACT SYMPTOMS: ICD-10-CM

## 2020-02-11 DIAGNOSIS — N17.9 AKI (ACUTE KIDNEY INJURY) (HCC): ICD-10-CM

## 2020-02-11 DIAGNOSIS — N13.8 BPH WITH OBSTRUCTION/LOWER URINARY TRACT SYMPTOMS: ICD-10-CM

## 2020-02-11 DIAGNOSIS — J44.1 CHRONIC OBSTRUCTIVE PULMONARY DISEASE WITH ACUTE EXACERBATION (HCC): ICD-10-CM

## 2020-02-11 DIAGNOSIS — N39.41 URGE INCONTINENCE: Primary | ICD-10-CM

## 2020-02-11 DIAGNOSIS — E11.69 TYPE 2 DIABETES MELLITUS WITH OTHER SPECIFIED COMPLICATION, WITHOUT LONG-TERM CURRENT USE OF INSULIN (HCC): ICD-10-CM

## 2020-02-11 LAB
ALBUMIN SERPL-MCNC: 4.3 G/DL (ref 3.5–5.2)
ALBUMIN/GLOB SERPL: 1.4 G/DL
ALP SERPL-CCNC: 87 U/L (ref 39–117)
ALT SERPL W P-5'-P-CCNC: 22 U/L (ref 1–41)
ANION GAP SERPL CALCULATED.3IONS-SCNC: 9.9 MMOL/L (ref 5–15)
AST SERPL-CCNC: 18 U/L (ref 1–40)
BILIRUB BLD-MCNC: NEGATIVE MG/DL
BILIRUB SERPL-MCNC: 0.3 MG/DL (ref 0.2–1.2)
BUN BLD-MCNC: 18 MG/DL (ref 8–23)
BUN/CREAT SERPL: 10.1 (ref 7–25)
CALCIUM SPEC-SCNC: 9.5 MG/DL (ref 8.6–10.5)
CHLORIDE SERPL-SCNC: 104 MMOL/L (ref 98–107)
CLARITY, POC: CLEAR
CO2 SERPL-SCNC: 23.1 MMOL/L (ref 22–29)
COLOR UR: YELLOW
CREAT BLD-MCNC: 1.79 MG/DL (ref 0.76–1.27)
GFR SERPL CREATININE-BSD FRML MDRD: 39 ML/MIN/1.73
GLOBULIN UR ELPH-MCNC: 3 GM/DL
GLUCOSE BLD-MCNC: 146 MG/DL (ref 65–99)
GLUCOSE UR STRIP-MCNC: NEGATIVE MG/DL
HBA1C MFR BLD: 7.1 % (ref 4.8–5.6)
KETONES UR QL: NEGATIVE
LEUKOCYTE EST, POC: NEGATIVE
NITRITE UR-MCNC: NEGATIVE MG/ML
PH UR: 6 [PH] (ref 5–8)
POTASSIUM BLD-SCNC: 4.3 MMOL/L (ref 3.5–5.2)
PROT SERPL-MCNC: 7.3 G/DL (ref 6–8.5)
PROT UR STRIP-MCNC: NEGATIVE MG/DL
PSA SERPL-MCNC: 1.31 NG/ML (ref 0–4)
RBC # UR STRIP: NEGATIVE /UL
SODIUM BLD-SCNC: 137 MMOL/L (ref 136–145)
SP GR UR: 1.01 (ref 1–1.03)
UROBILINOGEN UR QL: NORMAL

## 2020-02-11 PROCEDURE — 99214 OFFICE O/P EST MOD 30 MIN: CPT | Performed by: INTERNAL MEDICINE

## 2020-02-11 PROCEDURE — 80053 COMPREHEN METABOLIC PANEL: CPT | Performed by: INTERNAL MEDICINE

## 2020-02-11 PROCEDURE — 81003 URINALYSIS AUTO W/O SCOPE: CPT | Performed by: INTERNAL MEDICINE

## 2020-02-11 PROCEDURE — 83036 HEMOGLOBIN GLYCOSYLATED A1C: CPT | Performed by: INTERNAL MEDICINE

## 2020-02-11 PROCEDURE — 84153 ASSAY OF PSA TOTAL: CPT | Performed by: INTERNAL MEDICINE

## 2020-02-11 RX ORDER — BLOOD SUGAR DIAGNOSTIC
1 STRIP MISCELLANEOUS NIGHTLY
Qty: 50 EACH | Refills: 5 | Status: SHIPPED | OUTPATIENT
Start: 2020-02-11 | End: 2021-03-24

## 2020-02-11 RX ORDER — OXYBUTYNIN CHLORIDE 5 MG/1
5 TABLET, EXTENDED RELEASE ORAL DAILY
Qty: 30 TABLET | Refills: 5 | Status: SHIPPED | OUTPATIENT
Start: 2020-02-11 | End: 2020-05-01 | Stop reason: SDUPTHER

## 2020-02-11 NOTE — PROGRESS NOTES
Urinary Tract Infection (Has questions about medication)    Subjective   Carmelo Saucedo is a 59 y.o. male is here today for follow-up.    History of Present Illness   Had diarrhea, and his creatinine was worse, and was in [facility] ER.  Renal fxn - cr- 1.91, and glucose was 190.  Having incontinence, with trying to go to the bathroom      Current Outpatient Medications:   •  acyclovir (ZOVIRAX) 400 MG tablet, TAKE 1 TABLET BY MOUTH TWICE A DAY, Disp: 180 tablet, Rfl: 1  •  aspirin 81 MG chewable tablet, Chew 81 mg Daily., Disp: , Rfl:   •  atorvastatin (LIPITOR) 20 MG tablet, take 1 tablet by mouth every evening, Disp: 90 tablet, Rfl: 1  •  Cholecalciferol (VITAMIN D3) 5000 UNITS capsule capsule, Take 1 capsule by mouth daily., Disp: 30 capsule, Rfl: 5  •  clonazePAM (KlonoPIN) 1 MG tablet, Take 1 tablet by mouth 2 (Two) Times a Day., Disp: , Rfl: 0  •  HYDROcodone-acetaminophen (NORCO) 7.5-325 MG per tablet, Take 1 tablet by mouth 3 (Three) Times a Day., Disp: , Rfl: 0  •  hydrocortisone (ANUSOL-HC) 2.5 % rectal cream, Insert  into the rectum 2 (Two) Times a Day., Disp: 30 g, Rfl: 3  •  ipratropium-albuterol (COMBIVENT RESPIMAT)  MCG/ACT inhaler, Inhale 1 puff 4 (Four) Times a Day., Disp: 12 g, Rfl: 3  •  ipratropium-albuterol (DUONEB) 0.5-2.5 mg/3 ml nebulizer, Take 3 mL by nebulization Every 6 (Six) Hours., Disp: 120 vial, Rfl: 3  •  Lancets (FREESTYLE) lancets, FOR ONCE DAILY TESTING, Disp: 100 each, Rfl: 3  •  levothyroxine (SYNTHROID, LEVOTHROID) 88 MCG tablet, Take 1 tablet by mouth Daily., Disp: 90 tablet, Rfl: 1  •  linagliptin (TRADJENTA) 5 MG tablet tablet, Take 1 tablet by mouth Daily., Disp: 30 tablet, Rfl: 5  •  metoprolol succinate XL (TOPROL-XL) 50 MG 24 hr tablet, TAKE 1 TABLET BY MOUTH ONCE DAILY., Disp: 90 tablet, Rfl: 0  •  nitroglycerin (NITROSTAT) 0.4 MG SL tablet, Place 1 tablet under the tongue Every 5 (Five) Minutes As Needed for Chest Pain. Only take up to 3 tablets. Call 911 if pain  persists., Disp: 30 tablet, Rfl: 0  •  nystatin (MYCOSTATIN) 530537 UNIT/GM cream, Apply  topically 2 (Two) Times a Day., Disp: 15 g, Rfl: 0  •  pantoprazole (PROTONIX) 40 MG EC tablet, TAKE 1 TABLET BY MOUTH ONCE DAILY., Disp: 90 tablet, Rfl: 0  •  QUEtiapine (SEROquel) 400 MG tablet, Take 2 tablets by mouth every night., Disp: , Rfl:   •  tamsulosin (FLOMAX) 0.4 MG capsule 24 hr capsule, TAKE 2 CAPSULES BY MOUTH AT BEDTIME., Disp: 180 capsule, Rfl: 0  •  topiramate (TOPAMAX) 50 MG tablet, TAKE 2 TABLETS BY MOUTH AT BEDTIME., Disp: 180 tablet, Rfl: 0  •  traZODone (DESYREL) 100 MG tablet, Take 2 tablets by mouth Every Night., Disp: , Rfl: 0  •  vitamin D (ERGOCALCIFEROL) 69415 units capsule capsule, Take 1 capsule by mouth 1 (One) Time Per Week., Disp: 4 capsule, Rfl: 5  •  cycloSPORINE (RESTASIS) 0.05 % ophthalmic emulsion, Administer 1 drop to both eyes Every Night., Disp: 5.5 mL, Rfl: 5  •  DULoxetine (CYMBALTA) 60 MG capsule, Take 60 mg by mouth Daily., Disp: , Rfl:   •  FLUoxetine (PROzac) 40 MG capsule, Take 80 mg by mouth Daily., Disp: , Rfl:   •  glucose blood test strip, For daily testing with Freestyle meter., Disp: 100 each, Rfl: 3  •  hydrochlorothiazide (MICROZIDE) 12.5 MG capsule, take 1 capsule by mouth every morning, Disp: 90 capsule, Rfl: 1  •  Insulin Glargine (LANTUS SOLOSTAR) 100 UNIT/ML injection pen, Inject 8 Units under the skin into the appropriate area as directed Every Night., Disp: 5 pen, Rfl: 5  •  Insulin Pen Needle (PEN NEEDLES) 32G X 5 MM misc, 1 each Every Night., Disp: 50 each, Rfl: 5  •  lisinopril (PRINIVIL,ZESTRIL) 5 MG tablet, Take 1 tablet by mouth Daily., Disp: 90 tablet, Rfl: 1  •  loratadine (CLARITIN) 10 MG tablet, Take 1 tablet by mouth Daily., Disp: 30 tablet, Rfl: 5  •  meloxicam (MOBIC) 15 MG tablet, take 1 tablet by mouth once daily with food, Disp: 90 tablet, Rfl: 0  •  oxybutynin XL (DITROPAN-XL) 5 MG 24 hr tablet, Take 1 tablet by mouth Daily., Disp: 30 tablet, Rfl:  "5      The following portions of the patient's history were reviewed and updated as appropriate: allergies, current medications, past family history, past medical history, past social history, past surgical history and problem list.    Review of Systems   Constitutional: Positive for fatigue. Negative for chills and fever.   HENT: Negative for ear discharge, ear pain, sinus pressure and sore throat.    Respiratory: Negative for cough, chest tightness and shortness of breath.    Cardiovascular: Negative for chest pain, palpitations and leg swelling.   Gastrointestinal: Positive for diarrhea. Negative for nausea and vomiting.   Genitourinary: Positive for urgency.   Musculoskeletal: Negative for arthralgias, back pain and myalgias.   Neurological: Positive for weakness. Negative for dizziness, syncope and headaches.   Psychiatric/Behavioral: Positive for sleep disturbance. Negative for confusion. The patient is nervous/anxious.        Objective   /94   Pulse 69   Temp 98.2 °F (36.8 °C)   Resp 16   Ht 185.4 cm (72.99\")   Wt 105 kg (232 lb 6.4 oz)   SpO2 97%   BMI 30.67 kg/m²   Physical Exam   Constitutional: He is oriented to person, place, and time. He appears well-developed and well-nourished.   HENT:   Head: Normocephalic and atraumatic.   Right Ear: External ear normal.   Left Ear: External ear normal.   Mouth/Throat: No oropharyngeal exudate.   Eyes: Pupils are equal, round, and reactive to light. Conjunctivae are normal.   Neck: Neck supple. No thyromegaly present.   Cardiovascular: Normal rate and regular rhythm.   Pulmonary/Chest: Effort normal and breath sounds normal.   Abdominal: Soft. Bowel sounds are normal. He exhibits no distension. There is no tenderness.   Musculoskeletal: He exhibits tenderness. He exhibits no edema.   Neurological: He is alert and oriented to person, place, and time. No cranial nerve deficit.   Skin: Skin is warm and dry.   Psychiatric: He has a normal mood and affect. " Judgment normal.   Nursing note and vitals reviewed.        Results for orders placed or performed in visit on 02/11/20   PSA DIAGNOSTIC   Result Value Ref Range    PSA 1.310 0.000 - 4.000 ng/mL   Comprehensive Metabolic Panel   Result Value Ref Range    Glucose 146 (H) 65 - 99 mg/dL    BUN 18 8 - 23 mg/dL    Creatinine 1.79 (H) 0.76 - 1.27 mg/dL    Sodium 137 136 - 145 mmol/L    Potassium 4.3 3.5 - 5.2 mmol/L    Chloride 104 98 - 107 mmol/L    CO2 23.1 22.0 - 29.0 mmol/L    Calcium 9.5 8.6 - 10.5 mg/dL    Total Protein 7.3 6.0 - 8.5 g/dL    Albumin 4.30 3.50 - 5.20 g/dL    ALT (SGPT) 22 1 - 41 U/L    AST (SGOT) 18 1 - 40 U/L    Alkaline Phosphatase 87 39 - 117 U/L    Total Bilirubin 0.3 0.2 - 1.2 mg/dL    eGFR Non African Amer 39 (L) >60 mL/min/1.73    Globulin 3.0 gm/dL    A/G Ratio 1.4 g/dL    BUN/Creatinine Ratio 10.1 7.0 - 25.0    Anion Gap 9.9 5.0 - 15.0 mmol/L   Hemoglobin A1c   Result Value Ref Range    Hemoglobin A1C 7.10 (H) 4.80 - 5.60 %   POCT urinalysis dipstick, automated   Result Value Ref Range    Color Yellow Yellow, Straw, Dark Yellow, Irlanda    Clarity, UA Clear Clear    Specific Gravity  1.015 1.005 - 1.030    pH, Urine 6.0 5.0 - 8.0    Leukocytes Negative Negative    Nitrite, UA Negative Negative    Protein, POC Negative Negative mg/dL    Glucose, UA Negative Negative, 1000 mg/dL (3+) mg/dL    Ketones, UA Negative Negative    Urobilinogen, UA Normal Normal    Bilirubin Negative Negative    Blood, UA Negative Negative             Assessment/Plan   Diagnoses and all orders for this visit:    Urge incontinence  -     PSA DIAGNOSTIC  -     oxybutynin XL (DITROPAN-XL) 5 MG 24 hr tablet; Take 1 tablet by mouth Daily.  -     POCT urinalysis dipstick, automated    Chronic obstructive pulmonary disease with acute exacerbation (CMS/HCC)  -     ipratropium-albuterol (COMBIVENT RESPIMAT)  MCG/ACT inhaler; Inhale 1 puff 4 (Four) Times a Day.    TAYLOR (acute kidney injury) (CMS/[facility])  Comments:  appt ith  nephrology on 2/25  Orders:  -     Comprehensive Metabolic Panel    Type 2 diabetes mellitus with other specified complication, without long-term current use of insulin (CMS/[facility])  -     Hemoglobin A1c  -     Insulin Glargine (LANTUS SOLOSTAR) 100 UNIT/ML injection pen; Inject 8 Units under the skin into the appropriate area as directed Every Night.  -     Insulin Pen Needle (PEN NEEDLES) 32G X 5 MM misc; 1 each Every Night.    BPH with obstruction/lower urinary tract symptoms  -     PSA DIAGNOSTIC    reassured. Has nephrology eval on 2/25.              Return in about 3 months (around 5/11/2020).

## 2020-02-12 ENCOUNTER — TELEPHONE (OUTPATIENT)
Dept: INTERNAL MEDICINE | Facility: CLINIC | Age: 60
End: 2020-02-12

## 2020-02-13 NOTE — TELEPHONE ENCOUNTER
Please let Pt. Know that his labs show that his PSA is normal. Kidney function is slightly better , with creatinine of 1.79, and sugars are almost stable. A1C is 7.1, which is not bad. He should continue his current meds as discussed at his visit.    thanks

## 2020-02-15 DIAGNOSIS — E03.9 ACQUIRED HYPOTHYROIDISM: ICD-10-CM

## 2020-02-16 RX ORDER — LEVOTHYROXINE SODIUM 88 UG/1
TABLET ORAL
Qty: 90 TABLET | Refills: 1 | Status: SHIPPED | OUTPATIENT
Start: 2020-02-16 | End: 2020-08-04

## 2020-02-19 ENCOUNTER — TELEPHONE (OUTPATIENT)
Dept: INTERNAL MEDICINE | Facility: CLINIC | Age: 60
End: 2020-02-19

## 2020-02-19 NOTE — TELEPHONE ENCOUNTER
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  Pt is requesting a medication to help with his erectile dysfunction.  Pt is requesting a call back to discuss options.    [facility] in [place] confirmed.

## 2020-02-22 DIAGNOSIS — E78.2 MIXED HYPERLIPIDEMIA: ICD-10-CM

## 2020-02-22 RX ORDER — ATORVASTATIN CALCIUM 20 MG/1
TABLET, FILM COATED ORAL
Qty: 90 TABLET | Refills: 1 | Status: SHIPPED | OUTPATIENT
Start: 2020-02-22 | End: 2020-08-04

## 2020-02-24 RX ORDER — SILDENAFIL CITRATE 20 MG/1
TABLET ORAL
Qty: 20 TABLET | Refills: 1 | Status: SHIPPED | OUTPATIENT
Start: 2020-02-24 | End: 2020-08-07

## 2020-04-10 ENCOUNTER — TELEPHONE (OUTPATIENT)
Dept: INTERNAL MEDICINE | Facility: CLINIC | Age: 60
End: 2020-04-10

## 2020-04-10 NOTE — TELEPHONE ENCOUNTER
PATIENTS SUGARS ARE RUNNING AROUND 150 IN THE MORNINGS, HE IS WANTING TO KNOW IF YOU WANT HIM TO CHANGE HIS INSULIN DOSE. PATIENT CAN BE REACHED [phone].

## 2020-04-14 ENCOUNTER — TELEPHONE (OUTPATIENT)
Dept: INTERNAL MEDICINE | Facility: CLINIC | Age: 60
End: 2020-04-14

## 2020-04-22 ENCOUNTER — TELEMEDICINE (OUTPATIENT)
Dept: INTERNAL MEDICINE | Facility: CLINIC | Age: 60
End: 2020-04-22

## 2020-04-22 DIAGNOSIS — E11.69 TYPE 2 DIABETES MELLITUS WITH OTHER SPECIFIED COMPLICATION, WITHOUT LONG-TERM CURRENT USE OF INSULIN (HCC): ICD-10-CM

## 2020-04-22 DIAGNOSIS — N41.1 CHRONIC PROSTATITIS: ICD-10-CM

## 2020-04-22 PROCEDURE — 99213 OFFICE O/P EST LOW 20 MIN: CPT | Performed by: INTERNAL MEDICINE

## 2020-04-22 RX ORDER — TAMSULOSIN HYDROCHLORIDE 0.4 MG/1
1 CAPSULE ORAL
Qty: 90 CAPSULE | Refills: 1 | Status: SHIPPED | OUTPATIENT
Start: 2020-04-22 | End: 2020-11-12 | Stop reason: SDUPTHER

## 2020-04-22 RX ORDER — FINASTERIDE 5 MG/1
5 TABLET, FILM COATED ORAL DAILY
Qty: 30 TABLET | Refills: 2 | Status: SHIPPED | OUTPATIENT
Start: 2020-04-22 | End: 2020-06-23

## 2020-04-22 NOTE — PROGRESS NOTES
Diabetes (120 this AM, have been running this range since increase) and Memory issues    Subjective   Carmelo Saucedo is a 59 y.o. male is here today for follow-up.    History of Present Illness   Patient is presenting for an audio only video visit for follow-up on his type 2 diabetes.  His insulin was increased from 8 units to 10 units after which his fasting sugars came down from 160-170 down to 120 this a.m.  Notes increased memory issues, concerned. No longer followed by [facility]. Unable to get another psychiatrist.  Trouble urinating, dribbling, no dysuria.  S/p nephrology eval. Stopped his meloxicam and cut back on his flomax to 1 daily.          Current Outpatient Medications:   •  acyclovir (ZOVIRAX) 400 MG tablet, TAKE 1 TABLET BY MOUTH TWICE A DAY, Disp: 180 tablet, Rfl: 1  •  aspirin 81 MG chewable tablet, Chew 81 mg Daily., Disp: , Rfl:   •  atorvastatin (LIPITOR) 20 MG tablet, TAKE 1 TABLET BY MOUTH AT BEDTIME, Disp: 90 tablet, Rfl: 1  •  Cholecalciferol (VITAMIN D3) 5000 UNITS capsule capsule, Take 1 capsule by mouth daily., Disp: 30 capsule, Rfl: 5  •  clonazePAM (KlonoPIN) 1 MG tablet, Take 1 tablet by mouth 2 (Two) Times a Day., Disp: , Rfl: 0  •  DULoxetine (CYMBALTA) 60 MG capsule, Take 60 mg by mouth Daily., Disp: , Rfl:   •  FLUoxetine (PROzac) 40 MG capsule, Take 80 mg by mouth Daily., Disp: , Rfl:   •  glucose blood test strip, For daily testing with Freestyle meter., Disp: 100 each, Rfl: 3  •  hydrochlorothiazide (MICROZIDE) 12.5 MG capsule, take 1 capsule by mouth every morning, Disp: 90 capsule, Rfl: 1  •  HYDROcodone-acetaminophen (NORCO) 7.5-325 MG per tablet, Take 1 tablet by mouth 3 (Three) Times a Day., Disp: , Rfl: 0  •  hydrocortisone (ANUSOL-HC) 2.5 % rectal cream, Insert  into the rectum 2 (Two) Times a Day., Disp: 30 g, Rfl: 3  •  Insulin Glargine (LANTUS SOLOSTAR) 100 UNIT/ML injection pen, Inject 10 Units under the skin into the appropriate area as directed Every Night., Disp: 5 pen, Rfl:  1  •  Insulin Pen Needle (PEN NEEDLES) 32G X 5 MM misc, 1 each Every Night., Disp: 50 each, Rfl: 5  •  ipratropium-albuterol (COMBIVENT RESPIMAT)  MCG/ACT inhaler, Inhale 1 puff 4 (Four) Times a Day., Disp: 12 g, Rfl: 3  •  ipratropium-albuterol (DUONEB) 0.5-2.5 mg/3 ml nebulizer, Take 3 mL by nebulization Every 6 (Six) Hours., Disp: 120 vial, Rfl: 3  •  Lancets (FREESTYLE) lancets, FOR ONCE DAILY TESTING, Disp: 100 each, Rfl: 3  •  levothyroxine (SYNTHROID, LEVOTHROID) 88 MCG tablet, TAKE 1 TABLET BY MOUTH EVERY MORNING ON AN EMPTY STOMACH, Disp: 90 tablet, Rfl: 1  •  linagliptin (TRADJENTA) 5 MG tablet tablet, Take 1 tablet by mouth Daily., Disp: 30 tablet, Rfl: 5  •  lisinopril (PRINIVIL,ZESTRIL) 5 MG tablet, Take 1 tablet by mouth Daily., Disp: 90 tablet, Rfl: 1  •  loratadine (CLARITIN) 10 MG tablet, Take 1 tablet by mouth Daily., Disp: 30 tablet, Rfl: 5  •  metoprolol succinate XL (TOPROL-XL) 50 MG 24 hr tablet, TAKE 1 TABLET BY MOUTH ONCE DAILY., Disp: 90 tablet, Rfl: 0  •  nitroglycerin (NITROSTAT) 0.4 MG SL tablet, Place 1 tablet under the tongue Every 5 (Five) Minutes As Needed for Chest Pain. Only take up to 3 tablets. Call 911 if pain persists., Disp: 30 tablet, Rfl: 0  •  nystatin (MYCOSTATIN) 952001 UNIT/GM cream, Apply  topically 2 (Two) Times a Day., Disp: 15 g, Rfl: 0  •  oxybutynin XL (DITROPAN-XL) 5 MG 24 hr tablet, Take 1 tablet by mouth Daily., Disp: 30 tablet, Rfl: 5  •  pantoprazole (PROTONIX) 40 MG EC tablet, TAKE 1 TABLET BY MOUTH ONCE DAILY., Disp: 90 tablet, Rfl: 0  •  QUEtiapine (SEROquel) 400 MG tablet, Take 2 tablets by mouth every night., Disp: , Rfl:   •  sildenafil (REVATIO) 20 MG tablet, Take 2-3 tabs 1hr prior to intercourse., Disp: 20 tablet, Rfl: 1  •  tamsulosin (FLOMAX) 0.4 MG capsule 24 hr capsule, Take 1 capsule by mouth every night at bedtime., Disp: 90 capsule, Rfl: 1  •  topiramate (TOPAMAX) 50 MG tablet, TAKE 2 TABLETS BY MOUTH AT BEDTIME., Disp: 180 tablet, Rfl:  0  •  traZODone (DESYREL) 100 MG tablet, Take 2 tablets by mouth Every Night., Disp: , Rfl: 0  •  vitamin D (ERGOCALCIFEROL) 53733 units capsule capsule, Take 1 capsule by mouth 1 (One) Time Per Week., Disp: 4 capsule, Rfl: 5  •  cycloSPORINE (RESTASIS) 0.05 % ophthalmic emulsion, Administer 1 drop to both eyes Every Night., Disp: 5.5 mL, Rfl: 5  •  finasteride (PROSCAR) 5 MG tablet, Take 1 tablet by mouth Daily., Disp: 30 tablet, Rfl: 2      The following portions of the patient's history were reviewed and updated as appropriate: allergies, current medications, past family history, past medical history, past social history, past surgical history and problem list.    Review of Systems   Constitutional: Positive for fatigue. Negative for chills and fever.   HENT: Negative for ear discharge, ear pain, sinus pressure and sore throat.    Respiratory: Negative for cough, chest tightness and shortness of breath.    Cardiovascular: Negative for chest pain, palpitations and leg swelling.   Gastrointestinal: Negative for diarrhea, nausea and vomiting.   Genitourinary: Positive for difficulty urinating, enuresis and frequency.   Musculoskeletal: Positive for arthralgias and myalgias. Negative for back pain.   Neurological: Negative for dizziness, syncope and headaches.   Psychiatric/Behavioral: Positive for confusion. Negative for sleep disturbance.       Objective   There were no vitals taken for this visit.  Physical Exam   Constitutional: He is oriented to person, place, and time.   Pulmonary/Chest: Effort normal. No respiratory distress.   Neurological: He is alert and oriented to person, place, and time.   Psychiatric: He has a normal mood and affect. Judgment and thought content normal.         Results for orders placed or performed in visit on 02/11/20   PSA DIAGNOSTIC   Result Value Ref Range    PSA 1.310 0.000 - 4.000 ng/mL   Comprehensive Metabolic Panel   Result Value Ref Range    Glucose 146 (H) 65 - 99 mg/dL     BUN 18 8 - 23 mg/dL    Creatinine 1.79 (H) 0.76 - 1.27 mg/dL    Sodium 137 136 - 145 mmol/L    Potassium 4.3 3.5 - 5.2 mmol/L    Chloride 104 98 - 107 mmol/L    CO2 23.1 22.0 - 29.0 mmol/L    Calcium 9.5 8.6 - 10.5 mg/dL    Total Protein 7.3 6.0 - 8.5 g/dL    Albumin 4.30 3.50 - 5.20 g/dL    ALT (SGPT) 22 1 - 41 U/L    AST (SGOT) 18 1 - 40 U/L    Alkaline Phosphatase 87 39 - 117 U/L    Total Bilirubin 0.3 0.2 - 1.2 mg/dL    eGFR Non African Amer 39 (L) >60 mL/min/1.73    Globulin 3.0 gm/dL    A/G Ratio 1.4 g/dL    BUN/Creatinine Ratio 10.1 7.0 - 25.0    Anion Gap 9.9 5.0 - 15.0 mmol/L   Hemoglobin A1c   Result Value Ref Range    Hemoglobin A1C 7.10 (H) 4.80 - 5.60 %   POCT urinalysis dipstick, automated   Result Value Ref Range    Color Yellow Yellow, Straw, Dark Yellow, [place]landa    Clarity, UA Clear Clear    Specific Gravity  1.015 1.005 - 1.030    pH, Urine 6.0 5.0 - 8.0    Leukocytes Negative Negative    Nitrite, UA Negative Negative    Protein, POC Negative Negative mg/dL    Glucose, UA Negative Negative, 1000 mg/dL (3+) mg/dL    Ketones, UA Negative Negative    Urobilinogen, UA Normal Normal    Bilirubin Negative Negative    Blood, UA Negative Negative             Assessment/Plan   Diagnoses and all orders for this visit:    Chronic prostatitis  Comments:  Start finasteride to see if it helps otherwise will switch Flomax to uroxatral.  Orders:  -     tamsulosin (FLOMAX) 0.4 MG capsule 24 hr capsule; Take 1 capsule by mouth every night at bedtime.  -     finasteride (PROSCAR) 5 MG tablet; Take 1 tablet by mouth Daily.    Type 2 diabetes mellitus with other specified complication, without long-term current use of insulin (CMS/[facility])  Comments:  Cont. Insulin at 10 units.  Orders:  -     Insulin Glargine (LANTUS SOLOSTAR) 100 UNIT/ML injection pen; Inject 10 Units under the skin into the appropriate area as directed Every Night.             Number for olegario bahena gave her management given.  Advised to try to come  off of the Klonopin as it can make his memory worse.  Reassured that after his hospitalization for confusion it can take anywhere from 6 months to 1 year to get better.      This patient has consented to a telehealth visit via video. The visit was scheduled to comply with patient safety concerns in accordance with CDC recommendations.  I spent 20 minutes in total including but not limited to the 15 minutes spent in direct conversation with this patient.       Return in about 3 months (around 7/22/2020) for Next scheduled follow up.

## 2020-04-29 ENCOUNTER — TELEPHONE (OUTPATIENT)
Dept: INTERNAL MEDICINE | Facility: CLINIC | Age: 60
End: 2020-04-29

## 2020-04-29 NOTE — TELEPHONE ENCOUNTER
PT STATES THE  LEFT SIDE OF HIS NECK IS SWOLLEN AND BELOW HIS EAR. IT IS HARD FOR HIM TO EAT AND SLEEP. HIS EYES ARE BURNING AND IT'S HARD FOR HIM TO SEE. HE STARTED TAKING   finasteride (PROSCAR) 5 MG tablet  ABOUT A WEEK AGO AND THESE SYMPTOMS STARTED. PT SAID HE STOPPED TAKING THE MEDICATION YESTERDAY. PT DOESN'T KNOW IF IT'S THE MEDICINE OR NOT. PT SAID HE HAS HAD THIS BEFORE ABOUT A YEAR OR TWO AGO AND DOESN'T KNOW WHAT TO DO ABOUT IT   PLEASE ADVISE THE PATIENT      PT CALL BACK [phone]

## 2020-05-01 ENCOUNTER — TELEPHONE (OUTPATIENT)
Dept: INTERNAL MEDICINE | Facility: CLINIC | Age: 60
End: 2020-05-01

## 2020-05-01 DIAGNOSIS — F25.0 SCHIZOAFFECTIVE DISORDER, BIPOLAR TYPE (HCC): Primary | ICD-10-CM

## 2020-05-01 DIAGNOSIS — N39.41 URGE INCONTINENCE: ICD-10-CM

## 2020-05-01 DIAGNOSIS — I10 HYPERTENSION, UNSPECIFIED TYPE: ICD-10-CM

## 2020-05-01 RX ORDER — LISINOPRIL 5 MG/1
TABLET ORAL
Qty: 90 TABLET | Refills: 1 | Status: SHIPPED | OUTPATIENT
Start: 2020-05-01 | End: 2020-06-30

## 2020-05-01 RX ORDER — OXYBUTYNIN CHLORIDE 5 MG/1
5 TABLET, EXTENDED RELEASE ORAL DAILY
Qty: 90 TABLET | Refills: 1 | Status: SHIPPED | OUTPATIENT
Start: 2020-05-01 | End: 2020-10-07

## 2020-05-01 NOTE — TELEPHONE ENCOUNTER
PT CALLED STATES HE IS WANTING TO CHANGE HIS PRESCRIPTION TO A 90 DAY SUPPLY IS POSSIBLE    oxybutynin XL (DITROPAN-XL) 5 MG 24 hr tablet      CONTACT: [phone]     PLEASE ADVISE PT WANTED PCP TO KNOW HE IS WEINING HIMSELF OFF THE FOLLOWING MEDICATION AS WELL.  clonazePAM (KlonoPIN) 1 MG tablet    PT STATES HE IS ALSO WANTING TO KNOW IF PCP IS ABLE TO PRESCRIBE HIM THE FOLLOWING MEDICATIONS DUE TO HIM NOT BEING ABLE TO FIND A PSYCHIATRIST. PT STATES HE HAS CALLED MANY DIFFERENT PLACES AND THEY ARE EITHER NOT ACCEPTING PATIENTS OR IS HAVING TO PAY IN FULL.       FLUoxetine (PROzac) 40 MG capsule    traZODone (DESYREL) 100 MG tablet    QUEtiapine (SEROquel) 400 MG tablet  (PT STATES HE TAKES 200 MG NIGHTLY)

## 2020-05-03 NOTE — TELEPHONE ENCOUNTER
Please confirm the doses on the 3 pended Rx with patient before sending it.  The message gave the medications but not the dose and sig.    Please make sure they are right.  Thank you.

## 2020-05-04 RX ORDER — QUETIAPINE FUMARATE 400 MG/1
TABLET, FILM COATED ORAL
Qty: 60 TABLET | Refills: 1 | Status: SHIPPED | OUTPATIENT
Start: 2020-05-04 | End: 2020-05-22 | Stop reason: SDUPTHER

## 2020-05-04 RX ORDER — FLUOXETINE HYDROCHLORIDE 40 MG/1
80 CAPSULE ORAL DAILY
Qty: 180 CAPSULE | Refills: 1 | Status: SHIPPED | OUTPATIENT
Start: 2020-05-04 | End: 2020-10-07

## 2020-05-04 RX ORDER — TRAZODONE HYDROCHLORIDE 100 MG/1
200 TABLET ORAL NIGHTLY
Qty: 180 TABLET | Refills: 1 | Status: SHIPPED | OUTPATIENT
Start: 2020-05-04 | End: 2020-10-07 | Stop reason: SDUPTHER

## 2020-05-06 DIAGNOSIS — G43.909 MIGRAINE WITHOUT STATUS MIGRAINOSUS, NOT INTRACTABLE, UNSPECIFIED MIGRAINE TYPE: ICD-10-CM

## 2020-05-06 RX ORDER — METOPROLOL SUCCINATE 50 MG/1
50 TABLET, EXTENDED RELEASE ORAL DAILY
Qty: 90 TABLET | Refills: 0 | Status: SHIPPED | OUTPATIENT
Start: 2020-05-06 | End: 2020-08-03

## 2020-05-06 RX ORDER — TOPIRAMATE 50 MG/1
100 TABLET, FILM COATED ORAL
Qty: 180 TABLET | Refills: 0 | Status: SHIPPED | OUTPATIENT
Start: 2020-05-06 | End: 2020-08-04

## 2020-05-06 RX ORDER — PANTOPRAZOLE SODIUM 40 MG/1
40 TABLET, DELAYED RELEASE ORAL DAILY
Qty: 90 TABLET | Refills: 0 | Status: SHIPPED | OUTPATIENT
Start: 2020-05-06 | End: 2020-08-03

## 2020-05-06 NOTE — TELEPHONE ENCOUNTER
PT REQUESTING 90 DAY REFILL ON    topiramate (TOPAMAX) 50 MG tablet    pantoprazole (PROTONIX) 40 MG EC tablet      metoprolol succinate XL (TOPROL-XL) 50 MG 24 hr tablet    PHARMACY CONFIRMED      PLEASE ADVISE

## 2020-05-07 ENCOUNTER — TELEPHONE (OUTPATIENT)
Dept: INTERNAL MEDICINE | Facility: CLINIC | Age: 60
End: 2020-05-07

## 2020-05-07 NOTE — TELEPHONE ENCOUNTER
Please have him schedule a 15 min phone visit tomorrow.  Will discuss starting buspar then.  thanks

## 2020-05-07 NOTE — TELEPHONE ENCOUNTER
Patient is calling because he is being weaned off his Klonopin and he is having a hard time with the side effects. Patient was told that buspirone would counter act his side effects and wanted to know if Dr. Tello thought he should be on that. Please advise and call back    [phone]

## 2020-05-11 ENCOUNTER — OFFICE VISIT (OUTPATIENT)
Dept: INTERNAL MEDICINE | Facility: CLINIC | Age: 60
End: 2020-05-11

## 2020-05-11 DIAGNOSIS — F41.9 ANXIETY: Primary | ICD-10-CM

## 2020-05-11 PROCEDURE — 99442 PR PHYS/QHP TELEPHONE EVALUATION 11-20 MIN: CPT | Performed by: INTERNAL MEDICINE

## 2020-05-11 RX ORDER — BUSPIRONE HYDROCHLORIDE 10 MG/1
TABLET ORAL
Qty: 90 TABLET | Refills: 1 | Status: SHIPPED | OUTPATIENT
Start: 2020-05-11 | End: 2020-07-06 | Stop reason: SDUPTHER

## 2020-05-11 RX ORDER — HYDROXYZINE HYDROCHLORIDE 25 MG/1
25 TABLET, FILM COATED ORAL 3 TIMES DAILY PRN
Qty: 90 TABLET | Refills: 3 | Status: SHIPPED | OUTPATIENT
Start: 2020-05-11 | End: 2020-10-07

## 2020-05-22 DIAGNOSIS — F25.0 SCHIZOAFFECTIVE DISORDER, BIPOLAR TYPE (HCC): ICD-10-CM

## 2020-05-22 RX ORDER — QUETIAPINE FUMARATE 400 MG/1
TABLET, FILM COATED ORAL
Qty: 60 TABLET | Refills: 1 | Status: SHIPPED | OUTPATIENT
Start: 2020-05-22 | End: 2020-06-23 | Stop reason: SDUPTHER

## 2020-05-22 NOTE — TELEPHONE ENCOUNTER
JENN [facility] CALLED AND STATED THE PATIENT NEEDS A REFILL ON HIS QUEtiapine (SEROquel) 400 MG tablet. DR. MINOR WAS NOT THE PHYSICIAN THAT PRESCRIBED THE MEDICATION.     JENN IS WANTING TO KNOW IF DR. MINOR CAN WRITE THE SCRIPT.      JENN CALL BACK [phone]      PLEASE CALL AND ADVISE

## 2020-06-03 DIAGNOSIS — E11.69 TYPE 2 DIABETES MELLITUS WITH OTHER SPECIFIED COMPLICATION, WITHOUT LONG-TERM CURRENT USE OF INSULIN (HCC): ICD-10-CM

## 2020-06-03 RX ORDER — LINAGLIPTIN 5 MG/1
TABLET, FILM COATED ORAL
Qty: 90 TABLET | Refills: 0 | Status: SHIPPED | OUTPATIENT
Start: 2020-06-03 | End: 2020-08-21

## 2020-06-11 ENCOUNTER — TELEPHONE (OUTPATIENT)
Dept: INTERNAL MEDICINE | Facility: CLINIC | Age: 60
End: 2020-06-11

## 2020-06-11 DIAGNOSIS — B00.9 HSV-2 INFECTION: ICD-10-CM

## 2020-06-11 NOTE — TELEPHONE ENCOUNTER
Please have him schedule a phone or video visit for tomorrow.will schedule it after discussing with him.  Or in person- per his preference.    thanks

## 2020-06-11 NOTE — TELEPHONE ENCOUNTER
Pt said he is starting to have the same problems he did 7 years ago with bleeding from his rectum and pain when going to the bathroom even when it is not constipated.  Pt was told to get another colonoscopy in 3 years but he said it has been more like 7 years ago.  Pt is requesting a referral to get a colonoscopy done.  Pt requesting call back once referral is made.

## 2020-06-12 ENCOUNTER — OFFICE VISIT (OUTPATIENT)
Dept: INTERNAL MEDICINE | Facility: CLINIC | Age: 60
End: 2020-06-12

## 2020-06-12 DIAGNOSIS — N41.1 CHRONIC PROSTATITIS: ICD-10-CM

## 2020-06-12 DIAGNOSIS — K62.5 RECTAL BLEED: Primary | ICD-10-CM

## 2020-06-12 DIAGNOSIS — K62.89 RECTAL PAIN: ICD-10-CM

## 2020-06-12 PROCEDURE — 99442 PR PHYS/QHP TELEPHONE EVALUATION 11-20 MIN: CPT | Performed by: INTERNAL MEDICINE

## 2020-06-12 RX ORDER — POLYETHYLENE GLYCOL 3350 17 G/17G
17 POWDER, FOR SOLUTION ORAL DAILY
Qty: 500 G | Refills: 1 | Status: SHIPPED | OUTPATIENT
Start: 2020-06-12 | End: 2021-07-12

## 2020-06-12 RX ORDER — TAMSULOSIN HYDROCHLORIDE 0.4 MG/1
CAPSULE ORAL
Qty: 180 CAPSULE | OUTPATIENT
Start: 2020-06-12

## 2020-06-12 RX ORDER — ACYCLOVIR 400 MG/1
TABLET ORAL
Qty: 60 TABLET | Refills: 0 | Status: SHIPPED | OUTPATIENT
Start: 2020-06-12 | End: 2020-08-14

## 2020-06-12 NOTE — PROGRESS NOTES
GI Bleeding    Subjective   Carmelo Saucedo is a 59 y.o. male is here today for follow-up.    History of Present Illness   Here for a telephone visit with c/o rectal pain and bleed x 2-3 mos. Does have hemorrhoids but is concerned about the pain.Pt said he is starting to have the same problems he did 7 years ago with bleeding from his rectum and pain when going to the bathroom even when it is not constipated.  Pt was told to get another colonoscopy in 3 years but he said it has been more like 7 years ago.  Pt is requesting a referral to get a colonoscopy done.   Having narrow caliber stool and increased straining when he has a BM.      Current Outpatient Medications:   •  acyclovir (ZOVIRAX) 400 MG tablet, TAKE 1 TABLET BY MOUTH TWICE DAILY, Disp: 60 tablet, Rfl: 0  •  aspirin 81 MG chewable tablet, Chew 81 mg Daily., Disp: , Rfl:   •  atorvastatin (LIPITOR) 20 MG tablet, TAKE 1 TABLET BY MOUTH AT BEDTIME, Disp: 90 tablet, Rfl: 1  •  busPIRone (BUSPAR) 10 MG tablet, 1 PO AM x 3 days then 1 BID x 3 days then TID therafter, Disp: 90 tablet, Rfl: 1  •  Cholecalciferol (VITAMIN D3) 5000 UNITS capsule capsule, Take 1 capsule by mouth daily., Disp: 30 capsule, Rfl: 5  •  clonazePAM (KlonoPIN) 1 MG tablet, Take 1 tablet by mouth 2 (Two) Times a Day., Disp: , Rfl: 0  •  cycloSPORINE (RESTASIS) 0.05 % ophthalmic emulsion, Administer 1 drop to both eyes Every Night., Disp: 5.5 mL, Rfl: 5  •  finasteride (PROSCAR) 5 MG tablet, Take 1 tablet by mouth Daily., Disp: 30 tablet, Rfl: 2  •  FLUoxetine (PROzac) 40 MG capsule, Take 2 capsules by mouth Daily., Disp: 180 capsule, Rfl: 1  •  glucose blood test strip, For daily testing with Freestyle meter., Disp: 100 each, Rfl: 3  •  hydrochlorothiazide (MICROZIDE) 12.5 MG capsule, take 1 capsule by mouth every morning, Disp: 90 capsule, Rfl: 1  •  HYDROcodone-acetaminophen (NORCO) 7.5-325 MG per tablet, Take 1 tablet by mouth 3 (Three) Times a Day., Disp: , Rfl: 0  •  hydrocortisone  (ANUSOL-HC) 2.5 % rectal cream, Insert  into the rectum 2 (Two) Times a Day., Disp: 30 g, Rfl: 3  •  hydrOXYzine (ATARAX) 25 MG tablet, Take 1 tablet by mouth 3 (Three) Times a Day As Needed for Anxiety. Take instead of klonopin, Disp: 90 tablet, Rfl: 3  •  Insulin Glargine (LANTUS SOLOSTAR) 100 UNIT/ML injection pen, Inject 10 Units under the skin into the appropriate area as directed Every Night., Disp: 5 pen, Rfl: 1  •  Insulin Pen Needle (PEN NEEDLES) 32G X 5 MM misc, 1 each Every Night., Disp: 50 each, Rfl: 5  •  ipratropium-albuterol (COMBIVENT RESPIMAT)  MCG/ACT inhaler, Inhale 1 puff 4 (Four) Times a Day., Disp: 12 g, Rfl: 3  •  ipratropium-albuterol (DUONEB) 0.5-2.5 mg/3 ml nebulizer, Take 3 mL by nebulization Every 6 (Six) Hours., Disp: 120 vial, Rfl: 3  •  Lancets (FREESTYLE) lancets, FOR ONCE DAILY TESTING, Disp: 100 each, Rfl: 3  •  levothyroxine (SYNTHROID, LEVOTHROID) 88 MCG tablet, TAKE 1 TABLET BY MOUTH EVERY MORNING ON AN EMPTY STOMACH, Disp: 90 tablet, Rfl: 1  •  lisinopril (PRINIVIL,ZESTRIL) 5 MG tablet, TAKE 1 TABLET BY MOUTH ONCE DAILY, Disp: 90 tablet, Rfl: 1  •  loratadine (CLARITIN) 10 MG tablet, Take 1 tablet by mouth Daily., Disp: 30 tablet, Rfl: 5  •  metoprolol succinate XL (TOPROL-XL) 50 MG 24 hr tablet, Take 1 tablet by mouth Daily., Disp: 90 tablet, Rfl: 0  •  nitroglycerin (NITROSTAT) 0.4 MG SL tablet, Place 1 tablet under the tongue Every 5 (Five) Minutes As Needed for Chest Pain. Only take up to 3 tablets. Call 911 if pain persists., Disp: 30 tablet, Rfl: 0  •  nystatin (MYCOSTATIN) 313412 UNIT/GM cream, Apply  topically 2 (Two) Times a Day., Disp: 15 g, Rfl: 0  •  oxybutynin XL (DITROPAN-XL) 5 MG 24 hr tablet, Take 1 tablet by mouth Daily., Disp: 90 tablet, Rfl: 1  •  pantoprazole (PROTONIX) 40 MG EC tablet, Take 1 tablet by mouth Daily., Disp: 90 tablet, Rfl: 0  •  polyethylene glycol (GlycoLax) 17 GM/SCOOP powder, Take 17 g by mouth Daily., Disp: 500 g, Rfl: 1  •   QUEtiapine (SEROquel) 400 MG tablet, Take 2 tablets nightly, Disp: 60 tablet, Rfl: 1  •  sildenafil (REVATIO) 20 MG tablet, Take 2-3 tabs 1hr prior to intercourse., Disp: 20 tablet, Rfl: 1  •  tamsulosin (FLOMAX) 0.4 MG capsule 24 hr capsule, Take 1 capsule by mouth every night at bedtime., Disp: 90 capsule, Rfl: 1  •  topiramate (TOPAMAX) 50 MG tablet, Take 2 tablets by mouth every night at bedtime., Disp: 180 tablet, Rfl: 0  •  TRADJENTA 5 MG tablet tablet, TAKE 1 TABLET BY MOUTH EVERY DAY, Disp: 90 tablet, Rfl: 0  •  traZODone (DESYREL) 100 MG tablet, Take 2 tablets by mouth Every Night., Disp: 180 tablet, Rfl: 1  •  vitamin D (ERGOCALCIFEROL) 59590 units capsule capsule, Take 1 capsule by mouth 1 (One) Time Per Week., Disp: 4 capsule, Rfl: 5      The following portions of the patient's history were reviewed and updated as appropriate: allergies, current medications, past family history, past medical history, past social history, past surgical history and problem list.    Review of Systems   Constitutional: Positive for fatigue. Negative for chills and fever.   HENT: Negative for ear discharge, ear pain, sinus pressure and sore throat.    Respiratory: Negative for cough, chest tightness and shortness of breath.    Cardiovascular: Negative for chest pain, palpitations and leg swelling.   Gastrointestinal: Positive for anal bleeding, blood in stool, constipation, diarrhea and rectal pain. Negative for nausea and vomiting.   Musculoskeletal: Positive for arthralgias. Negative for back pain and myalgias.   Neurological: Negative for dizziness, syncope and headaches.   Psychiatric/Behavioral: Negative for confusion and sleep disturbance.       Objective   There were no vitals taken for this visit.  Physical Exam   Constitutional: He is oriented to person, place, and time.   Pulmonary/Chest: Effort normal. No respiratory distress.   Neurological: He is alert and oriented to person, place, and time.   Speech wnl    Psychiatric: He has a normal mood and affect. Judgment normal.         Results for orders placed or performed in visit on 02/11/20   PSA DIAGNOSTIC   Result Value Ref Range    PSA 1.310 0.000 - 4.000 ng/mL   Comprehensive Metabolic Panel   Result Value Ref Range    Glucose 146 (H) 65 - 99 mg/dL    BUN 18 8 - 23 mg/dL    Creatinine 1.79 (H) 0.76 - 1.27 mg/dL    Sodium 137 136 - 145 mmol/L    Potassium 4.3 3.5 - 5.2 mmol/L    Chloride 104 98 - 107 mmol/L    CO2 23.1 22.0 - 29.0 mmol/L    Calcium 9.5 8.6 - 10.5 mg/dL    Total Protein 7.3 6.0 - 8.5 g/dL    Albumin 4.30 3.50 - 5.20 g/dL    ALT (SGPT) 22 1 - 41 U/L    AST (SGOT) 18 1 - 40 U/L    Alkaline Phosphatase 87 39 - 117 U/L    Total Bilirubin 0.3 0.2 - 1.2 mg/dL    eGFR Non African Amer 39 (L) >60 mL/min/1.73    Globulin 3.0 gm/dL    A/G Ratio 1.4 g/dL    BUN/Creatinine Ratio 10.1 7.0 - 25.0    Anion Gap 9.9 5.0 - 15.0 mmol/L   Hemoglobin A1c   Result Value Ref Range    Hemoglobin A1C 7.10 (H) 4.80 - 5.60 %   POCT urinalysis dipstick, automated   Result Value Ref Range    Color Yellow Yellow, Straw, Dark Yellow, [place]    Clarity, UA Clear Clear    Specific Gravity  1.015 1.005 - 1.030    pH, Urine 6.0 5.0 - 8.0    Leukocytes Negative Negative    Nitrite, UA Negative Negative    Protein, POC Negative Negative mg/dL    Glucose, UA Negative Negative, 1000 mg/dL (3+) mg/dL    Ketones, UA Negative Negative    Urobilinogen, UA Normal Normal    Bilirubin Negative Negative    Blood, UA Negative Negative       [facility] COLONOSCOPY   Order: [id number]   Status:  Final result   Visible to patient:  No (Not Released)   Component 6yr ago    Colonoscopy [facility] - Non-thrombosed internal hemorrhoids found in rectum. Four benign appearing 3-5 mm polyps in rectum, sigmoid, ascending colon. One 6 mm polyp in ascending colon. One 6 mm polyp in sigmoid colon. Non-bleeding internal hemorrhoids.         Last Resulted: 08/07/13 08:36                 Assessment/Plan   Diagnoses and all  orders for this visit:    Rectal bleed  -     Ambulatory Referral For Screening Colonoscopy    Rectal pain  -     Ambulatory Referral For Screening Colonoscopy  -     polyethylene glycol (GlycoLax) 17 GM/SCOOP powder; Take 17 g by mouth Daily.             He was supposed to go back in 3 years for repeat, but never did so.  Proceed with Diagnostic colonoscopy. H/o 4 polypts- TA in 2013, now with altered BMs and GI bleed.      This visit has been rescheduled as a phone visit to comply with patient safety concerns in accordance with CDC recommendations. Total time of discussion was 16 minutes.      You have chosen to receive care through a telephone visit. Do you consent to use a telephone visit for your medical care today? Yes      Return for Next scheduled follow up.

## 2020-06-16 DIAGNOSIS — N41.1 CHRONIC PROSTATITIS: ICD-10-CM

## 2020-06-16 RX ORDER — TAMSULOSIN HYDROCHLORIDE 0.4 MG/1
1 CAPSULE ORAL
Qty: 90 CAPSULE | Refills: 1 | OUTPATIENT
Start: 2020-06-16

## 2020-06-16 NOTE — TELEPHONE ENCOUNTER
Spoke with [facility], they have rf's on file, let pt know he still has refill at his pharmacy. Verbalized understanding

## 2020-06-18 ENCOUNTER — TELEPHONE (OUTPATIENT)
Dept: INTERNAL MEDICINE | Facility: CLINIC | Age: 60
End: 2020-06-18

## 2020-06-18 NOTE — TELEPHONE ENCOUNTER
PT HAS A COUGH, AND SHORTNESS OF BREATHE, CHANGED HIS APPOINTMENT TO [facility] TELEPHONE VISIT FOR 6/23/2020, HE WANTS TO KNOW IF HE NEEDS TO HAVE BLOOD WORK FIRST    PLEASE ADVISE PT

## 2020-06-23 ENCOUNTER — TELEMEDICINE (OUTPATIENT)
Dept: INTERNAL MEDICINE | Facility: CLINIC | Age: 60
End: 2020-06-23

## 2020-06-23 DIAGNOSIS — E55.9 VITAMIN D DEFICIENCY: ICD-10-CM

## 2020-06-23 DIAGNOSIS — E03.9 ACQUIRED HYPOTHYROIDISM: ICD-10-CM

## 2020-06-23 DIAGNOSIS — E11.69 TYPE 2 DIABETES MELLITUS WITH OTHER SPECIFIED COMPLICATION, WITHOUT LONG-TERM CURRENT USE OF INSULIN (HCC): Primary | ICD-10-CM

## 2020-06-23 DIAGNOSIS — E78.2 MIXED HYPERLIPIDEMIA: ICD-10-CM

## 2020-06-23 DIAGNOSIS — F25.0 SCHIZOAFFECTIVE DISORDER, BIPOLAR TYPE (HCC): ICD-10-CM

## 2020-06-23 DIAGNOSIS — F41.3 OTHER MIXED ANXIETY DISORDERS: ICD-10-CM

## 2020-06-23 DIAGNOSIS — N41.1 CHRONIC PROSTATITIS: ICD-10-CM

## 2020-06-23 PROCEDURE — 99443 PR PHYS/QHP TELEPHONE EVALUATION 21-30 MIN: CPT | Performed by: INTERNAL MEDICINE

## 2020-06-23 RX ORDER — FINASTERIDE 5 MG/1
5 TABLET, FILM COATED ORAL DAILY
Qty: 30 TABLET | Refills: 2
Start: 2020-06-23 | End: 2020-10-07

## 2020-06-23 RX ORDER — QUETIAPINE FUMARATE 400 MG/1
TABLET, FILM COATED ORAL
Qty: 180 TABLET | Refills: 1 | Status: SHIPPED | OUTPATIENT
Start: 2020-06-23 | End: 2020-10-07

## 2020-06-23 NOTE — PROGRESS NOTES
Shortness of Breath (Better) and Cough (going Thursday to be checked for COVID)    Subjective   Carmelo Saucedo is a 59 y.o. male is here today for follow-up.    History of Present Illness   Here for a follow up on his soa, and GI bleed/ constipation. Has a scope planned for later this month. No rectal pain or bleeding.  Still coughing, scheduled for covid testing this Thursday.  Started smoking again, cannot take chantix.  Has not started the buspar yet.    Current Outpatient Medications:   •  acyclovir (ZOVIRAX) 400 MG tablet, TAKE 1 TABLET BY MOUTH TWICE DAILY, Disp: 60 tablet, Rfl: 0  •  aspirin 81 MG chewable tablet, Chew 81 mg Daily., Disp: , Rfl:   •  atorvastatin (LIPITOR) 20 MG tablet, TAKE 1 TABLET BY MOUTH AT BEDTIME, Disp: 90 tablet, Rfl: 1  •  busPIRone (BUSPAR) 10 MG tablet, 1 PO AM x 3 days then 1 BID x 3 days then TID therafter, Disp: 90 tablet, Rfl: 1  •  Cholecalciferol (VITAMIN D3) 125 MCG (5000 UT) capsule capsule, Take 1 capsule by mouth Daily., Disp: 30 capsule, Rfl: 5  •  clonazePAM (KlonoPIN) 1 MG tablet, Take 0.25 tablets by mouth 2 (Two) Times a Day., Disp: , Rfl: 0  •  FLUoxetine (PROzac) 40 MG capsule, Take 2 capsules by mouth Daily., Disp: 180 capsule, Rfl: 1  •  glucose blood test strip, For daily testing with Freestyle meter., Disp: 100 each, Rfl: 3  •  HYDROcodone-acetaminophen (NORCO) 7.5-325 MG per tablet, Take 1 tablet by mouth 3 (Three) Times a Day., Disp: , Rfl: 0  •  hydrocortisone (ANUSOL-HC) 2.5 % rectal cream, Insert  into the rectum 2 (Two) Times a Day., Disp: 30 g, Rfl: 3  •  Insulin Glargine (LANTUS SOLOSTAR) 100 UNIT/ML injection pen, Inject 10 Units under the skin into the appropriate area as directed Every Night., Disp: 5 pen, Rfl: 1  •  Insulin Pen Needle (PEN NEEDLES) 32G X 5 MM misc, 1 each Every Night., Disp: 50 each, Rfl: 5  •  ipratropium-albuterol (COMBIVENT RESPIMAT)  MCG/ACT inhaler, Inhale 1 puff 4 (Four) Times a Day., Disp: 12 g, Rfl: 3  •   ipratropium-albuterol (DUONEB) 0.5-2.5 mg/3 ml nebulizer, Take 3 mL by nebulization Every 6 (Six) Hours., Disp: 120 vial, Rfl: 3  •  Lancets (FREESTYLE) lancets, FOR ONCE DAILY TESTING, Disp: 100 each, Rfl: 3  •  levothyroxine (SYNTHROID, LEVOTHROID) 88 MCG tablet, TAKE 1 TABLET BY MOUTH EVERY MORNING ON AN EMPTY STOMACH, Disp: 90 tablet, Rfl: 1  •  lisinopril (PRINIVIL,ZESTRIL) 5 MG tablet, TAKE 1 TABLET BY MOUTH ONCE DAILY, Disp: 90 tablet, Rfl: 1  •  metoprolol succinate XL (TOPROL-XL) 50 MG 24 hr tablet, Take 1 tablet by mouth Daily., Disp: 90 tablet, Rfl: 0  •  nitroglycerin (NITROSTAT) 0.4 MG SL tablet, Place 1 tablet under the tongue Every 5 (Five) Minutes As Needed for Chest Pain. Only take up to 3 tablets. Call 911 if pain persists., Disp: 30 tablet, Rfl: 0  •  oxybutynin XL (DITROPAN-XL) 5 MG 24 hr tablet, Take 1 tablet by mouth Daily., Disp: 90 tablet, Rfl: 1  •  pantoprazole (PROTONIX) 40 MG EC tablet, Take 1 tablet by mouth Daily., Disp: 90 tablet, Rfl: 0  •  polyethylene glycol (GlycoLax) 17 GM/SCOOP powder, Take 17 g by mouth Daily., Disp: 500 g, Rfl: 1  •  QUEtiapine (SEROquel) 400 MG tablet, Take 2 tablets nightly, Disp: 180 tablet, Rfl: 1  •  sildenafil (REVATIO) 20 MG tablet, Take 2-3 tabs 1hr prior to intercourse., Disp: 20 tablet, Rfl: 1  •  tamsulosin (FLOMAX) 0.4 MG capsule 24 hr capsule, Take 1 capsule by mouth every night at bedtime., Disp: 90 capsule, Rfl: 1  •  topiramate (TOPAMAX) 50 MG tablet, Take 2 tablets by mouth every night at bedtime., Disp: 180 tablet, Rfl: 0  •  TRADJENTA 5 MG tablet tablet, TAKE 1 TABLET BY MOUTH EVERY DAY, Disp: 90 tablet, Rfl: 0  •  traZODone (DESYREL) 100 MG tablet, Take 2 tablets by mouth Every Night., Disp: 180 tablet, Rfl: 1  •  finasteride (PROSCAR) 5 MG tablet, Take 1 tablet by mouth Daily., Disp: 30 tablet, Rfl: 2  •  hydrochlorothiazide (MICROZIDE) 12.5 MG capsule, take 1 capsule by mouth every morning, Disp: 90 capsule, Rfl: 1  •  hydrOXYzine (ATARAX)  25 MG tablet, Take 1 tablet by mouth 3 (Three) Times a Day As Needed for Anxiety. Take instead of klonopin, Disp: 90 tablet, Rfl: 3      The following portions of the patient's history were reviewed and updated as appropriate: allergies, current medications, past family history, past medical history, past social history, past surgical history and problem list.    Review of Systems   Constitutional: Positive for activity change, appetite change and fatigue. Negative for chills and fever.   HENT: Negative for ear discharge, ear pain, sinus pressure and sore throat.    Respiratory: Negative for cough, chest tightness and shortness of breath.    Cardiovascular: Negative for chest pain, palpitations and leg swelling.   Gastrointestinal: Positive for blood in stool (better) and rectal pain (better). Negative for diarrhea, nausea and vomiting.   Genitourinary: Positive for difficulty urinating and frequency.   Musculoskeletal: Negative for arthralgias, back pain and myalgias.   Neurological: Positive for tremors. Negative for dizziness, syncope and headaches.   Psychiatric/Behavioral: Positive for sleep disturbance. Negative for confusion. The patient is nervous/anxious.        Objective   There were no vitals taken for this visit.  Physical Exam   Constitutional: He is oriented to person, place, and time.   Pulmonary/Chest: Effort normal. No respiratory distress.   Neurological: He is alert and oriented to person, place, and time.   Speech wnl   Psychiatric: He has a normal mood and affect. Judgment normal.         Results for orders placed or performed in visit on 02/11/20   PSA DIAGNOSTIC   Result Value Ref Range    PSA 1.310 0.000 - 4.000 ng/mL   Comprehensive Metabolic Panel   Result Value Ref Range    Glucose 146 (H) 65 - 99 mg/dL    BUN 18 8 - 23 mg/dL    Creatinine 1.79 (H) 0.76 - 1.27 mg/dL    Sodium 137 136 - 145 mmol/L    Potassium 4.3 3.5 - 5.2 mmol/L    Chloride 104 98 - 107 mmol/L    CO2 23.1 22.0 - 29.0  mmol/L    Calcium 9.5 8.6 - 10.5 mg/dL    Total Protein 7.3 6.0 - 8.5 g/dL    Albumin 4.30 3.50 - 5.20 g/dL    ALT (SGPT) 22 1 - 41 U/L    AST (SGOT) 18 1 - 40 U/L    Alkaline Phosphatase 87 39 - 117 U/L    Total Bilirubin 0.3 0.2 - 1.2 mg/dL    eGFR Non African Amer 39 (L) >60 mL/min/1.73    Globulin 3.0 gm/dL    A/G Ratio 1.4 g/dL    BUN/Creatinine Ratio 10.1 7.0 - 25.0    Anion Gap 9.9 5.0 - 15.0 mmol/L   Hemoglobin A1c   Result Value Ref Range    Hemoglobin A1C 7.10 (H) 4.80 - 5.60 %   POCT urinalysis dipstick, automated   Result Value Ref Range    Color Yellow Yellow, Straw, Dark Yellow, [place]    Clarity, UA Clear Clear    Specific Gravity  1.015 1.005 - 1.030    pH, Urine 6.0 5.0 - 8.0    Leukocytes Negative Negative    Nitrite, UA Negative Negative    Protein, POC Negative Negative mg/dL    Glucose, UA Negative Negative, 1000 mg/dL (3+) mg/dL    Ketones, UA Negative Negative    Urobilinogen, UA Normal Normal    Bilirubin Negative Negative    Blood, UA Negative Negative             Assessment/Plan   Diagnoses and all orders for this visit:    Type 2 diabetes mellitus with other specified complication, without long-term current use of insulin (CMS/[facility])  -     Hemoglobin A1c; Future    Schizoaffective disorder, bipolar type (CMS/[facility])  -     QUEtiapine (SEROquel) 400 MG tablet; Take 2 tablets nightly    Chronic prostatitis  Comments:  Start finasteride to see if it helps otherwise will switch Flomax to uroxatral.  Orders:  -     finasteride (PROSCAR) 5 MG tablet; Take 1 tablet by mouth Daily.    Mixed hyperlipidemia  -     Comprehensive Metabolic Panel; Future  -     Lipid Panel; Future    Acquired hypothyroidism  -     TSH; Future    Vitamin D deficiency  -     Cholecalciferol (VITAMIN D3) 125 MCG (5000 UT) capsule capsule; Take 1 capsule by mouth Daily.    Other mixed anxiety disorders  Comments:  Reiterated to start BuSpar.  Call if not better in 4 to 5 weeks, will add Wellbutrin as it can help with smoking  cessation as well.    start finasteride for prostate issues.      This visit has been rescheduled as a phone visit to comply with patient safety concerns in accordance with CDC recommendations. Total time of discussion was 23 minutes.         You have chosen to receive care through a telephone visit. Do you consent to use a telephone visit for your medical care today? Yes    Return for Next scheduled follow up.

## 2020-06-24 ENCOUNTER — LAB (OUTPATIENT)
Dept: LAB | Facility: HOSPITAL | Age: 60
End: 2020-06-24

## 2020-06-24 DIAGNOSIS — E78.2 MIXED HYPERLIPIDEMIA: ICD-10-CM

## 2020-06-24 DIAGNOSIS — E03.9 ACQUIRED HYPOTHYROIDISM: ICD-10-CM

## 2020-06-24 DIAGNOSIS — E11.69 TYPE 2 DIABETES MELLITUS WITH OTHER SPECIFIED COMPLICATION, WITHOUT LONG-TERM CURRENT USE OF INSULIN (HCC): ICD-10-CM

## 2020-06-24 LAB
ALBUMIN SERPL-MCNC: 4.4 G/DL (ref 3.5–5.2)
ALBUMIN/GLOB SERPL: 1.7 G/DL
ALP SERPL-CCNC: 92 U/L (ref 39–117)
ALT SERPL W P-5'-P-CCNC: 19 U/L (ref 1–41)
ANION GAP SERPL CALCULATED.3IONS-SCNC: 10 MMOL/L (ref 5–15)
AST SERPL-CCNC: 12 U/L (ref 1–40)
BILIRUB SERPL-MCNC: 0.3 MG/DL (ref 0.2–1.2)
BUN BLD-MCNC: 21 MG/DL (ref 8–23)
BUN/CREAT SERPL: 11.1 (ref 7–25)
CALCIUM SPEC-SCNC: 9.5 MG/DL (ref 8.6–10.5)
CHLORIDE SERPL-SCNC: 107 MMOL/L (ref 98–107)
CHOLEST SERPL-MCNC: 107 MG/DL (ref 0–200)
CO2 SERPL-SCNC: 22 MMOL/L (ref 22–29)
CREAT BLD-MCNC: 1.9 MG/DL (ref 0.76–1.27)
GFR SERPL CREATININE-BSD FRML MDRD: 36 ML/MIN/1.73
GLOBULIN UR ELPH-MCNC: 2.6 GM/DL
GLUCOSE BLD-MCNC: 132 MG/DL (ref 65–99)
HBA1C MFR BLD: 5.9 % (ref 4.8–5.6)
HDLC SERPL-MCNC: 21 MG/DL (ref 40–60)
LDLC SERPL CALC-MCNC: 61 MG/DL (ref 0–100)
LDLC/HDLC SERPL: 2.9 {RATIO}
POTASSIUM BLD-SCNC: 4.7 MMOL/L (ref 3.5–5.2)
PROT SERPL-MCNC: 7 G/DL (ref 6–8.5)
SODIUM BLD-SCNC: 139 MMOL/L (ref 136–145)
TRIGL SERPL-MCNC: 125 MG/DL (ref 0–150)
TSH SERPL DL<=0.05 MIU/L-ACNC: 1.7 UIU/ML (ref 0.27–4.2)
VLDLC SERPL-MCNC: 25 MG/DL (ref 5–40)

## 2020-06-24 PROCEDURE — 80053 COMPREHEN METABOLIC PANEL: CPT

## 2020-06-24 PROCEDURE — 83036 HEMOGLOBIN GLYCOSYLATED A1C: CPT

## 2020-06-24 PROCEDURE — 84443 ASSAY THYROID STIM HORMONE: CPT

## 2020-06-24 PROCEDURE — 80061 LIPID PANEL: CPT

## 2020-06-29 RX ORDER — NITROGLYCERIN 0.4 MG/1
0.4 TABLET SUBLINGUAL
Qty: 30 TABLET | Refills: 0 | Status: SHIPPED | OUTPATIENT
Start: 2020-06-29 | End: 2021-05-05 | Stop reason: SDUPTHER

## 2020-06-29 NOTE — TELEPHONE ENCOUNTER
Pt called because he was seen at the ED last night and he did not like his service pt stated that he is having chest pains. He also has a cough and shortness of breath. Pt stated that he would like to come in for an appt to be checked out.     Pt has been scheduled for an appt on Wednesday 7/01/20     Please call pt to confirm that this appt will still stand     [phone]

## 2020-06-30 ENCOUNTER — OFFICE VISIT (OUTPATIENT)
Dept: INTERNAL MEDICINE | Facility: CLINIC | Age: 60
End: 2020-06-30

## 2020-06-30 DIAGNOSIS — R07.89 OTHER CHEST PAIN: ICD-10-CM

## 2020-06-30 DIAGNOSIS — I10 HYPERTENSION, UNSPECIFIED TYPE: ICD-10-CM

## 2020-06-30 DIAGNOSIS — R05.8 COUGH DUE TO ACE INHIBITOR: ICD-10-CM

## 2020-06-30 DIAGNOSIS — T46.4X5A COUGH DUE TO ACE INHIBITOR: ICD-10-CM

## 2020-06-30 DIAGNOSIS — K21.00 GASTROESOPHAGEAL REFLUX DISEASE WITH ESOPHAGITIS: Primary | ICD-10-CM

## 2020-06-30 PROCEDURE — 99442 PR PHYS/QHP TELEPHONE EVALUATION 11-20 MIN: CPT | Performed by: INTERNAL MEDICINE

## 2020-06-30 RX ORDER — SUCRALFATE 1 G/1
1 TABLET ORAL 4 TIMES DAILY
Qty: 60 TABLET | Refills: 0 | Status: SHIPPED | OUTPATIENT
Start: 2020-06-30 | End: 2020-10-07

## 2020-06-30 RX ORDER — LOSARTAN POTASSIUM 25 MG/1
25 TABLET ORAL DAILY
Qty: 30 TABLET | Refills: 3 | Status: SHIPPED | OUTPATIENT
Start: 2020-06-30 | End: 2020-08-05 | Stop reason: SDUPTHER

## 2020-06-30 NOTE — PROGRESS NOTES
Cough (with SOA)    Subjective   Carmelo Saucedo is a 59 y.o. male is here today for follow-up.    History of Present Illness   Having CP, and dizziness. And was seen at [facility], was told not cardiac, thinks related to pancreas. Ruled out for MI., and was adv. To f/u with me.  Off clonazepam, ? If related to that.  soa is not much worse, cough is stable but persistent.  Thinks from his COPD.      Current Outpatient Medications:   •  acyclovir (ZOVIRAX) 400 MG tablet, TAKE 1 TABLET BY MOUTH TWICE DAILY, Disp: 60 tablet, Rfl: 0  •  aspirin 81 MG chewable tablet, Chew 81 mg Daily., Disp: , Rfl:   •  atorvastatin (LIPITOR) 20 MG tablet, TAKE 1 TABLET BY MOUTH AT BEDTIME, Disp: 90 tablet, Rfl: 1  •  busPIRone (BUSPAR) 10 MG tablet, 1 PO AM x 3 days then 1 BID x 3 days then TID therafter, Disp: 90 tablet, Rfl: 1  •  Cholecalciferol (VITAMIN D3) 125 MCG (5000 UT) capsule capsule, Take 1 capsule by mouth Daily., Disp: 30 capsule, Rfl: 5  •  finasteride (PROSCAR) 5 MG tablet, Take 1 tablet by mouth Daily., Disp: 30 tablet, Rfl: 2  •  FLUoxetine (PROzac) 40 MG capsule, Take 2 capsules by mouth Daily., Disp: 180 capsule, Rfl: 1  •  glucose blood test strip, For daily testing with Freestyle meter., Disp: 100 each, Rfl: 3  •  hydrochlorothiazide (MICROZIDE) 12.5 MG capsule, take 1 capsule by mouth every morning, Disp: 90 capsule, Rfl: 1  •  HYDROcodone-acetaminophen (NORCO) 7.5-325 MG per tablet, Take 1 tablet by mouth 3 (Three) Times a Day., Disp: , Rfl: 0  •  hydrocortisone (ANUSOL-HC) 2.5 % rectal cream, Insert  into the rectum 2 (Two) Times a Day., Disp: 30 g, Rfl: 3  •  hydrOXYzine (ATARAX) 25 MG tablet, Take 1 tablet by mouth 3 (Three) Times a Day As Needed for Anxiety. Take instead of klonopin, Disp: 90 tablet, Rfl: 3  •  Insulin Glargine (LANTUS SOLOSTAR) 100 UNIT/ML injection pen, Inject 10 Units under the skin into the appropriate area as directed Every Night., Disp: 5 pen, Rfl: 1  •  Insulin Pen Needle (PEN NEEDLES)  32G X 5 MM misc, 1 each Every Night., Disp: 50 each, Rfl: 5  •  ipratropium-albuterol (COMBIVENT RESPIMAT)  MCG/ACT inhaler, Inhale 1 puff 4 (Four) Times a Day., Disp: 12 g, Rfl: 3  •  ipratropium-albuterol (DUONEB) 0.5-2.5 mg/3 ml nebulizer, Take 3 mL by nebulization Every 6 (Six) Hours., Disp: 120 vial, Rfl: 3  •  Lancets (FREESTYLE) lancets, FOR ONCE DAILY TESTING, Disp: 100 each, Rfl: 3  •  levothyroxine (SYNTHROID, LEVOTHROID) 88 MCG tablet, TAKE 1 TABLET BY MOUTH EVERY MORNING ON AN EMPTY STOMACH, Disp: 90 tablet, Rfl: 1  •  metoprolol succinate XL (TOPROL-XL) 50 MG 24 hr tablet, Take 1 tablet by mouth Daily., Disp: 90 tablet, Rfl: 0  •  nitroglycerin (Nitrostat) 0.4 MG SL tablet, Place 1 tablet under the tongue Every 5 (Five) Minutes As Needed for Chest Pain. Only take up to 3 tablets. Call 911 if pain persists., Disp: 30 tablet, Rfl: 0  •  oxybutynin XL (DITROPAN-XL) 5 MG 24 hr tablet, Take 1 tablet by mouth Daily., Disp: 90 tablet, Rfl: 1  •  pantoprazole (PROTONIX) 40 MG EC tablet, Take 1 tablet by mouth Daily., Disp: 90 tablet, Rfl: 0  •  polyethylene glycol (GlycoLax) 17 GM/SCOOP powder, Take 17 g by mouth Daily., Disp: 500 g, Rfl: 1  •  QUEtiapine (SEROquel) 400 MG tablet, Take 2 tablets nightly, Disp: 180 tablet, Rfl: 1  •  sildenafil (REVATIO) 20 MG tablet, Take 2-3 tabs 1hr prior to intercourse., Disp: 20 tablet, Rfl: 1  •  tamsulosin (FLOMAX) 0.4 MG capsule 24 hr capsule, Take 1 capsule by mouth every night at bedtime., Disp: 90 capsule, Rfl: 1  •  topiramate (TOPAMAX) 50 MG tablet, Take 2 tablets by mouth every night at bedtime., Disp: 180 tablet, Rfl: 0  •  TRADJENTA 5 MG tablet tablet, TAKE 1 TABLET BY MOUTH EVERY DAY, Disp: 90 tablet, Rfl: 0  •  traZODone (DESYREL) 100 MG tablet, Take 2 tablets by mouth Every Night., Disp: 180 tablet, Rfl: 1  •  losartan (Cozaar) 25 MG tablet, Take 1 tablet by mouth Daily. Replaces lisinopril, Disp: 30 tablet, Rfl: 3  •  sucralfate (CARAFATE) 1 g tablet,  Take 1 tablet by mouth 4 (Four) Times a Day., Disp: 60 tablet, Rfl: 0      The following portions of the patient's history were reviewed and updated as appropriate: allergies, current medications, past family history, past medical history, past social history, past surgical history and problem list.    Review of Systems   Constitutional: Positive for fatigue. Negative for chills and fever.   HENT: Negative for ear discharge, ear pain, sinus pressure and sore throat.    Respiratory: Positive for cough and shortness of breath. Negative for chest tightness.    Cardiovascular: Positive for chest pain. Negative for palpitations and leg swelling.   Gastrointestinal: Positive for nausea. Negative for diarrhea and vomiting.   Musculoskeletal: Negative for arthralgias, back pain and myalgias.   Neurological: Negative for dizziness, syncope and headaches.   Psychiatric/Behavioral: Negative for confusion and sleep disturbance.       Objective   There were no vitals taken for this visit.  Physical Exam   Constitutional: He is oriented to person, place, and time.   Pulmonary/Chest: Effort normal. No respiratory distress.   Neurological: He is alert and oriented to person, place, and time.   Speech wnl   Psychiatric: He has a normal mood and affect. Judgment normal.         Results for orders placed or performed in visit on 06/24/20   Comprehensive Metabolic Panel   Result Value Ref Range    Glucose 132 (H) 65 - 99 mg/dL    BUN 21 8 - 23 mg/dL    Creatinine 1.90 (H) 0.76 - 1.27 mg/dL    Sodium 139 136 - 145 mmol/L    Potassium 4.7 3.5 - 5.2 mmol/L    Chloride 107 98 - 107 mmol/L    CO2 22.0 22.0 - 29.0 mmol/L    Calcium 9.5 8.6 - 10.5 mg/dL    Total Protein 7.0 6.0 - 8.5 g/dL    Albumin 4.40 3.50 - 5.20 g/dL    ALT (SGPT) 19 1 - 41 U/L    AST (SGOT) 12 1 - 40 U/L    Alkaline Phosphatase 92 39 - 117 U/L    Total Bilirubin 0.3 0.2 - 1.2 mg/dL    eGFR Non African Amer 36 (L) >60 mL/min/1.73    Globulin 2.6 gm/dL    A/G Ratio 1.7  g/dL    BUN/Creatinine Ratio 11.1 7.0 - 25.0    Anion Gap 10.0 5.0 - 15.0 mmol/L   Hemoglobin A1c   Result Value Ref Range    Hemoglobin A1C 5.90 (H) 4.80 - 5.60 %   Lipid Panel   Result Value Ref Range    Total Cholesterol 107 0 - 200 mg/dL    Triglycerides 125 0 - 150 mg/dL    HDL Cholesterol 21 (L) 40 - 60 mg/dL    LDL Cholesterol  61 0 - 100 mg/dL    VLDL Cholesterol 25 5 - 40 mg/dL    LDL/HDL Ratio 2.90    TSH   Result Value Ref Range    TSH 1.700 0.270 - 4.200 uIU/mL             Assessment/Plan   Diagnoses and all orders for this visit:    Gastroesophageal reflux disease with esophagitis  -     sucralfate (CARAFATE) 1 g tablet; Take 1 tablet by mouth 4 (Four) Times a Day.    Other chest pain  Comments:  s/p ROBERTO at [facility].  trial of carafate, continue protonix.  Orders:  -     sucralfate (CARAFATE) 1 g tablet; Take 1 tablet by mouth 4 (Four) Times a Day.    Cough due to ACE inhibitor  -     losartan (Cozaar) 25 MG tablet; Take 1 tablet by mouth Daily. Replaces lisinopril    Hypertension, unspecified type  -     losartan (Cozaar) 25 MG tablet; Take 1 tablet by mouth Daily. Replaces lisinopril      Will obtain ER notes and labs to review.       This visit has been rescheduled as a phone visit to comply with patient safety concerns in accordance with CDC recommendations. Total time of discussion was 22 minutes.      Return for Next scheduled follow up.

## 2020-07-06 ENCOUNTER — TELEPHONE (OUTPATIENT)
Dept: INTERNAL MEDICINE | Facility: CLINIC | Age: 60
End: 2020-07-06

## 2020-07-06 DIAGNOSIS — F41.9 ANXIETY: ICD-10-CM

## 2020-07-06 RX ORDER — BUSPIRONE HYDROCHLORIDE 15 MG/1
15 TABLET ORAL 3 TIMES DAILY
Qty: 90 TABLET | Refills: 5 | Status: SHIPPED | OUTPATIENT
Start: 2020-07-06 | End: 2020-10-07

## 2020-07-06 NOTE — TELEPHONE ENCOUNTER
Patient states that he was taken off of his Klonipin and he has had withdrawals, but was put on Buspirone and it is not helping as well.  He was wanting to see if his Buspirone can be increased.  He can be reached at [phone]

## 2020-07-22 ENCOUNTER — TELEPHONE (OUTPATIENT)
Dept: INTERNAL MEDICINE | Facility: CLINIC | Age: 60
End: 2020-07-22

## 2020-07-22 NOTE — TELEPHONE ENCOUNTER
Please let him know that his creatinine is definitely worse and he would benefit from seeing a nephrologist/kidney specialist.  Sugars are okay for it being nonfasting.  why was he at [facility]  and who did he do the labs for?    Re: the change in the color of his arm and leg swelling I will need to see him if they are persisting.  Or we can discuss them at his appointment on the seventh.

## 2020-07-22 NOTE — TELEPHONE ENCOUNTER
Patient stated he had labs done yesterday at [facility] on [address]. He received the lab results and is concerned about his levels that have increased/decreased. He also has other symptoms he wants to let Dr. Tello know.     Creatinine level is 2.12   EGFR NON  level is 32  Glucose level is 144    The top of his feet and ankles are swollen and turning purple. Right hand also swelling at times.     Please call patient and advise [phone].

## 2020-07-22 NOTE — TELEPHONE ENCOUNTER
Pt states he was seeing his nephrologist at [facility], and will hold off to discuss at his appt on the 7th.

## 2020-08-03 DIAGNOSIS — E03.9 ACQUIRED HYPOTHYROIDISM: ICD-10-CM

## 2020-08-03 DIAGNOSIS — J44.1 CHRONIC OBSTRUCTIVE PULMONARY DISEASE WITH ACUTE EXACERBATION (HCC): ICD-10-CM

## 2020-08-03 DIAGNOSIS — E78.2 MIXED HYPERLIPIDEMIA: ICD-10-CM

## 2020-08-03 DIAGNOSIS — G43.909 MIGRAINE WITHOUT STATUS MIGRAINOSUS, NOT INTRACTABLE, UNSPECIFIED MIGRAINE TYPE: ICD-10-CM

## 2020-08-03 RX ORDER — METOPROLOL SUCCINATE 50 MG/1
50 TABLET, EXTENDED RELEASE ORAL DAILY
Qty: 90 TABLET | Refills: 0 | Status: SHIPPED | OUTPATIENT
Start: 2020-08-03 | End: 2020-11-05

## 2020-08-03 RX ORDER — PANTOPRAZOLE SODIUM 40 MG/1
40 TABLET, DELAYED RELEASE ORAL DAILY
Qty: 90 TABLET | Refills: 0 | Status: SHIPPED | OUTPATIENT
Start: 2020-08-03 | End: 2020-11-05

## 2020-08-04 ENCOUNTER — TELEPHONE (OUTPATIENT)
Dept: INTERNAL MEDICINE | Facility: CLINIC | Age: 60
End: 2020-08-04

## 2020-08-04 RX ORDER — LEVOTHYROXINE SODIUM 88 UG/1
TABLET ORAL
Qty: 90 TABLET | Refills: 1 | Status: SHIPPED | OUTPATIENT
Start: 2020-08-04 | End: 2021-04-30 | Stop reason: SDUPTHER

## 2020-08-04 RX ORDER — ATORVASTATIN CALCIUM 20 MG/1
TABLET, FILM COATED ORAL
Qty: 90 TABLET | Refills: 1 | Status: SHIPPED | OUTPATIENT
Start: 2020-08-04 | End: 2021-04-30 | Stop reason: SDUPTHER

## 2020-08-04 RX ORDER — TOPIRAMATE 50 MG/1
100 TABLET, FILM COATED ORAL
Qty: 180 TABLET | Refills: 1 | Status: SHIPPED | OUTPATIENT
Start: 2020-08-04 | End: 2021-04-30 | Stop reason: SDUPTHER

## 2020-08-04 RX ORDER — IPRATROPIUM/ALBUTEROL SULFATE 20-100 MCG
MIST INHALER (GRAM) INHALATION
Qty: 12 G | Refills: 1 | Status: SHIPPED | OUTPATIENT
Start: 2020-08-04 | End: 2021-05-05 | Stop reason: SDUPTHER

## 2020-08-04 NOTE — TELEPHONE ENCOUNTER
PT CALLED AND SAID HE DIDN'T KNOW IF HE WAS RUNNING A FEVER AS HE DIDN'T GO TO THE DR, AND HE WAS COUGHING ON THE PHONE, AND I ASKED IF THIS WAS RELATED TO HIS COPD.he SAID HE DIDN'T KNOW...AND HE WAS ALSO HAVING WITHDRAWAL FROM KLONOPIN; PLEASE ADVISE    CINDY: [phone]

## 2020-08-04 NOTE — TELEPHONE ENCOUNTER
He is maxed out on the buspar.  He should try to increase his hydroxyzine to 2 every 6-8 hrs to see if helps.    Thanks

## 2020-08-04 NOTE — TELEPHONE ENCOUNTER
Pt states that he called to let you know that the buspar is really not strong enough, he is really on edge. Can his dose be increased?  He did not call about the cough. Pt states he will be here for his appt on Friday.

## 2020-08-04 NOTE — TELEPHONE ENCOUNTER
Pt states he has not been taking and does not have, advised him to call his pharmacy and have script filled, stated verbal understanding.

## 2020-08-05 ENCOUNTER — TELEPHONE (OUTPATIENT)
Dept: INTERNAL MEDICINE | Facility: CLINIC | Age: 60
End: 2020-08-05

## 2020-08-05 DIAGNOSIS — R05.8 COUGH DUE TO ACE INHIBITOR: ICD-10-CM

## 2020-08-05 DIAGNOSIS — T46.4X5A COUGH DUE TO ACE INHIBITOR: ICD-10-CM

## 2020-08-05 DIAGNOSIS — I10 HYPERTENSION, UNSPECIFIED TYPE: ICD-10-CM

## 2020-08-05 RX ORDER — LOSARTAN POTASSIUM 25 MG/1
25 TABLET ORAL DAILY
Qty: 90 TABLET | Refills: 1 | Status: SHIPPED | OUTPATIENT
Start: 2020-08-05 | End: 2020-08-20 | Stop reason: SDUPTHER

## 2020-08-05 NOTE — TELEPHONE ENCOUNTER
Patient called and stated that he picked up his medications from the pharmacy this morning. The patient states the pharmacy is going to be faxing over a request but he wanted to call as well regarding his losartan (Cozaar) 25 MG tablet. The patient states he received a 30 day supply but wanted a 90 day supply. The patient would like to know if the other 60 tablets could be called in. Please advise.     Pharmacy is [facility] on [facility]     Patient call back [phone]

## 2020-08-06 ENCOUNTER — TELEPHONE (OUTPATIENT)
Dept: INTERNAL MEDICINE | Facility: CLINIC | Age: 60
End: 2020-08-06

## 2020-08-06 NOTE — TELEPHONE ENCOUNTER
Gael check if he can do telephone visit instead, as I wanted to discuss other options for his anxiety as buspar was not working.    thanks

## 2020-08-06 NOTE — TELEPHONE ENCOUNTER
PATIENT STATES HE DOES NOT WANT TO COME IN TO THE APPOINTMENT TOMORROW  BECAUSE HE JUST PICKED UP THE  hydrOXYzine (ATARAX) 25 MG tablet  YESTERDAY AND HE HAS ONLY BEEN ON IT 2 DAYS    PLEASE CALL IF YOU DON'T NEED TO SEE HIM AT THIS TIME AND CANCEL HIS APPOINTMENT       PATIENT CALL BACK  [phone]

## 2020-08-07 ENCOUNTER — OFFICE VISIT (OUTPATIENT)
Dept: INTERNAL MEDICINE | Facility: CLINIC | Age: 60
End: 2020-08-07

## 2020-08-07 DIAGNOSIS — F41.9 ANXIETY: Primary | ICD-10-CM

## 2020-08-07 PROCEDURE — 99442 PR PHYS/QHP TELEPHONE EVALUATION 11-20 MIN: CPT | Performed by: INTERNAL MEDICINE

## 2020-08-07 RX ORDER — BUPROPION HYDROCHLORIDE 150 MG/1
150 TABLET ORAL DAILY
Qty: 7 TABLET | Refills: 0 | Status: SHIPPED | OUTPATIENT
Start: 2020-08-07 | End: 2020-10-07

## 2020-08-07 RX ORDER — BUPROPION HYDROCHLORIDE 300 MG/1
300 TABLET ORAL DAILY
Qty: 30 TABLET | Refills: 5 | Status: SHIPPED | OUTPATIENT
Start: 2020-08-07 | End: 2020-10-07

## 2020-08-07 NOTE — PROGRESS NOTES
Hypertension (Pt read his med list, marked ones he didn't say as not taking to be looked over) and Diarrhea    Subjective   Carmelo Saucedo is a 59 y.o. male is here today for follow-up.    History of Present Illness   Taking only the 40 mg Prozac, buspar not helping.  Weaning off the clonazepam.  Last night had diarrhea and vomiting.        Current Outpatient Medications:   •  acyclovir (ZOVIRAX) 400 MG tablet, TAKE 1 TABLET BY MOUTH TWICE DAILY, Disp: 60 tablet, Rfl: 0  •  aspirin 81 MG chewable tablet, Chew 81 mg Daily., Disp: , Rfl:   •  atorvastatin (LIPITOR) 20 MG tablet, TAKE 1 TABLET BY MOUTH AT BEDTIME, Disp: 90 tablet, Rfl: 1  •  busPIRone (BUSPAR) 15 MG tablet, Take 1 tablet by mouth 3 (Three) Times a Day., Disp: 90 tablet, Rfl: 5  •  Cholecalciferol (VITAMIN D3) 125 MCG (5000 UT) capsule capsule, Take 1 capsule by mouth Daily., Disp: 30 capsule, Rfl: 5  •  COMBIVENT RESPIMAT  MCG/ACT inhaler, INHALE 1 PUFF BY MOUTH FOUR TIMES A DAY., Disp: 12 g, Rfl: 1  •  FLUoxetine (PROzac) 40 MG capsule, Take 2 capsules by mouth Daily. (Patient taking differently: Take 40 mg by mouth Daily.), Disp: 180 capsule, Rfl: 1  •  glucose blood test strip, For daily testing with Freestyle meter., Disp: 100 each, Rfl: 3  •  HYDROcodone-acetaminophen (NORCO) 7.5-325 MG per tablet, Take 1 tablet by mouth 3 (Three) Times a Day., Disp: , Rfl: 0  •  hydrOXYzine (ATARAX) 25 MG tablet, Take 1 tablet by mouth 3 (Three) Times a Day As Needed for Anxiety. Take instead of klonopin, Disp: 90 tablet, Rfl: 3  •  Insulin Glargine (LANTUS SOLOSTAR) 100 UNIT/ML injection pen, Inject 10 Units under the skin into the appropriate area as directed Every Night., Disp: 5 pen, Rfl: 1  •  Insulin Pen Needle (PEN NEEDLES) 32G X 5 MM misc, 1 each Every Night., Disp: 50 each, Rfl: 5  •  ipratropium-albuterol (DUONEB) 0.5-2.5 mg/3 ml nebulizer, Take 3 mL by nebulization Every 6 (Six) Hours., Disp: 120 vial, Rfl: 3  •  Lancets (FREESTYLE) lancets, FOR ONCE  DAILY TESTING, Disp: 100 each, Rfl: 3  •  levothyroxine (SYNTHROID, LEVOTHROID) 88 MCG tablet, TAKE 1 TABLET BY MOUTH EVERY MORNING ON AN EMPTY STOMACH, Disp: 90 tablet, Rfl: 1  •  losartan (Cozaar) 25 MG tablet, Take 1 tablet by mouth Daily. Replaces lisinopril, Disp: 90 tablet, Rfl: 1  •  metoprolol succinate XL (TOPROL-XL) 50 MG 24 hr tablet, TAKE 1 TABLET BY MOUTH DAILY, Disp: 90 tablet, Rfl: 0  •  nitroglycerin (Nitrostat) 0.4 MG SL tablet, Place 1 tablet under the tongue Every 5 (Five) Minutes As Needed for Chest Pain. Only take up to 3 tablets. Call 911 if pain persists., Disp: 30 tablet, Rfl: 0  •  oxybutynin XL (DITROPAN-XL) 5 MG 24 hr tablet, Take 1 tablet by mouth Daily., Disp: 90 tablet, Rfl: 1  •  pantoprazole (PROTONIX) 40 MG EC tablet, TAKE 1 TABLET BY MOUTH DAILY, Disp: 90 tablet, Rfl: 0  •  polyethylene glycol (GlycoLax) 17 GM/SCOOP powder, Take 17 g by mouth Daily., Disp: 500 g, Rfl: 1  •  QUEtiapine (SEROquel) 400 MG tablet, Take 2 tablets nightly (Patient taking differently: 200 mg. Take 2 tablets nightly), Disp: 180 tablet, Rfl: 1  •  tamsulosin (FLOMAX) 0.4 MG capsule 24 hr capsule, Take 1 capsule by mouth every night at bedtime. (Patient taking differently: Take 2 capsules by mouth every night at bedtime.), Disp: 90 capsule, Rfl: 1  •  topiramate (TOPAMAX) 50 MG tablet, TAKE 2 TABLETS BY MOUTH EVERY NIGHT AT BEDTIME, Disp: 180 tablet, Rfl: 1  •  TRADJENTA 5 MG tablet tablet, TAKE 1 TABLET BY MOUTH EVERY DAY, Disp: 90 tablet, Rfl: 0  •  traZODone (DESYREL) 100 MG tablet, Take 2 tablets by mouth Every Night., Disp: 180 tablet, Rfl: 1  •  buPROPion XL (Wellbutrin XL) 150 MG 24 hr tablet, Take 1 tablet by mouth Daily. Replaces buspar, Disp: 7 tablet, Rfl: 0  •  buPROPion XL (Wellbutrin XL) 300 MG 24 hr tablet, Take 1 tablet by mouth Daily. Start after the 150 mg. Replaces buspar, Disp: 30 tablet, Rfl: 5  •  finasteride (PROSCAR) 5 MG tablet, Take 1 tablet by mouth Daily., Disp: 30 tablet, Rfl:  2  •  hydrochlorothiazide (MICROZIDE) 12.5 MG capsule, take 1 capsule by mouth every morning, Disp: 90 capsule, Rfl: 1  •  sucralfate (CARAFATE) 1 g tablet, Take 1 tablet by mouth 4 (Four) Times a Day., Disp: 60 tablet, Rfl: 0      The following portions of the patient's history were reviewed and updated as appropriate: allergies, current medications, past family history, past medical history, past social history, past surgical history and problem list.    Review of Systems   Constitutional: Negative.  Negative for chills and fever.   HENT: Negative for ear discharge, ear pain, sinus pressure and sore throat.    Respiratory: Negative for cough, chest tightness and shortness of breath.    Cardiovascular: Negative for chest pain, palpitations and leg swelling.   Gastrointestinal: Positive for diarrhea, nausea and vomiting.   Musculoskeletal: Negative for arthralgias, back pain and myalgias.   Neurological: Negative for dizziness, syncope and headaches.   Psychiatric/Behavioral: Positive for sleep disturbance. Negative for confusion. The patient is nervous/anxious.        Objective   There were no vitals taken for this visit.  Physical Exam   Constitutional: He is oriented to person, place, and time.   Pulmonary/Chest: Effort normal. No respiratory distress.   Neurological: He is alert and oriented to person, place, and time.   Speech wnl   Psychiatric: He has a normal mood and affect. Judgment normal.         Results for orders placed or performed in visit on 06/24/20   Comprehensive Metabolic Panel   Result Value Ref Range    Glucose 132 (H) 65 - 99 mg/dL    BUN 21 8 - 23 mg/dL    Creatinine 1.90 (H) 0.76 - 1.27 mg/dL    Sodium 139 136 - 145 mmol/L    Potassium 4.7 3.5 - 5.2 mmol/L    Chloride 107 98 - 107 mmol/L    CO2 22.0 22.0 - 29.0 mmol/L    Calcium 9.5 8.6 - 10.5 mg/dL    Total Protein 7.0 6.0 - 8.5 g/dL    Albumin 4.40 3.50 - 5.20 g/dL    ALT (SGPT) 19 1 - 41 U/L    AST (SGOT) 12 1 - 40 U/L    Alkaline  Phosphatase 92 39 - 117 U/L    Total Bilirubin 0.3 0.2 - 1.2 mg/dL    eGFR Non African Amer 36 (L) >60 mL/min/1.73    Globulin 2.6 gm/dL    A/G Ratio 1.7 g/dL    BUN/Creatinine Ratio 11.1 7.0 - 25.0    Anion Gap 10.0 5.0 - 15.0 mmol/L   Hemoglobin A1c   Result Value Ref Range    Hemoglobin A1C 5.90 (H) 4.80 - 5.60 %   Lipid Panel   Result Value Ref Range    Total Cholesterol 107 0 - 200 mg/dL    Triglycerides 125 0 - 150 mg/dL    HDL Cholesterol 21 (L) 40 - 60 mg/dL    LDL Cholesterol  61 0 - 100 mg/dL    VLDL Cholesterol 25 5 - 40 mg/dL    LDL/HDL Ratio 2.90    TSH   Result Value Ref Range    TSH 1.700 0.270 - 4.200 uIU/mL             Assessment/Plan   Diagnoses and all orders for this visit:    Anxiety  -     buPROPion XL (Wellbutrin XL) 150 MG 24 hr tablet; Take 1 tablet by mouth Daily. Replaces buspar  -     buPROPion XL (Wellbutrin XL) 300 MG 24 hr tablet; Take 1 tablet by mouth Daily. Start after the 150 mg. Replaces buspar             This visit has been rescheduled as a phone visit to comply with patient safety concerns in accordance with CDC recommendations. Total time of discussion was 18 minutes.      You have chosen to receive care through a telephone visit. Do you consent to use a telephone visit for your medical care today? Yes    Return in about 2 months (around 10/7/2020) for Next scheduled follow up.

## 2020-08-14 DIAGNOSIS — B00.9 HSV-2 INFECTION: ICD-10-CM

## 2020-08-14 RX ORDER — ACYCLOVIR 400 MG/1
TABLET ORAL
Qty: 60 TABLET | Refills: 0 | Status: SHIPPED | OUTPATIENT
Start: 2020-08-14 | End: 2020-09-01

## 2020-08-19 ENCOUNTER — TELEPHONE (OUTPATIENT)
Dept: INTERNAL MEDICINE | Facility: CLINIC | Age: 60
End: 2020-08-19

## 2020-08-19 NOTE — TELEPHONE ENCOUNTER
"Pt thinks that one on these medications is causing the edema he is now having.     Losartan 25mg 1 po qd  Hydroxyzine 25mg 1 po tid  Wellbutrin XL 300mg      Pt also states that he wishes to be on one BP medication and not 2, he has been on one in the past and requests to be on again, as this cost him too much money.     Pt went on to tell me that he had me on speaker phone so his wife would hear the conversation because mistakes otherwise and get mixed up.  I advised him I was taking the message give to Dr Tello, and I wasn't going to make a mistake.  Pt went on to call me names and argue with me.  I nicely let him know that I was taking his message and sending to Dr Tello, not leaving anything out.  He \"thanked\" me and we ended the call.      Would you like to have pt sched appt?    "

## 2020-08-19 NOTE — TELEPHONE ENCOUNTER
PATIENT CALLING IN TO REPORT RIGHT HAND AND RIGHT ANKLE ARE SWOLLEN AND PAINFUL.      HE WOULD LIKE TO DISCUSS THE NEW MEDICATIONS THAT HE IS TAKING       PLEASE CALL AND ADVISE -[phone]

## 2020-08-20 ENCOUNTER — TELEPHONE (OUTPATIENT)
Dept: INTERNAL MEDICINE | Facility: CLINIC | Age: 60
End: 2020-08-20

## 2020-08-20 DIAGNOSIS — R05.8 COUGH DUE TO ACE INHIBITOR: ICD-10-CM

## 2020-08-20 DIAGNOSIS — T46.4X5A COUGH DUE TO ACE INHIBITOR: ICD-10-CM

## 2020-08-20 DIAGNOSIS — I10 HYPERTENSION, UNSPECIFIED TYPE: ICD-10-CM

## 2020-08-20 RX ORDER — LOSARTAN POTASSIUM 25 MG/1
25 TABLET ORAL DAILY
Qty: 90 TABLET | Refills: 0 | Status: SHIPPED | OUTPATIENT
Start: 2020-08-20 | End: 2020-11-12 | Stop reason: SDUPTHER

## 2020-08-20 NOTE — TELEPHONE ENCOUNTER
PATIENT CALLED REQUESTING A REFILL FOR     losartan (Cozaar) 25 MG tablet    HE HAS LOST THIS RX AND WILL HAVE TO OVERRIDE    PHARMACY    [id number] - [address] AT [facility] & [facility] [phone] [facility] [phone] FX       PATIENT CALL BACK    [phone]

## 2020-08-21 DIAGNOSIS — E11.69 TYPE 2 DIABETES MELLITUS WITH OTHER SPECIFIED COMPLICATION, WITHOUT LONG-TERM CURRENT USE OF INSULIN (HCC): ICD-10-CM

## 2020-08-21 RX ORDER — LINAGLIPTIN 5 MG/1
TABLET, FILM COATED ORAL
Qty: 90 TABLET | Refills: 0 | Status: SHIPPED | OUTPATIENT
Start: 2020-08-21 | End: 2020-09-14

## 2020-08-21 NOTE — TELEPHONE ENCOUNTER
PATIENT CALLED AGAIN TODAY 8/21 AND IS SOMEWHAT FRUSTRATED THAT NO ONE CALLED HIM BACK ABOUT THIS ISSUE YESTERDAY.      PLEASE CONTACT PATIENT ASAP TO DISCUSS HIS BLOOD PRESSURE MEDICATIONS.    CALLBACK:  [phone]

## 2020-08-21 NOTE — TELEPHONE ENCOUNTER
Sorry for the delay !    Losartan is for his diabetes, not just blood pressure.  Toprol is for palpitations and heart, but if expensive- can change to immediate release, which would be cheaper.  None of these meds should cause swelling. He should be restricting his sodium, and keep legs elevated if persistent.    thanks

## 2020-08-22 DIAGNOSIS — I10 ESSENTIAL HYPERTENSION: ICD-10-CM

## 2020-08-22 RX ORDER — HYDROCHLOROTHIAZIDE 12.5 MG/1
12.5 CAPSULE, GELATIN COATED ORAL EVERY MORNING
Qty: 90 CAPSULE | Refills: 1 | Status: SHIPPED | OUTPATIENT
Start: 2020-08-22 | End: 2021-08-23

## 2020-09-01 DIAGNOSIS — B00.9 HSV-2 INFECTION: ICD-10-CM

## 2020-09-01 RX ORDER — ACYCLOVIR 400 MG/1
TABLET ORAL
Qty: 180 TABLET | Refills: 1 | Status: SHIPPED | OUTPATIENT
Start: 2020-09-01 | End: 2021-11-19 | Stop reason: SDUPTHER

## 2020-09-14 ENCOUNTER — TELEPHONE (OUTPATIENT)
Dept: INTERNAL MEDICINE | Facility: CLINIC | Age: 60
End: 2020-09-14

## 2020-09-14 DIAGNOSIS — E11.69 TYPE 2 DIABETES MELLITUS WITH OTHER SPECIFIED COMPLICATION, WITHOUT LONG-TERM CURRENT USE OF INSULIN (HCC): ICD-10-CM

## 2020-09-14 NOTE — TELEPHONE ENCOUNTER
PATIENT CALLED AND WANTED TO KNOW IF HE COULD START TAKING METFORMIN AGAIN; THE CURRENT MEDICATION HE IS TAKING IS CAUSING HIM PANCREATIS  TO FLARE; PT ALSO HAS AN ISSUE WITH JOCK ITCH AND WANTS TO KNOW IF SOMETHING CAN BE CALLED IN FOR THAT; PT ALSO ASKED IF HE COULD HAVE SOMETHING FOR POSSIBLE FOR HIS HEAD.he SAID IT IS ITCHING POSSIBLY DUE TO FLEA BITES    PLEASE ADVISE  CINDY: [phone]    [facility]

## 2020-09-14 NOTE — TELEPHONE ENCOUNTER
Pt states that the tradjenta is causing this (pancreatitis) and has dc'ed for the past week and is getting better.  Has tried yeast creams on creases and it is not helping.  Also has fleas in the house, now has bites in his hair line that are bothering him.  Please advise.

## 2020-09-15 NOTE — TELEPHONE ENCOUNTER
Unfortunately because of his kidneys, he is no longer a candidate for metformin.  He should just increase his insulin to 14 units and if sugars stay > 150, go up to 16 units nightly.    Re: his Jock itch and flea bites- he will need to be seen to determine what kidn of rash it is.    thanks

## 2020-10-05 ENCOUNTER — TELEPHONE (OUTPATIENT)
Dept: INTERNAL MEDICINE | Facility: CLINIC | Age: 60
End: 2020-10-05

## 2020-10-05 NOTE — TELEPHONE ENCOUNTER
Pt has a telephone appointment on Wednesday and would like to go to [facility] today to get his labs done so that you have the results before his appointment. Can you please order those labs and inform that patient when they are entered in Epic?

## 2020-10-06 ENCOUNTER — LAB (OUTPATIENT)
Dept: LAB | Facility: HOSPITAL | Age: 60
End: 2020-10-06

## 2020-10-06 DIAGNOSIS — E11.69 TYPE 2 DIABETES MELLITUS WITH OTHER SPECIFIED COMPLICATION, WITHOUT LONG-TERM CURRENT USE OF INSULIN (HCC): ICD-10-CM

## 2020-10-06 DIAGNOSIS — I10 ESSENTIAL HYPERTENSION: ICD-10-CM

## 2020-10-06 LAB — HBA1C MFR BLD: 5.8 % (ref 4.8–5.6)

## 2020-10-06 PROCEDURE — 83036 HEMOGLOBIN GLYCOSYLATED A1C: CPT

## 2020-10-06 PROCEDURE — 80053 COMPREHEN METABOLIC PANEL: CPT

## 2020-10-07 ENCOUNTER — OFFICE VISIT (OUTPATIENT)
Dept: INTERNAL MEDICINE | Facility: CLINIC | Age: 60
End: 2020-10-07

## 2020-10-07 ENCOUNTER — TELEPHONE (OUTPATIENT)
Dept: INTERNAL MEDICINE | Facility: CLINIC | Age: 60
End: 2020-10-07

## 2020-10-07 DIAGNOSIS — L65.9 ALOPECIA: ICD-10-CM

## 2020-10-07 DIAGNOSIS — Z87.891 HISTORY OF TOBACCO USE, PRESENTING HAZARDS TO HEALTH: ICD-10-CM

## 2020-10-07 DIAGNOSIS — E11.69 TYPE 2 DIABETES MELLITUS WITH OTHER SPECIFIED COMPLICATION, WITHOUT LONG-TERM CURRENT USE OF INSULIN (HCC): Primary | ICD-10-CM

## 2020-10-07 DIAGNOSIS — I10 ESSENTIAL HYPERTENSION: ICD-10-CM

## 2020-10-07 DIAGNOSIS — E11.40 TYPE 2 DIABETES MELLITUS WITH DIABETIC NEUROPATHY, WITHOUT LONG-TERM CURRENT USE OF INSULIN (HCC): ICD-10-CM

## 2020-10-07 DIAGNOSIS — F25.0 SCHIZOAFFECTIVE DISORDER, BIPOLAR TYPE (HCC): ICD-10-CM

## 2020-10-07 LAB
ALBUMIN SERPL-MCNC: 4.4 G/DL (ref 3.5–5.2)
ALBUMIN/GLOB SERPL: 1.6 G/DL
ALP SERPL-CCNC: 99 U/L (ref 39–117)
ALT SERPL W P-5'-P-CCNC: 28 U/L (ref 1–41)
ANION GAP SERPL CALCULATED.3IONS-SCNC: 10.4 MMOL/L (ref 5–15)
AST SERPL-CCNC: 23 U/L (ref 1–40)
BILIRUB SERPL-MCNC: 0.2 MG/DL (ref 0–1.2)
BUN SERPL-MCNC: 28 MG/DL (ref 8–23)
BUN/CREAT SERPL: 15.1 (ref 7–25)
CALCIUM SPEC-SCNC: 9.6 MG/DL (ref 8.6–10.5)
CHLORIDE SERPL-SCNC: 105 MMOL/L (ref 98–107)
CO2 SERPL-SCNC: 23.6 MMOL/L (ref 22–29)
CREAT SERPL-MCNC: 1.86 MG/DL (ref 0.76–1.27)
GFR SERPL CREATININE-BSD FRML MDRD: 37 ML/MIN/1.73
GLOBULIN UR ELPH-MCNC: 2.7 GM/DL
GLUCOSE SERPL-MCNC: 123 MG/DL (ref 65–99)
POTASSIUM SERPL-SCNC: 3.7 MMOL/L (ref 3.5–5.2)
PROT SERPL-MCNC: 7.1 G/DL (ref 6–8.5)
SODIUM SERPL-SCNC: 139 MMOL/L (ref 136–145)

## 2020-10-07 PROCEDURE — 99443 PR PHYS/QHP TELEPHONE EVALUATION 21-30 MIN: CPT | Performed by: INTERNAL MEDICINE

## 2020-10-07 RX ORDER — TRAZODONE HYDROCHLORIDE 100 MG/1
200 TABLET ORAL NIGHTLY
Qty: 180 TABLET | Refills: 1 | Status: SHIPPED | OUTPATIENT
Start: 2020-10-07 | End: 2020-10-08

## 2020-10-07 RX ORDER — ANTIARTHRITIC COMBINATION NO.2 900 MG
1 TABLET ORAL DAILY
Qty: 100 TABLET | Refills: 1 | Status: SHIPPED | OUTPATIENT
Start: 2020-10-07 | End: 2021-03-24

## 2020-10-07 RX ORDER — ASPIRIN 81 MG
TABLET,CHEWABLE ORAL
Qty: 60 G | Refills: 5 | Status: SHIPPED | OUTPATIENT
Start: 2020-10-07 | End: 2021-03-24

## 2020-10-07 NOTE — PROGRESS NOTES
Please rescheduled his wellness in Jan to the end of November per Dr Tello's message below:       Please schedule wellness end of Nov. Use 2x 15 min appt.   thanks

## 2020-10-07 NOTE — TELEPHONE ENCOUNTER
Caller: Carmelo Saucedo    Relationship: Self    Best call back number: [phone]    What medication are you requesting:GABAPENTIN     What are your current symptoms: FEET BURN AND IN PAIN, CAN HARDLY WALK    How long have you been experiencing symptoms: FOR A LONG TIME    Have you had these symptoms before:    [x] Yes  [] No    Have you been treated for these symptoms before:   [x] Yes  [] No    If a prescription is needed, what is your preferred pharmacy and phone number: [id number] - [address] AT [facility] & [facility] [facility] [phone] [facility] [phone]      Additional notes:PT STATES THAT HE HAS TAKEN BEFORE AND HE IS IN SO MUCH PAIN THAT HE WILL TAKE IT AGAIN

## 2020-10-07 NOTE — PROGRESS NOTES
Anxiety (doing OK, has dc'ed many meds), Results (would like to review labs from yesterday), discuss DM shoes (pt has pain and burning in feet), and Nicotine Dependence (now has pain in back and would like a cxr)    Subjective   Carmelo Saucedo is a 60 y.o. male is here today for follow-up.    History of Present Illness   Here for a f/u on his anxiety , insomnia and T2DM.  C/o burning and pain in his feet.  No rash or skin peeling  Has a deep pain in his bones, coughing more, with trouble breathing.  Also to get diabetic shoes.  Anxiety is persisting, and buspar made things worse.      Current Outpatient Medications:   •  acyclovir (ZOVIRAX) 400 MG tablet, TAKE 1 TABLET BY MOUTH TWICE DAILY, Disp: 180 tablet, Rfl: 1  •  aspirin 81 MG chewable tablet, Chew 162 mg Daily., Disp: , Rfl:   •  atorvastatin (LIPITOR) 20 MG tablet, TAKE 1 TABLET BY MOUTH AT BEDTIME, Disp: 90 tablet, Rfl: 1  •  Cholecalciferol (VITAMIN D3) 125 MCG (5000 UT) capsule capsule, Take 1 capsule by mouth Daily., Disp: 30 capsule, Rfl: 5  •  COMBIVENT RESPIMAT  MCG/ACT inhaler, INHALE 1 PUFF BY MOUTH FOUR TIMES A DAY., Disp: 12 g, Rfl: 1  •  glucose blood test strip, For daily testing with Freestyle meter., Disp: 100 each, Rfl: 3  •  hydroCHLOROthiazide (MICROZIDE) 12.5 MG capsule, Take 1 capsule by mouth Every Morning., Disp: 90 capsule, Rfl: 1  •  HYDROcodone-acetaminophen (NORCO) 7.5-325 MG per tablet, Take 1 tablet by mouth 3 (Three) Times a Day., Disp: , Rfl: 0  •  Insulin Glargine (LANTUS SOLOSTAR) 100 UNIT/ML injection pen, Inject 16 Units under the skin into the appropriate area as directed Every Night., Disp: 5 pen, Rfl: 1  •  Insulin Pen Needle (PEN NEEDLES) 32G X 5 MM misc, 1 each Every Night., Disp: 50 each, Rfl: 5  •  ipratropium-albuterol (DUONEB) 0.5-2.5 mg/3 ml nebulizer, Take 3 mL by nebulization Every 6 (Six) Hours., Disp: 120 vial, Rfl: 3  •  Lancets (FREESTYLE) lancets, FOR ONCE DAILY TESTING, Disp: 100 each, Rfl: 3  •   levothyroxine (SYNTHROID, LEVOTHROID) 88 MCG tablet, TAKE 1 TABLET BY MOUTH EVERY MORNING ON AN EMPTY STOMACH, Disp: 90 tablet, Rfl: 1  •  losartan (Cozaar) 25 MG tablet, Take 1 tablet by mouth Daily. Replaces lisinopril, Disp: 90 tablet, Rfl: 0  •  metoprolol succinate XL (TOPROL-XL) 50 MG 24 hr tablet, TAKE 1 TABLET BY MOUTH DAILY, Disp: 90 tablet, Rfl: 0  •  nitroglycerin (Nitrostat) 0.4 MG SL tablet, Place 1 tablet under the tongue Every 5 (Five) Minutes As Needed for Chest Pain. Only take up to 3 tablets. Call 911 if pain persists., Disp: 30 tablet, Rfl: 0  •  pantoprazole (PROTONIX) 40 MG EC tablet, TAKE 1 TABLET BY MOUTH DAILY, Disp: 90 tablet, Rfl: 0  •  polyethylene glycol (GlycoLax) 17 GM/SCOOP powder, Take 17 g by mouth Daily., Disp: 500 g, Rfl: 1  •  tamsulosin (FLOMAX) 0.4 MG capsule 24 hr capsule, Take 1 capsule by mouth every night at bedtime. (Patient taking differently: Take 2 capsules by mouth every night at bedtime.), Disp: 90 capsule, Rfl: 1  •  topiramate (TOPAMAX) 50 MG tablet, TAKE 2 TABLETS BY MOUTH EVERY NIGHT AT BEDTIME (Patient taking differently: Take 50 mg by mouth every night at bedtime.), Disp: 180 tablet, Rfl: 1  •  traZODone (DESYREL) 50 MG tablet, Take 1-2 tablets by mouth Every Night., Disp: 60 tablet, Rfl: 5  •  Biotin 5000 MCG tablet, Take 1 each by mouth Daily., Disp: 100 tablet, Rfl: 1  •  Capsaicin 0.1 % cream, Apply topically QHS to feet, wear gloves., Disp: 60 g, Rfl: 5  •  gabapentin (NEURONTIN) 300 MG capsule, Take 1 capsule by mouth Every Night., Disp: 30 capsule, Rfl: 1      The following portions of the patient's history were reviewed and updated as appropriate: allergies, current medications, past family history, past medical history, past social history, past surgical history and problem list.    Review of Systems   Constitutional: Positive for activity change and appetite change. Negative for chills and fever.   HENT: Negative for ear discharge, ear pain, sinus  pressure and sore throat.    Respiratory: Positive for shortness of breath. Negative for cough and chest tightness.    Cardiovascular: Negative for chest pain, palpitations and leg swelling.   Gastrointestinal: Negative for diarrhea, nausea and vomiting.   Musculoskeletal: Positive for arthralgias and myalgias. Negative for back pain.   Neurological: Positive for numbness. Negative for dizziness, syncope and headaches.   Psychiatric/Behavioral: Positive for sleep disturbance. Negative for confusion. The patient is nervous/anxious.        Objective   There were no vitals taken for this visit.  Physical Exam  Pulmonary:      Effort: Pulmonary effort is normal. No respiratory distress.   Neurological:      Mental Status: He is alert and oriented to person, place, and time.      Comments: Speech wnl   Psychiatric:         Judgment: Judgment normal.           Results for orders placed or performed in visit on 10/06/20   Comprehensive Metabolic Panel    Specimen: Blood   Result Value Ref Range    Glucose 123 (H) 65 - 99 mg/dL    BUN 28 (H) 8 - 23 mg/dL    Creatinine 1.86 (H) 0.76 - 1.27 mg/dL    Sodium 139 136 - 145 mmol/L    Potassium 3.7 3.5 - 5.2 mmol/L    Chloride 105 98 - 107 mmol/L    CO2 23.6 22.0 - 29.0 mmol/L    Calcium 9.6 8.6 - 10.5 mg/dL    Total Protein 7.1 6.0 - 8.5 g/dL    Albumin 4.40 3.50 - 5.20 g/dL    ALT (SGPT) 28 1 - 41 U/L    AST (SGOT) 23 1 - 40 U/L    Alkaline Phosphatase 99 39 - 117 U/L    Total Bilirubin 0.2 0.0 - 1.2 mg/dL    eGFR Non African Amer 37 (L) >60 mL/min/1.73    Globulin 2.7 gm/dL    A/G Ratio 1.6 g/dL    BUN/Creatinine Ratio 15.1 7.0 - 25.0    Anion Gap 10.4 5.0 - 15.0 mmol/L   Hemoglobin A1c    Specimen: Blood   Result Value Ref Range    Hemoglobin A1C 5.80 (H) 4.80 - 5.60 %             Assessment/Plan   Diagnoses and all orders for this visit:    Type 2 diabetes mellitus with other specified complication, without long-term current use of insulin (CMS/[facility])  -     Hemoglobin A1c;  Future    Essential hypertension  -     Comprehensive Metabolic Panel; Future    Schizoaffective disorder, bipolar type (CMS/[facility])  -     Discontinue: traZODone (DESYREL) 100 MG tablet; Take 2 tablets by mouth Every Night.  -     traZODone (DESYREL) 50 MG tablet; Take 1-2 tablets by mouth Every Night.    History of tobacco use, presenting hazards to health  -     CT Chest Low Dose Wo; Future    Type 2 diabetes mellitus with diabetic neuropathy, without long-term current use of insulin (CMS/[facility])  -     Capsaicin 0.1 % cream; Apply topically QHS to feet, wear gloves.  -     gabapentin (NEURONTIN) 300 MG capsule; Take 1 capsule by mouth Every Night.    Alopecia  -     Biotin 5000 MCG tablet; Take 1 each by mouth Daily.    reviewed labs, commended on his sugars and kidney function.    Use cortisone cream for itching.  Start biotin supplements       This visit has been rescheduled as a phone visit to comply with patient safety concerns in accordance with CDC recommendations. Total time of discussion was 25 minutes.        Return in about 6 weeks (around 11/18/2020) for Medicare Wellness.

## 2020-10-08 ENCOUNTER — TELEPHONE (OUTPATIENT)
Dept: INTERNAL MEDICINE | Facility: CLINIC | Age: 60
End: 2020-10-08

## 2020-10-08 RX ORDER — GABAPENTIN 300 MG/1
300 CAPSULE ORAL NIGHTLY
Qty: 30 CAPSULE | Refills: 1 | Status: SHIPPED | OUTPATIENT
Start: 2020-10-08 | End: 2021-03-24

## 2020-10-08 RX ORDER — TRAZODONE HYDROCHLORIDE 50 MG/1
50-100 TABLET ORAL NIGHTLY
Qty: 60 TABLET | Refills: 5 | Status: SHIPPED | OUTPATIENT
Start: 2020-10-08 | End: 2021-03-24 | Stop reason: DRUGHIGH

## 2020-10-08 NOTE — TELEPHONE ENCOUNTER
PATIENT CALLED AND STATED HE DISCUSSED WITH THE DOCTOR ON PHONE VISIT THAT THE TRAZADONE 100 MG tablets WERE TOO MUCH AND PATIENT STATED HE'S BEEN FINE WITH 50 MG.    PATIENT HAD THOUGHT HIS NEW PRESCRIPTION WOULD BE FOR 50 MG AND WHEN PICKED UP PATIENT STATED THE DOSAGE WAS STILL 100 MG    PATIENT ALSO STATED HE TALKED TO THE NURSE ABOUT TALKING TO DOCTOR ABOUT PUTTING HIM A LOW DOSE OF GABAPENTIN. PATIENT HAS NOT HEARD FROM ANYONE IN REGARDS TO THIS MATTER.    PLEASE ADVISE    CALL BACK NUMBER [phone]

## 2020-10-12 ENCOUNTER — HOSPITAL ENCOUNTER (OUTPATIENT)
Dept: CT IMAGING | Facility: HOSPITAL | Age: 60
Discharge: HOME | End: 2020-10-12
Admitting: INTERNAL MEDICINE

## 2020-10-12 ENCOUNTER — TELEPHONE (OUTPATIENT)
Dept: INTERNAL MEDICINE | Facility: CLINIC | Age: 60
End: 2020-10-12

## 2020-10-12 DIAGNOSIS — Z87.891 HISTORY OF TOBACCO USE, PRESENTING HAZARDS TO HEALTH: ICD-10-CM

## 2020-10-12 PROCEDURE — G0297 LDCT FOR LUNG CA SCREEN: HCPCS

## 2020-10-12 NOTE — TELEPHONE ENCOUNTER
PATIENT CALLED AND HAD A CT SCAN THIS MORNING AND WANTS DR MINOR TO CALL HIM REGARDING THE RESULTS;    CINDY: [phone]    PATIENT ALSO WAS ASKING ABOUT COVD TESTING AS HE IS SHORT OF AIR  PATIENT THINKS ALSO HE HAS A TOUCH OF THE PINK EYE AND WANTS TO KNOW IF SOMETHING CAN BE CALLED IN    [facility]

## 2020-10-13 ENCOUNTER — TELEPHONE (OUTPATIENT)
Dept: INTERNAL MEDICINE | Facility: CLINIC | Age: 60
End: 2020-10-13

## 2020-10-13 RX ORDER — TOBRAMYCIN 3 MG/ML
1 SOLUTION/ DROPS OPHTHALMIC
Qty: 5 ML | Refills: 0 | Status: SHIPPED | OUTPATIENT
Start: 2020-10-13 | End: 2021-03-24

## 2020-10-13 NOTE — TELEPHONE ENCOUNTER
Please let patient know that his CT scan showed no nodule or mass.  Also no pneumonia or any acute changes seen.  We'll repeat it in a year!  I've sent in eyedrops for his pink eye.  He needs to go through a drive thru testing center in [place] for the Covid testing.    thanks

## 2020-10-19 ENCOUNTER — TELEPHONE (OUTPATIENT)
Dept: INTERNAL MEDICINE | Facility: CLINIC | Age: 60
End: 2020-10-19

## 2020-10-19 NOTE — TELEPHONE ENCOUNTER
Patient wife calling stating patient would like referral for colonoscopy to be sent to another office. Does not want to be seen via [facility] that is currently coordinated for this referral. Patients wife did not state why.   Please advise - can contact patient and wife back at [phone] (home #).

## 2020-10-22 RX ORDER — SODIUM, POTASSIUM,MAG SULFATES 17.5-3.13G
2 SOLUTION, RECONSTITUTED, ORAL ORAL TAKE AS DIRECTED
Qty: 354 ML | Refills: 0 | Status: SHIPPED | OUTPATIENT
Start: 2020-10-22 | End: 2020-11-11 | Stop reason: SDUPTHER

## 2020-11-03 ENCOUNTER — TELEPHONE (OUTPATIENT)
Dept: PEDIATRICS | Facility: OTHER | Age: 60
End: 2020-11-03

## 2020-11-03 NOTE — TELEPHONE ENCOUNTER
PT IS REQUESTING A CALL BACK FROM  REGARDING HIS LAB RESULTS ON HIS KIDNEYS.    PT STATES THAT HE WAS UNABLE TO GET RESULTS FROM KIDNEY DRPepito DUE TO BEING BEHIND ON PAYMENT.    PT STATES HIS LABS WERE DONE AT [facility]        PLEASE ADVISE  [phone] HOME  [phone] CELL

## 2020-11-03 NOTE — TELEPHONE ENCOUNTER
Pt advised that we are unable to give him results from another MD, he will need to get them from ordering MD.  Pt states verbal understanding.

## 2020-11-05 RX ORDER — METOPROLOL SUCCINATE 50 MG/1
50 TABLET, EXTENDED RELEASE ORAL DAILY
Qty: 90 TABLET | Refills: 1 | Status: SHIPPED | OUTPATIENT
Start: 2020-11-05 | End: 2021-04-30 | Stop reason: SDUPTHER

## 2020-11-05 RX ORDER — PANTOPRAZOLE SODIUM 40 MG/1
40 TABLET, DELAYED RELEASE ORAL DAILY
Qty: 90 TABLET | Refills: 1 | Status: SHIPPED | OUTPATIENT
Start: 2020-11-05 | End: 2021-04-30 | Stop reason: SDUPTHER

## 2020-11-05 NOTE — TELEPHONE ENCOUNTER
Last Office Visit: 10/7/20  Next Office Visit: 11/23/20    Labs completed in past 6 months? yes  Labs completed in past year? yes    Last Refill Date: 8/3/20  Quantity: 90  Refills: 0    Pharmacy:

## 2020-11-11 RX ORDER — SODIUM, POTASSIUM,MAG SULFATES 17.5-3.13G
2 SOLUTION, RECONSTITUTED, ORAL ORAL TAKE AS DIRECTED
Qty: 354 ML | Refills: 0 | Status: SHIPPED | OUTPATIENT
Start: 2020-11-11 | End: 2021-03-24

## 2020-11-12 ENCOUNTER — TELEPHONE (OUTPATIENT)
Dept: INTERNAL MEDICINE | Facility: CLINIC | Age: 60
End: 2020-11-12

## 2020-11-12 DIAGNOSIS — T46.4X5A COUGH DUE TO ACE INHIBITOR: ICD-10-CM

## 2020-11-12 DIAGNOSIS — N41.1 CHRONIC PROSTATITIS: ICD-10-CM

## 2020-11-12 DIAGNOSIS — R05.8 COUGH DUE TO ACE INHIBITOR: ICD-10-CM

## 2020-11-12 DIAGNOSIS — I10 HYPERTENSION, UNSPECIFIED TYPE: ICD-10-CM

## 2020-11-12 RX ORDER — LOSARTAN POTASSIUM 25 MG/1
25 TABLET ORAL DAILY
Qty: 90 TABLET | Refills: 1 | Status: SHIPPED | OUTPATIENT
Start: 2020-11-12 | End: 2021-04-30 | Stop reason: SDUPTHER

## 2020-11-12 RX ORDER — BLOOD-GLUCOSE METER
1 KIT MISCELLANEOUS DAILY
Qty: 1 EACH | Refills: 0 | Status: SHIPPED | OUTPATIENT
Start: 2020-11-12

## 2020-11-12 RX ORDER — TAMSULOSIN HYDROCHLORIDE 0.4 MG/1
2 CAPSULE ORAL
Qty: 60 CAPSULE | Refills: 4 | Status: SHIPPED | OUTPATIENT
Start: 2020-11-12 | End: 2021-08-24

## 2020-11-12 NOTE — TELEPHONE ENCOUNTER
LOV 10/07/20  NOV 11/23/20    Flomax last refilled on 04/22/20 for 90 with 1 refill.  Losartan refilled on 08/20/20 for 90 with 0 reiflls

## 2020-11-12 NOTE — TELEPHONE ENCOUNTER
Caller: Yana Saucedoyd    Relationship: Self    Best call back number: [phone]    Medication needed:   Requested Prescriptions     Pending Prescriptions Disp Refills   • tamsulosin (FLOMAX) 0.4 MG capsule 24 hr capsule 90 capsule 1     Sig: Take 1 capsule by mouth every night at bedtime.   • losartan (Cozaar) 25 MG tablet 90 tablet 0     Sig: Take 1 tablet by mouth Daily. Replaces lisinopril       When do you need the refill by: 11/12/20    Does the patient have less than a 3 day supply:  [] Yes  [x] No    What is the patient's preferred pharmacy: [id number] - [place] [address] AT [facility] [address] & [facility] [phone] [facility] [phone] FX       Additional Request: Patient's current glucometer has quit working. Patient would like to know if a new glucometer could be called in for him. Patient stated he currently uses the One Touch Ultra so if something different is called in he will need supplies for it as well.

## 2020-11-17 ENCOUNTER — TELEPHONE (OUTPATIENT)
Dept: INTERNAL MEDICINE | Facility: CLINIC | Age: 60
End: 2020-11-17

## 2020-11-17 RX ORDER — BLOOD SUGAR DIAGNOSTIC
STRIP MISCELLANEOUS
Qty: 100 EACH | Refills: 3 | Status: CANCELLED | OUTPATIENT
Start: 2020-11-17

## 2020-11-17 NOTE — TELEPHONE ENCOUNTER
Spoke to Radha at pharmacy, states that pt needs to get new meter and pt has been advised of this.  It will cost him $21 dollars for the meter, but strips will be covered.  Pt told them he did not want to pay for new meter, and that he wanted new strips.  Advised pharmacy I would call and talk to pt.

## 2020-11-17 NOTE — TELEPHONE ENCOUNTER
PATIENT CALLED AND NEEDING REFILL ON ONE TOUCH VERIO FLEX STRIPS, HE SAID THIS FITS THIS METER; HE SAID THE ULTRA STRIPS WONT FIT THIS PARTICULAR METER AND MEDICARE WONT PAY FOR HIM TO GET A NEW ONE    PLEASE CALL AND ADVISE    CINDY: [phone]    [facility]

## 2020-12-02 ENCOUNTER — TELEPHONE (OUTPATIENT)
Dept: INTERNAL MEDICINE | Facility: CLINIC | Age: 60
End: 2020-12-02

## 2020-12-02 NOTE — TELEPHONE ENCOUNTER
Patient called stating he will not be needing services from Dr Tello anymore. Patient stated he did not appreciate how he has been taken care of. Offered another provider but patient refused and disconnected call.

## 2020-12-03 NOTE — TELEPHONE ENCOUNTER
Thanks for the FYI.  Annika, just wanted to let you know as well.( not sure if I need to call him to find out what the problem is or to let him be)  I've cancelled his January wellness appointment.    thanks

## 2021-01-22 DIAGNOSIS — F25.0 SCHIZOAFFECTIVE DISORDER, BIPOLAR TYPE (HCC): ICD-10-CM

## 2021-01-22 RX ORDER — TRAZODONE HYDROCHLORIDE 50 MG/1
TABLET ORAL
Qty: 60 TABLET | Refills: 0 | OUTPATIENT
Start: 2021-01-22

## 2021-03-24 ENCOUNTER — OFFICE VISIT (OUTPATIENT)
Dept: INTERNAL MEDICINE | Facility: CLINIC | Age: 61
End: 2021-03-24

## 2021-03-24 ENCOUNTER — LAB (OUTPATIENT)
Dept: LAB | Facility: HOSPITAL | Age: 61
End: 2021-03-24

## 2021-03-24 VITALS
TEMPERATURE: 97.3 F | DIASTOLIC BLOOD PRESSURE: 78 MMHG | HEIGHT: 73 IN | WEIGHT: 221 LBS | HEART RATE: 68 BPM | OXYGEN SATURATION: 96 % | BODY MASS INDEX: 29.29 KG/M2 | SYSTOLIC BLOOD PRESSURE: 120 MMHG

## 2021-03-24 DIAGNOSIS — E11.69 TYPE 2 DIABETES MELLITUS WITH OTHER SPECIFIED COMPLICATION, WITHOUT LONG-TERM CURRENT USE OF INSULIN (HCC): ICD-10-CM

## 2021-03-24 DIAGNOSIS — Z51.81 ENCOUNTER FOR THERAPEUTIC DRUG LEVEL MONITORING: ICD-10-CM

## 2021-03-24 DIAGNOSIS — E11.69 TYPE 2 DIABETES MELLITUS WITH OTHER SPECIFIED COMPLICATION, WITHOUT LONG-TERM CURRENT USE OF INSULIN (HCC): Primary | ICD-10-CM

## 2021-03-24 DIAGNOSIS — I10 ESSENTIAL HYPERTENSION: ICD-10-CM

## 2021-03-24 DIAGNOSIS — E11.40 TYPE 2 DIABETES MELLITUS WITH DIABETIC NEUROPATHY, WITHOUT LONG-TERM CURRENT USE OF INSULIN (HCC): ICD-10-CM

## 2021-03-24 LAB
ALBUMIN UR-MCNC: <1.2 MG/DL
HBA1C MFR BLD: 6.6 %

## 2021-03-24 PROCEDURE — 99213 OFFICE O/P EST LOW 20 MIN: CPT | Performed by: NURSE PRACTITIONER

## 2021-03-24 PROCEDURE — 83036 HEMOGLOBIN GLYCOSYLATED A1C: CPT | Performed by: NURSE PRACTITIONER

## 2021-03-24 PROCEDURE — 82043 UR ALBUMIN QUANTITATIVE: CPT

## 2021-03-24 RX ORDER — TRAZODONE HYDROCHLORIDE 100 MG/1
200 TABLET ORAL
COMMUNITY
Start: 2021-01-22 | End: 2021-08-24

## 2021-03-24 RX ORDER — GABAPENTIN 400 MG/1
400 CAPSULE ORAL 4 TIMES DAILY
Qty: 120 CAPSULE | Refills: 2 | Status: SHIPPED | OUTPATIENT
Start: 2021-03-24 | End: 2021-06-09 | Stop reason: SDUPTHER

## 2021-03-31 DIAGNOSIS — E11.40 TYPE 2 DIABETES MELLITUS WITH DIABETIC NEUROPATHY, WITHOUT LONG-TERM CURRENT USE OF INSULIN (HCC): ICD-10-CM

## 2021-03-31 DIAGNOSIS — Z51.81 ENCOUNTER FOR THERAPEUTIC DRUG LEVEL MONITORING: ICD-10-CM

## 2021-04-06 NOTE — ASSESSMENT & PLAN NOTE
Diabetes is improving with treatment.   Continue current treatment regimen.  Dietary recommendations for ADA diet.  Regular aerobic exercise.  Discussed ways to avoid symptomatic hypoglycemia.  Discussed sick day management.  Discussed foot care.  Reminded to get yearly retinal exam.  Diabetes will be reassessed in 3 months.

## 2021-04-14 ENCOUNTER — TELEPHONE (OUTPATIENT)
Dept: INTERNAL MEDICINE | Facility: CLINIC | Age: 61
End: 2021-04-14

## 2021-04-14 DIAGNOSIS — M17.0 PRIMARY OSTEOARTHRITIS OF BOTH KNEES: Primary | ICD-10-CM

## 2021-04-14 NOTE — TELEPHONE ENCOUNTER
Patient is calling about getting a referral to get shots in his knees.  Patient would like a call back. Please advise

## 2021-04-22 ENCOUNTER — OFFICE VISIT (OUTPATIENT)
Dept: INTERNAL MEDICINE | Facility: CLINIC | Age: 61
End: 2021-04-22

## 2021-04-22 ENCOUNTER — LAB (OUTPATIENT)
Dept: LAB | Facility: HOSPITAL | Age: 61
End: 2021-04-22

## 2021-04-22 VITALS
SYSTOLIC BLOOD PRESSURE: 123 MMHG | OXYGEN SATURATION: 98 % | DIASTOLIC BLOOD PRESSURE: 76 MMHG | WEIGHT: 212.6 LBS | TEMPERATURE: 97.4 F | HEART RATE: 66 BPM | BODY MASS INDEX: 28.05 KG/M2

## 2021-04-22 DIAGNOSIS — R20.0 NUMBNESS AND TINGLING: ICD-10-CM

## 2021-04-22 DIAGNOSIS — R20.2 NUMBNESS AND TINGLING: Primary | ICD-10-CM

## 2021-04-22 DIAGNOSIS — R20.0 NUMBNESS AND TINGLING: Primary | ICD-10-CM

## 2021-04-22 DIAGNOSIS — M25.50 ARTHRALGIA, UNSPECIFIED JOINT: ICD-10-CM

## 2021-04-22 DIAGNOSIS — R68.89 COLD INTOLERANCE: ICD-10-CM

## 2021-04-22 DIAGNOSIS — R20.2 NUMBNESS AND TINGLING: ICD-10-CM

## 2021-04-22 DIAGNOSIS — E11.42 TYPE 2 DIABETES MELLITUS WITH DIABETIC POLYNEUROPATHY, WITHOUT LONG-TERM CURRENT USE OF INSULIN (HCC): ICD-10-CM

## 2021-04-22 LAB
ALBUMIN SERPL-MCNC: 4.3 G/DL (ref 3.5–5.2)
ALBUMIN/GLOB SERPL: 1.7 G/DL
ALP SERPL-CCNC: 80 U/L (ref 39–117)
ALT SERPL W P-5'-P-CCNC: 15 U/L (ref 1–41)
ANION GAP SERPL CALCULATED.3IONS-SCNC: 10.1 MMOL/L (ref 5–15)
AST SERPL-CCNC: 15 U/L (ref 1–40)
BASOPHILS # BLD AUTO: 0.05 10*3/MM3 (ref 0–0.2)
BASOPHILS NFR BLD AUTO: 0.5 % (ref 0–1.5)
BILIRUB SERPL-MCNC: 0.2 MG/DL (ref 0–1.2)
BUN SERPL-MCNC: 19 MG/DL (ref 8–23)
BUN/CREAT SERPL: 12.8 (ref 7–25)
CALCIUM SPEC-SCNC: 9.4 MG/DL (ref 8.6–10.5)
CHLORIDE SERPL-SCNC: 107 MMOL/L (ref 98–107)
CHROMATIN AB SERPL-ACNC: <10 IU/ML (ref 0–14)
CK SERPL-CCNC: 94 U/L (ref 20–200)
CO2 SERPL-SCNC: 23.9 MMOL/L (ref 22–29)
CREAT SERPL-MCNC: 1.49 MG/DL (ref 0.76–1.27)
CRP SERPL-MCNC: <0.3 MG/DL (ref 0–0.5)
DEPRECATED RDW RBC AUTO: 42.7 FL (ref 37–54)
EOSINOPHIL # BLD AUTO: 0.26 10*3/MM3 (ref 0–0.4)
EOSINOPHIL NFR BLD AUTO: 2.5 % (ref 0.3–6.2)
ERYTHROCYTE [DISTWIDTH] IN BLOOD BY AUTOMATED COUNT: 12.5 % (ref 12.3–15.4)
ERYTHROCYTE [SEDIMENTATION RATE] IN BLOOD: 9 MM/HR (ref 0–20)
GFR SERPL CREATININE-BSD FRML MDRD: 48 ML/MIN/1.73
GLOBULIN UR ELPH-MCNC: 2.6 GM/DL
GLUCOSE SERPL-MCNC: 116 MG/DL (ref 65–99)
HCT VFR BLD AUTO: 43.7 % (ref 37.5–51)
HGB BLD-MCNC: 14.7 G/DL (ref 13–17.7)
IMM GRANULOCYTES # BLD AUTO: 0.02 10*3/MM3 (ref 0–0.05)
IMM GRANULOCYTES NFR BLD AUTO: 0.2 % (ref 0–0.5)
LYMPHOCYTES # BLD AUTO: 3.03 10*3/MM3 (ref 0.7–3.1)
LYMPHOCYTES NFR BLD AUTO: 29.7 % (ref 19.6–45.3)
MCH RBC QN AUTO: 31.3 PG (ref 26.6–33)
MCHC RBC AUTO-ENTMCNC: 33.6 G/DL (ref 31.5–35.7)
MCV RBC AUTO: 93 FL (ref 79–97)
MONOCYTES # BLD AUTO: 0.71 10*3/MM3 (ref 0.1–0.9)
MONOCYTES NFR BLD AUTO: 7 % (ref 5–12)
NEUTROPHILS NFR BLD AUTO: 6.14 10*3/MM3 (ref 1.7–7)
NEUTROPHILS NFR BLD AUTO: 60.1 % (ref 42.7–76)
NRBC BLD AUTO-RTO: 0 /100 WBC (ref 0–0.2)
PLATELET # BLD AUTO: 247 10*3/MM3 (ref 140–450)
PMV BLD AUTO: 12 FL (ref 6–12)
POTASSIUM SERPL-SCNC: 4.3 MMOL/L (ref 3.5–5.2)
PROT SERPL-MCNC: 6.9 G/DL (ref 6–8.5)
RBC # BLD AUTO: 4.7 10*6/MM3 (ref 4.14–5.8)
SODIUM SERPL-SCNC: 141 MMOL/L (ref 136–145)
TSH SERPL DL<=0.05 MIU/L-ACNC: 1.57 UIU/ML (ref 0.27–4.2)
VIT B12 BLD-MCNC: 409 PG/ML (ref 211–946)
WBC # BLD AUTO: 10.21 10*3/MM3 (ref 3.4–10.8)

## 2021-04-22 PROCEDURE — 85652 RBC SED RATE AUTOMATED: CPT

## 2021-04-22 PROCEDURE — 80053 COMPREHEN METABOLIC PANEL: CPT

## 2021-04-22 PROCEDURE — 82550 ASSAY OF CK (CPK): CPT

## 2021-04-22 PROCEDURE — 86038 ANTINUCLEAR ANTIBODIES: CPT

## 2021-04-22 PROCEDURE — 82607 VITAMIN B-12: CPT

## 2021-04-22 PROCEDURE — 84443 ASSAY THYROID STIM HORMONE: CPT

## 2021-04-22 PROCEDURE — 86431 RHEUMATOID FACTOR QUANT: CPT

## 2021-04-22 PROCEDURE — 99214 OFFICE O/P EST MOD 30 MIN: CPT | Performed by: NURSE PRACTITIONER

## 2021-04-22 PROCEDURE — 86140 C-REACTIVE PROTEIN: CPT

## 2021-04-22 PROCEDURE — 85025 COMPLETE CBC W/AUTO DIFF WBC: CPT
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
## 2021-04-23 LAB — ANA SER QL: NEGATIVE

## 2021-04-26 ENCOUNTER — TELEPHONE (OUTPATIENT)
Dept: INTERNAL MEDICINE | Facility: CLINIC | Age: 61
End: 2021-04-26

## 2021-04-26 NOTE — TELEPHONE ENCOUNTER
Caller: Carmelo Saucedo    Relationship: Self    Best call back number: [phone]     Caller requesting test results: CARMELO     What test was performed: LABS  When was the test performed: 04/22/2021    Where was the test performed: AT THE PRACTICE   Additional notes: PLEASE CALL AND ADVISE -[phone].

## 2021-04-27 NOTE — PROGRESS NOTES
Call pt with results. Normal rheumatoid lab; normal inflammatory markers. Normal B12, no anemia. Normal thyroid lab. Normal muscle enzyme lab. Electrolytes ok other than elevated glucose; kidney function 48%. Though likely that his pain numbness and tingling is due to neuropathy with his diabetes, would he like to see a neurologist to for further eval?

## 2021-04-28 ENCOUNTER — TELEPHONE (OUTPATIENT)
Dept: INTERNAL MEDICINE | Facility: CLINIC | Age: 61
End: 2021-04-28

## 2021-04-28 NOTE — TELEPHONE ENCOUNTER
Patient called back he was busy at the moment and couldn't answer his phone.    Please advise he stated you can call him back whenever someone is available

## 2021-04-28 NOTE — TELEPHONE ENCOUNTER
Caller: Carmelo Saucedo    Relationship: Self    Best call back number: [phone]    What was the call regarding: PATIENT STATED THAT HE WOULD LIKE A CALL BACK REGARDING HIS LABS RESULTS FROM 04/22/2021 SHOWING THAT HIS KIDNEY FUNCTION IS AT 40% AND PATIENT WOULD LIKE MORE INFORMATION REGARDING THIS. PATIENT STATED THAT HE WOULD LIKE A COPY OF THESE LABS MAILED TO HIM.    Do you require a callback: YES

## 2021-04-30 DIAGNOSIS — E78.2 MIXED HYPERLIPIDEMIA: ICD-10-CM

## 2021-04-30 DIAGNOSIS — T46.4X5A COUGH DUE TO ACE INHIBITOR: ICD-10-CM

## 2021-04-30 DIAGNOSIS — E03.9 ACQUIRED HYPOTHYROIDISM: ICD-10-CM

## 2021-04-30 DIAGNOSIS — R05.8 COUGH DUE TO ACE INHIBITOR: ICD-10-CM

## 2021-04-30 DIAGNOSIS — I10 HYPERTENSION, UNSPECIFIED TYPE: ICD-10-CM

## 2021-04-30 DIAGNOSIS — G43.909 MIGRAINE WITHOUT STATUS MIGRAINOSUS, NOT INTRACTABLE, UNSPECIFIED MIGRAINE TYPE: ICD-10-CM

## 2021-04-30 RX ORDER — TOPIRAMATE 50 MG/1
100 TABLET, FILM COATED ORAL
Qty: 180 TABLET | Refills: 1 | Status: SHIPPED | OUTPATIENT
Start: 2021-04-30 | End: 2021-10-15 | Stop reason: SDUPTHER

## 2021-04-30 RX ORDER — METOPROLOL SUCCINATE 50 MG/1
50 TABLET, EXTENDED RELEASE ORAL DAILY
Qty: 90 TABLET | Refills: 1 | Status: SHIPPED | OUTPATIENT
Start: 2021-04-30 | End: 2021-10-15 | Stop reason: SDUPTHER

## 2021-04-30 RX ORDER — LOSARTAN POTASSIUM 25 MG/1
25 TABLET ORAL DAILY
Qty: 90 TABLET | Refills: 1 | Status: SHIPPED | OUTPATIENT
Start: 2021-04-30 | End: 2021-10-15 | Stop reason: SDUPTHER

## 2021-04-30 RX ORDER — LEVOTHYROXINE SODIUM 88 UG/1
88 TABLET ORAL
Qty: 90 TABLET | Refills: 1 | Status: SHIPPED | OUTPATIENT
Start: 2021-04-30 | End: 2021-10-15 | Stop reason: SDUPTHER

## 2021-04-30 RX ORDER — ATORVASTATIN CALCIUM 20 MG/1
20 TABLET, FILM COATED ORAL
Qty: 90 TABLET | Refills: 1 | Status: SHIPPED | OUTPATIENT
Start: 2021-04-30 | End: 2021-10-15 | Stop reason: SDUPTHER

## 2021-04-30 RX ORDER — PANTOPRAZOLE SODIUM 40 MG/1
40 TABLET, DELAYED RELEASE ORAL DAILY
Qty: 90 TABLET | Refills: 1 | Status: SHIPPED | OUTPATIENT
Start: 2021-04-30 | End: 2021-10-15 | Stop reason: SDUPTHER

## 2021-04-30 NOTE — TELEPHONE ENCOUNTER
DELETE AFTER REVIEWING: Send the encounter HIGH priority, If patient has less than a 3 day supply. If the patient will run out of medication over the weekend add that information to the additional details line. Send this encounter to the clinical pool.    Caller: Carmelo Saucedo    Relationship: Self    Best call back number:[phone]    Medication needed:   Requested Prescriptions     Pending Prescriptions Disp Refills   • losartan (Cozaar) 25 MG tablet 90 tablet 1     Sig: Take 1 tablet by mouth Daily. Replaces lisinopril   • metoprolol succinate XL (TOPROL-XL) 50 MG 24 hr tablet 90 tablet 1     Sig: Take 1 tablet by mouth Daily.   • levothyroxine (SYNTHROID, LEVOTHROID) 88 MCG tablet 90 tablet 1     Sig: Take 1 tablet by mouth Every Morning Before Breakfast.   • pantoprazole (PROTONIX) 40 MG EC tablet 90 tablet 1     Sig: Take 1 tablet by mouth Daily.   • atorvastatin (LIPITOR) 20 MG tablet 90 tablet 1     Sig: Take 1 tablet by mouth every night at bedtime.   • topiramate (TOPAMAX) 50 MG tablet 180 tablet 1     Sig: Take 2 tablets by mouth every night at bedtime.       When do you need the refill by: 05/03/21    What additional details did the patient provide when requesting the medication: PATIENT WOULD LIKE A 90 DAY SUPPLY OF THE ABOVE LISTED MEDICATIONS    Does the patient have less than a 3 day supply:  [] Yes  [x] No    What is the patient's preferred pharmacy: [facility] - [place] - [address] [phone] [facility] [phone] FX

## 2021-04-30 NOTE — TELEPHONE ENCOUNTER
Called and explained results to patient.      Concerned exactly what it means that kidney function is only 47 percent.  Does he need to be referred to Nephrology?  Concerned about follow up on CT head results and referral to Neurosurgery.

## 2021-04-30 NOTE — TELEPHONE ENCOUNTER
Please let him know that his kidney function is still decreased, however it is improving from the last check. We will continue to monitor. All other labs look good.

## 2021-04-30 NOTE — TELEPHONE ENCOUNTER
Last Office Visit: 4/22/21  Next Office Visit:6/24/21    Labs completed in past 6 months? Yes 4/22/21  Labs completed in past year? yes    Last Refill Date:11/5/20; 11/12/20  Quantity:6 month supply for all  Refills:    Pharmacy:     Please review pended refill request for any changes needed on refills or quantities. Thank you!

## 2021-05-05 ENCOUNTER — OFFICE VISIT (OUTPATIENT)
Dept: INTERNAL MEDICINE | Facility: CLINIC | Age: 61
End: 2021-05-05

## 2021-05-05 DIAGNOSIS — J44.1 CHRONIC OBSTRUCTIVE PULMONARY DISEASE WITH ACUTE EXACERBATION (HCC): ICD-10-CM

## 2021-05-05 DIAGNOSIS — E11.42 DIABETIC PERIPHERAL NEUROPATHY (HCC): Primary | ICD-10-CM

## 2021-05-05 PROCEDURE — 99213 OFFICE O/P EST LOW 20 MIN: CPT | Performed by: NURSE PRACTITIONER

## 2021-05-07 ENCOUNTER — TELEPHONE (OUTPATIENT)
Dept: INTERNAL MEDICINE | Facility: CLINIC | Age: 61
End: 2021-05-07

## 2021-05-07 NOTE — TELEPHONE ENCOUNTER
PT CALLED BECAUSE YONI DISCUSSED WITH HIM ABOUT PRESCRIBING A CONTROLLED MED BUT FIRST HE NEEDED TO SIGN A CONTROLLED SUBSTANCE AGREEMENT.  HE WANTED TO KNOW IF HE COULD COME IN ANY TIME TO DO THAT. THE HUB TOLD HIM YES, HE COULD COME IN TO SIGN THAT AGREEMENT.   THERE IS NOT ONE IN THE PATIENT  FOLDER OR IN THE CHART NOTES.    PLEASE ADVISE

## 2021-05-07 NOTE — TELEPHONE ENCOUNTER
Provider: VLAD VALLEJO    Caller: LIAT WHITE    Relationship to Patient: SELF    Phone Number: [phone]    Reason for Call: PATIENT STATES THAT HE DOESN'T HAVE TRANSPORTATION TO COME AND SIGN THE SUBSTANCE CONTROL AGREEMENT. PATIENT STATES THAT HE WILL COME Monday 5/10/21

## 2021-05-12 ENCOUNTER — TELEPHONE (OUTPATIENT)
Dept: INTERNAL MEDICINE | Facility: CLINIC | Age: 61
End: 2021-05-12

## 2021-05-12 DIAGNOSIS — F41.3 OTHER MIXED ANXIETY DISORDERS: Primary | ICD-10-CM

## 2021-05-12 RX ORDER — CLONAZEPAM 0.5 MG/1
0.25 TABLET ORAL 2 TIMES DAILY PRN
Qty: 60 TABLET | Refills: 1 | OUTPATIENT
Start: 2021-05-12 | End: 2021-07-09

## 2021-05-12 RX ORDER — VENLAFAXINE HYDROCHLORIDE 75 MG/1
75 CAPSULE, EXTENDED RELEASE ORAL DAILY
Qty: 30 CAPSULE | Refills: 0 | OUTPATIENT
Start: 2021-05-12 | End: 2021-07-09

## 2021-05-12 NOTE — TELEPHONE ENCOUNTER
Pharmacy Name:  [facility] - [place] Nevarez [address] - [phone]  - [phone]       Pharmacy representative phone number: [phone]    What medication are you calling in regards to: clonazePAM (KlonoPIN) 0.5 MG tablet    What question does the pharmacy have: PATIENT IS ON PAIN MEDICATION AND PHARMACIST WANTS TO VERIFY WITH YOU.   HYDROcodone-acetaminophen (NORCO) 7.5-325 MG per tablet AND traZODone (DESYREL) 100 MG tablet     Who is the provider that prescribed the medication: YONI CHU    Additional notes:

## 2021-05-12 NOTE — TELEPHONE ENCOUNTER
Spoke with pharmacy. Norco was not showing active in his chart. He got his last fill on 4/19/21. Talked with pharmacy regarding instructions I gave to the patient. Pharmacist felt like patient would be fine, just wanted to make me aware.

## 2021-05-14 ENCOUNTER — TELEPHONE (OUTPATIENT)
Dept: INTERNAL MEDICINE | Facility: CLINIC | Age: 61
End: 2021-05-14

## 2021-05-14 DIAGNOSIS — Z71.6 ENCOUNTER FOR SMOKING CESSATION COUNSELING: Primary | ICD-10-CM

## 2021-05-14 RX ORDER — NITROGLYCERIN 0.4 MG/1
0.4 TABLET SUBLINGUAL
Qty: 30 TABLET | Refills: 1 | OUTPATIENT
Start: 2021-05-14 | End: 2021-07-09

## 2021-05-14 RX ORDER — IPRATROPIUM/ALBUTEROL SULFATE 20-100 MCG
1 MIST INHALER (GRAM) INHALATION 4 TIMES DAILY
Qty: 12 G | Refills: 1 | Status: SHIPPED | OUTPATIENT
Start: 2021-05-14 | End: 2021-06-03 | Stop reason: SDUPTHER

## 2021-05-14 RX ORDER — POLYETHYLENE GLYCOL 3350 17 G
4 POWDER IN PACKET (EA) ORAL AS NEEDED
Qty: 108 LOZENGE | Refills: 0 | Status: SHIPPED | OUTPATIENT
Start: 2021-05-14 | End: 2021-07-12

## 2021-05-14 NOTE — TELEPHONE ENCOUNTER
PATIENT STATES THAT HE SPOKE TO RADHA CHU ABOUT GETTING A MEDICATION TO HELP HIM STOP SMOKING. THE PATIENT STATES THAT HE ONLY HAS MEDICARE AND HE NEEDS SOMETHING THAT MEDICARE WILL COVER. THE PATIENT STATES THAT HE THINKS THAT MEDICARE WILL NOT COVER THE PATCHES. PLEASE CALL PATIENT TO LET HIM KNOW IF THERE IS A MEDICATION THAT CAN HELP HIM STOP SMOKING.      CALL [phone]

## 2021-05-24 NOTE — ASSESSMENT & PLAN NOTE
Diabetes is unchanged.   Continue current treatment regimen.  Reminded to bring in blood sugar diary at next visit.  Dietary recommendations for ADA diet.  Regular aerobic exercise.  Discussed ways to avoid symptomatic hypoglycemia.  Diabetes will be reassessed in 3 months.

## 2021-05-24 NOTE — PROGRESS NOTES
Subjective     Carmelo Saucedo is a 60 y.o. male here today for Diabetes (Pt to discuss labs, tingling/numbness on left side and has trouble sleeping on that side) and Numbness        I have reviewed the following portions of the patient's history and confirmed they are accurate: allergies, current medications, past family history, past medical history, past social history, past surgical history, problem list and ROS    I have personally completed the patient's review of systems.  60-year-old male presents request recent labs.  He also has concerns regarding his diabetes, and the numbness and tingling he has been experiencing on his left side. He also has been having trouble sleeping.      Review of Systems   Constitutional: Negative for chills, fever, unexpected weight gain and unexpected weight loss.   HENT: Negative for sore throat and swollen glands.    Eyes: Negative for blurred vision and visual disturbance.   Respiratory: Negative for cough.    Cardiovascular: Negative for chest pain, palpitations and leg swelling.   Gastrointestinal: Negative for abdominal pain and nausea.   Endocrine: Negative for polydipsia, polyphagia and polyuria.   Genitourinary: Negative for dysuria.   Musculoskeletal: Positive for arthralgias.   Skin: Negative for rash.   Allergic/Immunologic: Negative for environmental allergies and immunocompromised state.   Neurological: Positive for numbness. Negative for dizziness, weakness and headache.   Hematological: Negative for adenopathy.   Psychiatric/Behavioral: Positive for sleep disturbance and stress. Negative for behavioral problems and suicidal ideas. The patient is nervous/anxious.        Current Outpatient Medications on File Prior to Visit   Medication Sig   • acyclovir (ZOVIRAX) 400 MG tablet TAKE 1 TABLET BY MOUTH TWICE DAILY   • aspirin 81 MG chewable tablet Chew 162 mg Daily.   • atorvastatin (LIPITOR) 20 MG tablet Take 1 tablet by mouth every night at bedtime.   •  Cholecalciferol (VITAMIN D3) 125 MCG (5000 UT) capsule capsule Take 1 capsule by mouth Daily.   • gabapentin (NEURONTIN) 400 MG capsule Take 1 capsule by mouth 4 (Four) Times a Day.   • glucose monitor monitoring kit 1 each Daily. Patient requests One Touch Glucose Meter.  Please substitute covered meter per insurance if needed.   • hydroCHLOROthiazide (MICROZIDE) 12.5 MG capsule Take 1 capsule by mouth Every Morning.   • HYDROcodone-acetaminophen (NORCO) 7.5-325 MG per tablet Take 1 tablet by mouth 3 (Three) Times a Day.   • ipratropium-albuterol (DUONEB) 0.5-2.5 mg/3 ml nebulizer Take 3 mL by nebulization Every 6 (Six) Hours.   • Lancets (FREESTYLE) lancets FOR ONCE DAILY TESTING   • levothyroxine (SYNTHROID, LEVOTHROID) 88 MCG tablet Take 1 tablet by mouth Every Morning Before Breakfast.   • losartan (Cozaar) 25 MG tablet Take 1 tablet by mouth Daily. Replaces lisinopril   • metFORMIN (Glucophage) 500 MG tablet Take 1 tablet by mouth 2 (Two) Times a Day With Meals.   • metoprolol succinate XL (TOPROL-XL) 50 MG 24 hr tablet Take 1 tablet by mouth Daily.   • pantoprazole (PROTONIX) 40 MG EC tablet Take 1 tablet by mouth Daily.   • polyethylene glycol (GlycoLax) 17 GM/SCOOP powder Take 17 g by mouth Daily.   • tamsulosin (FLOMAX) 0.4 MG capsule 24 hr capsule Take 2 capsules by mouth every night at bedtime.   • topiramate (TOPAMAX) 50 MG tablet Take 2 tablets by mouth every night at bedtime.   • traZODone (DESYREL) 100 MG tablet Take 200 mg by mouth every night at bedtime.     No current facility-administered medications on file prior to visit.       Objective   There were no vitals filed for this visit.  There is no height or weight on file to calculate BMI.    Physical Exam  Vitals and nursing note reviewed.   Constitutional:       General: He is not in acute distress.     Appearance: He is well-developed. He is not diaphoretic.   HENT:      Head: Normocephalic and atraumatic.   Eyes:      Pupils: Pupils are equal,  round, and reactive to light.   Cardiovascular:      Rate and Rhythm: Normal rate and regular rhythm.      Heart sounds: Normal heart sounds. No murmur heard.   No friction rub. No gallop.    Pulmonary:      Effort: Pulmonary effort is normal.      Breath sounds: Normal breath sounds.   Musculoskeletal:         General: Normal range of motion.      Cervical back: Normal range of motion and neck supple.   Lymphadenopathy:      Cervical: No cervical adenopathy.   Skin:     General: Skin is warm.      Capillary Refill: Capillary refill takes less than 2 seconds.      Findings: No rash.   Neurological:      Mental Status: He is alert and oriented to person, place, and time.   Psychiatric:         Behavior: Behavior normal.         Thought Content: Thought content normal.         Judgment: Judgment normal.         Assessment/Plan   Problems Addressed this Visit        Neuro    Diabetic peripheral neuropathy (CMS/HCC) - Primary     Diabetes is unchanged.   Continue current treatment regimen.  Reminded to bring in blood sugar diary at next visit.  Dietary recommendations for ADA diet.  Regular aerobic exercise.  Discussed ways to avoid symptomatic hypoglycemia.  Diabetes will be reassessed in 3 months.            Pulmonary and Pneumonias    Chronic obstructive pulmonary disease (CMS/HCC)     COPD is improving with treatment.  Patient to keep COPD diary.  Discussed monitoring symptoms and use of quick-relief medications and contacting us early in the course of exacerbations.  Warning signs of respiratory distress were reviewed with the patient.   Counseled to avoid exposure to cigarette smoke.  Continue current medications.             Relevant Medications    ipratropium-albuterol (Combivent Respimat)  MCG/ACT inhaler      Diagnoses       Codes Comments    Diabetic peripheral neuropathy (CMS/HCC)    -  Primary ICD-10-CM: E11.42  ICD-9-CM: 250.60, 357.2     Chronic obstructive pulmonary disease with acute exacerbation  (CMS/[facility])     ICD-10-CM: J44.1  ICD-9-CM: 491.21              Current Outpatient Medications:   •  acyclovir (ZOVIRAX) 400 MG tablet, TAKE 1 TABLET BY MOUTH TWICE DAILY, Disp: 180 tablet, Rfl: 1  •  aspirin 81 MG chewable tablet, Chew 162 mg Daily., Disp: , Rfl:   •  atorvastatin (LIPITOR) 20 MG tablet, Take 1 tablet by mouth every night at bedtime., Disp: 90 tablet, Rfl: 1  •  Cholecalciferol (VITAMIN D3) 125 MCG (5000 UT) capsule capsule, Take 1 capsule by mouth Daily., Disp: 30 capsule, Rfl: 5  •  gabapentin (NEURONTIN) 400 MG capsule, Take 1 capsule by mouth 4 (Four) Times a Day., Disp: 120 capsule, Rfl: 2  •  glucose monitor monitoring kit, 1 each Daily. Patient requests One Touch Glucose Meter.  Please substitute covered meter per insurance if needed., Disp: 1 each, Rfl: 0  •  hydroCHLOROthiazide (MICROZIDE) 12.5 MG capsule, Take 1 capsule by mouth Every Morning., Disp: 90 capsule, Rfl: 1  •  HYDROcodone-acetaminophen (NORCO) 7.5-325 MG per tablet, Take 1 tablet by mouth 3 (Three) Times a Day., Disp: , Rfl: 0  •  ipratropium-albuterol (Combivent Respimat)  MCG/ACT inhaler, Inhale 1 puff 4 (Four) Times a Day., Disp: 12 g, Rfl: 1  •  ipratropium-albuterol (DUONEB) 0.5-2.5 mg/3 ml nebulizer, Take 3 mL by nebulization Every 6 (Six) Hours., Disp: 120 vial, Rfl: 3  •  Lancets (FREESTYLE) lancets, FOR ONCE DAILY TESTING, Disp: 100 each, Rfl: 3  •  levothyroxine (SYNTHROID, LEVOTHROID) 88 MCG tablet, Take 1 tablet by mouth Every Morning Before Breakfast., Disp: 90 tablet, Rfl: 1  •  losartan (Cozaar) 25 MG tablet, Take 1 tablet by mouth Daily. Replaces lisinopril, Disp: 90 tablet, Rfl: 1  •  metFORMIN (Glucophage) 500 MG tablet, Take 1 tablet by mouth 2 (Two) Times a Day With Meals., Disp: 60 tablet, Rfl: 3  •  metoprolol succinate XL (TOPROL-XL) 50 MG 24 hr tablet, Take 1 tablet by mouth Daily., Disp: 90 tablet, Rfl: 1  •  nitroglycerin (Nitrostat) 0.4 MG SL tablet, Place 1 tablet under the tongue Every 5  (Five) Minutes As Needed for Chest Pain. Only take up to 3 tablets. Call 911 if pain persists., Disp: 30 tablet, Rfl: 1  •  pantoprazole (PROTONIX) 40 MG EC tablet, Take 1 tablet by mouth Daily., Disp: 90 tablet, Rfl: 1  •  polyethylene glycol (GlycoLax) 17 GM/SCOOP powder, Take 17 g by mouth Daily., Disp: 500 g, Rfl: 1  •  tamsulosin (FLOMAX) 0.4 MG capsule 24 hr capsule, Take 2 capsules by mouth every night at bedtime., Disp: 60 capsule, Rfl: 4  •  topiramate (TOPAMAX) 50 MG tablet, Take 2 tablets by mouth every night at bedtime., Disp: 180 tablet, Rfl: 1  •  traZODone (DESYREL) 100 MG tablet, Take 200 mg by mouth every night at bedtime., Disp: , Rfl:   •  clonazePAM (KlonoPIN) 0.5 MG tablet, Take 0.5 tablets by mouth 2 (Two) Times a Day As Needed for Anxiety., Disp: 60 tablet, Rfl: 1  •  nicotine polacrilex (Nicotine Mini) 4 MG lozenge, Dissolve 1 lozenge in the mouth As Needed for Smoking Cessation., Disp: 108 lozenge, Rfl: 0  •  venlafaxine XR (Effexor XR) 75 MG 24 hr capsule, Take 1 capsule by mouth Daily., Disp: 30 capsule, Rfl: 0       Plan of care reviewed with the patient at the conclusion of today's visit.  Education was provided regarding diagnosis, management, and any prescribed or recommended OTC medications.  Patient verbalized understanding of and agreement with management plan.     Return in about 1 month (around 6/5/2021) for Recheck.      RADHA Medrano

## 2021-05-24 NOTE — ASSESSMENT & PLAN NOTE
COPD is improving with treatment.  Patient to keep COPD diary.  Discussed monitoring symptoms and use of quick-relief medications and contacting us early in the course of exacerbations.  Warning signs of respiratory distress were reviewed with the patient.   Counseled to avoid exposure to cigarette smoke.  Continue current medications.

## 2021-06-03 ENCOUNTER — TELEPHONE (OUTPATIENT)
Dept: INTERNAL MEDICINE | Facility: CLINIC | Age: 61
End: 2021-06-03

## 2021-06-03 DIAGNOSIS — J44.1 CHRONIC OBSTRUCTIVE PULMONARY DISEASE WITH ACUTE EXACERBATION (HCC): ICD-10-CM

## 2021-06-03 NOTE — TELEPHONE ENCOUNTER
Caller: Carmelo Saucedo    Relationship: Self    Best call back number: [phone]    Medication needed:   Requested Prescriptions     Pending Prescriptions Disp Refills   • ipratropium-albuterol (Combivent Respimat)  MCG/ACT inhaler 12 g 1     Sig: Inhale 1 puff 4 (Four) Times a Day.       When do you need the refill by: 06/03/2021    What additional details did the patient provide when requesting the medication: THE PATIENT STATED HE WOULD LIKE A REFILL OF HIS MEDICATION AND WOULD LIKE A 90 DAY SUPPLY.    Does the patient have less than a 3 day supply:  [] Yes  [x] No    What is the patient's preferred pharmacy: [facility] - [place] [address] [phone] [facility] [phone] FX

## 2021-06-03 NOTE — TELEPHONE ENCOUNTER
Last Office Visit: 5/5/21   Next Office Visit: 6/24/21    Labs completed in past 6 months? Yes   Labs completed in past year? Yes     Last Refill Date: 5/14/21  Quantity: 12  Refills:1    Pharmacy:    Please review pended refill request for any changes needed on refills or quantities. Thank you!

## 2021-06-04 ENCOUNTER — TELEPHONE (OUTPATIENT)
Dept: INTERNAL MEDICINE | Facility: CLINIC | Age: 61
End: 2021-06-04

## 2021-06-04 DIAGNOSIS — Z87.891 PERSONAL HISTORY OF NICOTINE DEPENDENCE: ICD-10-CM

## 2021-06-04 DIAGNOSIS — Z72.0 TOBACCO USE: Primary | ICD-10-CM

## 2021-06-04 RX ORDER — IPRATROPIUM/ALBUTEROL SULFATE 20-100 MCG
1 MIST INHALER (GRAM) INHALATION 4 TIMES DAILY
Qty: 12 G | Refills: 1 | Status: SHIPPED | OUTPATIENT
Start: 2021-06-04 | End: 2021-06-07 | Stop reason: SDUPTHER

## 2021-06-04 NOTE — TELEPHONE ENCOUNTER
Caller: Carmelo Saucedo    Relationship: Self    Best call back number: [phone]    What orders are you requesting (i.e. lab or imaging): CT SCAN OF HIS LUNGS     In what timeframe would the patient need to come in: ASAP     Where will you receive your lab/imaging services: [place]     Additional notes:  PATIENT HAVING A HARD TIME BREATHING- PATIENT SMOKED FOR OVER 50 YEARS, PAIN UNDER THE PATIENTS ARM

## 2021-06-07 RX ORDER — IPRATROPIUM/ALBUTEROL SULFATE 20-100 MCG
1 MIST INHALER (GRAM) INHALATION 4 TIMES DAILY
Qty: 12 G | Refills: 5 | Status: SHIPPED | OUTPATIENT
Start: 2021-06-07 | End: 2022-07-18 | Stop reason: SDUPTHER

## 2021-06-07 NOTE — TELEPHONE ENCOUNTER
PT [facility] 6-7-2021, THEY HAVE NOT RECEIVED THE REFILL FOR THE COMBIVENT RESPIMAT INHALER THAT WAS SENT ON 6-4-2021    PLEASE ADVISE PT, THANKS

## 2021-06-07 NOTE — TELEPHONE ENCOUNTER
It was sent to the pharmacy and they received it at 4:11 that day. Can you call the pharmacy to confirm.

## 2021-06-07 NOTE — TELEPHONE ENCOUNTER
I spoke with the pharmacy, she told me that they never received an order for him for the Combivent Respimat Inhaler, she told me that it would have to be reordered and make sure that the quantity and refills were filled out.

## 2021-06-07 NOTE — TELEPHONE ENCOUNTER
Spoke w/ pharmacy to make sure the received the refill for the combivent respimat inhaler, confirmed that they did. Called and spoke to pt to let him know that it was there.

## 2021-06-09 DIAGNOSIS — E11.40 TYPE 2 DIABETES MELLITUS WITH DIABETIC NEUROPATHY, WITHOUT LONG-TERM CURRENT USE OF INSULIN (HCC): ICD-10-CM

## 2021-06-09 NOTE — TELEPHONE ENCOUNTER
Caller: Carmelo Saucedo    Relationship: Self    Best call back number: [phone]    Medication needed:   Requested Prescriptions     Pending Prescriptions Disp Refills   • gabapentin (NEURONTIN) 400 MG capsule 120 capsule 2     Sig: Take 1 capsule by mouth 4 (Four) Times a Day.     What additional details did the patient provide when requesting the medication: PATIENT HAS ABOUT A WEEK SUPPLY LEFT.     Does the patient have less than a 3 day supply:  [] Yes  [x] No    What is the patient's preferred pharmacy: [facility] [place] [address] [phone] [facility] [phone]

## 2021-06-10 NOTE — TELEPHONE ENCOUNTER
Spoke to pt about his Gabapentin, says he has 10 days worth of medication left and no refills, states he does take 4 daily.

## 2021-06-14 RX ORDER — GABAPENTIN 400 MG/1
400 CAPSULE ORAL 4 TIMES DAILY
Qty: 120 CAPSULE | Refills: 2 | Status: SHIPPED | OUTPATIENT
Start: 2021-06-14 | End: 2021-09-30

## 2021-06-16 ENCOUNTER — TELEPHONE (OUTPATIENT)
Dept: INTERNAL MEDICINE | Facility: CLINIC | Age: 61
End: 2021-06-16

## 2021-06-16 NOTE — TELEPHONE ENCOUNTER
SPOKE WITH PT, MAILED ALEJANDRO, ADVISED PT THAT MEDICAL RECORDS CAN TAKE UP TO 30 DAYS, PT VOICED UNDERSTANDING

## 2021-06-16 NOTE — TELEPHONE ENCOUNTER
Caller: Carmelo Saucedo    Relationship: Self    Best call back number:[phone]    What form or medical record are you requesting: MEDICAL RECORD     Who is requesting this form or medical record from you: PATIENT     How would you like to receive the form or medical records (pick-up, mail, fax):      If pick-up IN OFFICE     Timeframe paperwork needed: AS SOON AS THE OFFICE CAN GET TO IT     Additional notes: PATIENT WOULD LIKE HIS MEDICAL RECORDS PRINTED OUT ON PAPER AND NOT PUT ON A DISC    PATIENT NEEDS HIS MEDICAL RECORDS 01/01/2019 TO TODAY

## 2021-06-24 ENCOUNTER — HOSPITAL ENCOUNTER (OUTPATIENT)
Dept: CT IMAGING | Facility: HOSPITAL | Age: 61
Discharge: HOME | End: 2021-06-24
Admitting: NURSE PRACTITIONER

## 2021-06-24 DIAGNOSIS — Z72.0 TOBACCO USE: ICD-10-CM

## 2021-06-24 DIAGNOSIS — Z87.891 PERSONAL HISTORY OF NICOTINE DEPENDENCE: ICD-10-CM

## 2021-06-24 PROCEDURE — 71271 CT THORAX LUNG CANCER SCR C-: CPT

## 2021-06-28 ENCOUNTER — TELEPHONE (OUTPATIENT)
Dept: INTERNAL MEDICINE | Facility: CLINIC | Age: 61
End: 2021-06-28

## 2021-07-12 ENCOUNTER — OFFICE VISIT (OUTPATIENT)
Dept: INTERNAL MEDICINE | Facility: CLINIC | Age: 61
End: 2021-07-12

## 2021-07-12 VITALS
WEIGHT: 213 LBS | BODY MASS INDEX: 28.23 KG/M2 | DIASTOLIC BLOOD PRESSURE: 64 MMHG | TEMPERATURE: 97.7 F | SYSTOLIC BLOOD PRESSURE: 112 MMHG | HEART RATE: 55 BPM | HEIGHT: 73 IN | OXYGEN SATURATION: 99 % | RESPIRATION RATE: 18 BRPM

## 2021-07-12 DIAGNOSIS — G89.4 CHRONIC PAIN SYNDROME: ICD-10-CM

## 2021-07-12 DIAGNOSIS — Z00.00 MEDICARE ANNUAL WELLNESS VISIT, SUBSEQUENT: Primary | ICD-10-CM

## 2021-07-12 DIAGNOSIS — Z12.5 SCREENING FOR PROSTATE CANCER: ICD-10-CM

## 2021-07-12 PROCEDURE — G0439 PPPS, SUBSEQ VISIT: HCPCS | Performed by: NURSE PRACTITIONER

## 2021-07-13 ENCOUNTER — TELEPHONE (OUTPATIENT)
Dept: INTERNAL MEDICINE | Facility: CLINIC | Age: 61
End: 2021-07-13

## 2021-07-13 NOTE — TELEPHONE ENCOUNTER
Caller: Carmelo Saucedo    Relationship: Self    Best call back number: [phone]    What is the best time to reach you: ANYTIME    Who are you requesting to speak with (clinical staff, provider,  specific staff member): YONI CHU    Do you know the name of the person who called: PATIENT    What was the call regarding: PATIENT WANTS TO DISCUSS HAVING A TOTAL BODY MRI    Do you require a callback: YES

## 2021-07-19 ENCOUNTER — TELEPHONE (OUTPATIENT)
Dept: INTERNAL MEDICINE | Facility: CLINIC | Age: 61
End: 2021-07-19

## 2021-07-19 NOTE — TELEPHONE ENCOUNTER
Let pt know of message. Pt verbalized understanding. Pt stated found a few abx and is going to try that first

## 2021-07-19 NOTE — TELEPHONE ENCOUNTER
Caller: Carmelo Saucedo    Relationship: Self    Best call back number:     What medication are you requesting: MEDICATION FOR KIDNEY INFECTION    What are your current symptoms: PAIN FROM GROIN TO BACK, LITTLE DISCOMFORT UPON URINATION    How long have you been experiencing symptoms: STARTED 071621    Have you had these symptoms before:    [x] Yes  [] No    Have you been treated for these symptoms before:   [x] Yes  [x] No    If a prescription is needed, what is your preferred pharmacy and phone number:      [facility]

## 2021-07-20 ENCOUNTER — OFFICE VISIT (OUTPATIENT)
Dept: INTERNAL MEDICINE | Facility: CLINIC | Age: 61
End: 2021-07-20

## 2021-07-20 VITALS
DIASTOLIC BLOOD PRESSURE: 78 MMHG | OXYGEN SATURATION: 98 % | RESPIRATION RATE: 16 BRPM | HEIGHT: 73 IN | BODY MASS INDEX: 27.83 KG/M2 | SYSTOLIC BLOOD PRESSURE: 130 MMHG | HEART RATE: 63 BPM | WEIGHT: 210 LBS | TEMPERATURE: 97.8 F

## 2021-07-20 DIAGNOSIS — N40.1 BENIGN PROSTATIC HYPERPLASIA WITH URINARY FREQUENCY: Primary | ICD-10-CM

## 2021-07-20 DIAGNOSIS — G89.29 CHRONIC LEFT-SIDED LOW BACK PAIN WITHOUT SCIATICA: ICD-10-CM

## 2021-07-20 DIAGNOSIS — M54.50 CHRONIC LEFT-SIDED LOW BACK PAIN WITHOUT SCIATICA: ICD-10-CM

## 2021-07-20 DIAGNOSIS — R35.0 BENIGN PROSTATIC HYPERPLASIA WITH URINARY FREQUENCY: Primary | ICD-10-CM

## 2021-07-20 LAB
BILIRUB BLD-MCNC: NEGATIVE MG/DL
CLARITY, POC: CLEAR
COLOR UR: YELLOW
GLUCOSE UR STRIP-MCNC: NEGATIVE MG/DL
KETONES UR QL: NEGATIVE
LEUKOCYTE EST, POC: NEGATIVE
NITRITE UR-MCNC: NEGATIVE MG/ML
PH UR: 6 [PH] (ref 5–8)
PROT UR STRIP-MCNC: NEGATIVE MG/DL
RBC # UR STRIP: NEGATIVE /UL
SP GR UR: 1.02 (ref 1–1.03)
UROBILINOGEN UR QL: NORMAL

## 2021-07-20 PROCEDURE — 99214 OFFICE O/P EST MOD 30 MIN: CPT | Performed by: NURSE PRACTITIONER

## 2021-07-20 PROCEDURE — 81003 URINALYSIS AUTO W/O SCOPE: CPT | Performed by: NURSE PRACTITIONER

## 2021-07-20 RX ORDER — CYCLOBENZAPRINE HCL 10 MG
10 TABLET ORAL 3 TIMES DAILY PRN
Qty: 20 TABLET | Refills: 0 | Status: SHIPPED | OUTPATIENT
Start: 2021-07-20 | End: 2022-08-05

## 2021-07-28 DIAGNOSIS — E11.69 TYPE 2 DIABETES MELLITUS WITH OTHER SPECIFIED COMPLICATION, WITHOUT LONG-TERM CURRENT USE OF INSULIN (HCC): ICD-10-CM

## 2021-08-16 ENCOUNTER — OFFICE VISIT (OUTPATIENT)
Dept: INTERNAL MEDICINE | Facility: CLINIC | Age: 61
End: 2021-08-16

## 2021-08-16 VITALS
SYSTOLIC BLOOD PRESSURE: 110 MMHG | HEIGHT: 73 IN | BODY MASS INDEX: 27.83 KG/M2 | DIASTOLIC BLOOD PRESSURE: 70 MMHG | WEIGHT: 210 LBS | OXYGEN SATURATION: 97 % | TEMPERATURE: 98.3 F | HEART RATE: 74 BPM | RESPIRATION RATE: 16 BRPM

## 2021-08-16 DIAGNOSIS — J06.9 URI WITH COUGH AND CONGESTION: Primary | ICD-10-CM

## 2021-08-16 DIAGNOSIS — L73.1 INGROWN HAIR: ICD-10-CM

## 2021-08-16 PROCEDURE — 99213 OFFICE O/P EST LOW 20 MIN: CPT | Performed by: NURSE PRACTITIONER

## 2021-08-16 RX ORDER — AZITHROMYCIN 250 MG/1
TABLET, FILM COATED ORAL
Qty: 6 TABLET | Refills: 0 | Status: SHIPPED | OUTPATIENT
Start: 2021-08-16 | End: 2022-08-05

## 2021-08-16 RX ORDER — DEXTROMETHORPHAN HYDROBROMIDE AND PROMETHAZINE HYDROCHLORIDE 15; 6.25 MG/5ML; MG/5ML
5 SYRUP ORAL 4 TIMES DAILY PRN
Qty: 120 ML | Refills: 0 | Status: SHIPPED | OUTPATIENT
Start: 2021-08-16 | End: 2022-08-05

## 2021-08-16 RX ORDER — GUAIFENESIN 600 MG/1
600 TABLET, EXTENDED RELEASE ORAL 2 TIMES DAILY
Qty: 30 TABLET | Refills: 0 | Status: SHIPPED | OUTPATIENT
Start: 2021-08-16 | End: 2022-08-05

## 2021-08-23 DIAGNOSIS — N41.1 CHRONIC PROSTATITIS: ICD-10-CM

## 2021-08-23 DIAGNOSIS — I10 ESSENTIAL HYPERTENSION: ICD-10-CM

## 2021-08-23 RX ORDER — TAMSULOSIN HYDROCHLORIDE 0.4 MG/1
CAPSULE ORAL
Qty: 60 CAPSULE | Refills: 0 | OUTPATIENT
Start: 2021-08-23

## 2021-08-23 RX ORDER — HYDROCHLOROTHIAZIDE 12.5 MG/1
CAPSULE, GELATIN COATED ORAL
Qty: 90 CAPSULE | Refills: 0 | Status: SHIPPED | OUTPATIENT
Start: 2021-08-23 | End: 2021-10-19

## 2021-08-23 RX ORDER — TRAZODONE HYDROCHLORIDE 100 MG/1
TABLET ORAL
Qty: 180 TABLET | Refills: 0 | OUTPATIENT
Start: 2021-08-23

## 2021-08-24 RX ORDER — SILDENAFIL CITRATE 20 MG/1
TABLET ORAL
Qty: 10 TABLET | Refills: 0 | Status: SHIPPED | OUTPATIENT
Start: 2021-08-24

## 2021-08-24 RX ORDER — TRAZODONE HYDROCHLORIDE 100 MG/1
TABLET ORAL
Qty: 180 TABLET | Refills: 0 | Status: SHIPPED | OUTPATIENT
Start: 2021-08-24 | End: 2022-08-05 | Stop reason: SDUPTHER

## 2021-08-24 RX ORDER — TAMSULOSIN HYDROCHLORIDE 0.4 MG/1
CAPSULE ORAL
Qty: 60 CAPSULE | Refills: 0 | Status: SHIPPED | OUTPATIENT
Start: 2021-08-24 | End: 2021-10-11 | Stop reason: SDUPTHER

## 2021-09-28 DIAGNOSIS — E11.40 TYPE 2 DIABETES MELLITUS WITH DIABETIC NEUROPATHY, WITHOUT LONG-TERM CURRENT USE OF INSULIN (HCC): ICD-10-CM

## 2021-10-01 NOTE — TELEPHONE ENCOUNTER
Pt is calling back for his refill.  He has been without his medicine for 2 days and is feeling the effects.

## 2021-10-02 RX ORDER — GABAPENTIN 400 MG/1
400 CAPSULE ORAL 4 TIMES DAILY
Qty: 120 CAPSULE | Refills: 0 | Status: SHIPPED | OUTPATIENT
Start: 2021-10-02 | End: 2021-11-02 | Stop reason: SDUPTHER

## 2021-10-02 NOTE — TELEPHONE ENCOUNTER
VIVIAN, WOULD YOU POSSIBLY CALL IN THE MEDICATION FOR THIS PATIENT.  HE IS EXTREMELY FRUSTRATED THAT HIS MEDICINE HASN'T BEEN CALLED IN   HE IS COMPLETELY OUT AND HAS BEEN SINCE Tuesday.     PLEASE LET ME KNOW SO THAT I CAN CALL THE PATIENT BACK WITH YOUR DECISION

## 2021-10-11 DIAGNOSIS — N41.1 CHRONIC PROSTATITIS: ICD-10-CM

## 2021-10-11 RX ORDER — TAMSULOSIN HYDROCHLORIDE 0.4 MG/1
2 CAPSULE ORAL
Qty: 180 CAPSULE | Refills: 0 | Status: SHIPPED | OUTPATIENT
Start: 2021-10-11 | End: 2021-10-15 | Stop reason: SDUPTHER

## 2021-10-11 NOTE — TELEPHONE ENCOUNTER
Caller: Carmelo Saucedo    Relationship: Self      Medication requested (name and dosage): tamsulosin (FLOMAX) 0.4 MG capsule 24 hr capsule    Pharmacy where request should be sent: [facility] [phone]    Additional details provided by patient: PATIENT USUALLY GETS A 90 DAY SUPPLY BECAUSE IT IS CHEAPER WITH HIS INSURNACE    Best call back number: [phone]    Does the patient have less than a 3 day supply:  [x] Yes  [] No    Manjeet Cullen Rep   10/11/21 13:16 EDT

## 2021-10-15 DIAGNOSIS — T46.4X5A COUGH DUE TO ACE INHIBITOR: ICD-10-CM

## 2021-10-15 DIAGNOSIS — E78.2 MIXED HYPERLIPIDEMIA: ICD-10-CM

## 2021-10-15 DIAGNOSIS — I10 HYPERTENSION, UNSPECIFIED TYPE: ICD-10-CM

## 2021-10-15 DIAGNOSIS — N41.1 CHRONIC PROSTATITIS: ICD-10-CM

## 2021-10-15 DIAGNOSIS — E03.9 ACQUIRED HYPOTHYROIDISM: ICD-10-CM

## 2021-10-15 DIAGNOSIS — G43.909 MIGRAINE WITHOUT STATUS MIGRAINOSUS, NOT INTRACTABLE, UNSPECIFIED MIGRAINE TYPE: ICD-10-CM

## 2021-10-15 DIAGNOSIS — R05.8 COUGH DUE TO ACE INHIBITOR: ICD-10-CM

## 2021-10-15 RX ORDER — PANTOPRAZOLE SODIUM 40 MG/1
40 TABLET, DELAYED RELEASE ORAL DAILY
Qty: 90 TABLET | Refills: 0 | Status: SHIPPED | OUTPATIENT
Start: 2021-10-15 | End: 2021-12-29 | Stop reason: SDUPTHER

## 2021-10-15 RX ORDER — TOPIRAMATE 50 MG/1
100 TABLET, FILM COATED ORAL
Qty: 180 TABLET | Refills: 0 | Status: SHIPPED | OUTPATIENT
Start: 2021-10-15 | End: 2022-08-05 | Stop reason: SDUPTHER

## 2021-10-15 RX ORDER — METOPROLOL SUCCINATE 50 MG/1
50 TABLET, EXTENDED RELEASE ORAL DAILY
Qty: 90 TABLET | Refills: 0 | Status: SHIPPED | OUTPATIENT
Start: 2021-10-15 | End: 2021-12-29 | Stop reason: SDUPTHER

## 2021-10-15 RX ORDER — ATORVASTATIN CALCIUM 20 MG/1
20 TABLET, FILM COATED ORAL
Qty: 90 TABLET | Refills: 0 | Status: SHIPPED | OUTPATIENT
Start: 2021-10-15 | End: 2021-12-29 | Stop reason: SDUPTHER

## 2021-10-15 RX ORDER — TAMSULOSIN HYDROCHLORIDE 0.4 MG/1
2 CAPSULE ORAL
Qty: 180 CAPSULE | Refills: 0 | Status: SHIPPED | OUTPATIENT
Start: 2021-10-15 | End: 2022-08-05 | Stop reason: SDUPTHER

## 2021-10-15 RX ORDER — LEVOTHYROXINE SODIUM 88 UG/1
88 TABLET ORAL
Qty: 90 TABLET | Refills: 0 | Status: SHIPPED | OUTPATIENT
Start: 2021-10-15 | End: 2021-12-29 | Stop reason: SDUPTHER

## 2021-10-15 RX ORDER — LOSARTAN POTASSIUM 25 MG/1
25 TABLET ORAL DAILY
Qty: 90 TABLET | Refills: 0 | Status: SHIPPED | OUTPATIENT
Start: 2021-10-15 | End: 2021-12-29 | Stop reason: SDUPTHER

## 2021-10-15 NOTE — TELEPHONE ENCOUNTER
Caller: Carmelo Saucedo    Relationship: Self    Medication requested (name and dosage):   tamsulosin (FLOMAX) 0.4 MG capsule 24 hr capsule  atorvastatin (LIPITOR) 20 MG tablet  levothyroxine (SYNTHROID, LEVOTHROID) 88 MCG tablet  losartan (Cozaar) 25 MG tablet  metoprolol succinate XL (TOPROL-XL) 50 MG 24 hr tablet  pantoprazole (PROTONIX) 40 MG EC tablet  topiramate (TOPAMAX) 50 MG tablet    Pharmacy where request should be sent[facility]    Additional details provided by patient: PATIENT WOULD LIKE 90 DAY SUPPLY OF MEDICATIONS, CHEAPER WITH INSURANCE.      Best call back number: [phone]    Does the patient have less than a 3 day supply:  [] Yes  [x] No    Idalia Crane   10/15/21 11:33 EDT

## 2021-10-16 DIAGNOSIS — E11.69 TYPE 2 DIABETES MELLITUS WITH OTHER SPECIFIED COMPLICATION, WITHOUT LONG-TERM CURRENT USE OF INSULIN (HCC): ICD-10-CM

## 2021-10-16 DIAGNOSIS — I10 ESSENTIAL HYPERTENSION: ICD-10-CM

## 2021-10-19 RX ORDER — HYDROCHLOROTHIAZIDE 12.5 MG/1
CAPSULE, GELATIN COATED ORAL
Qty: 90 CAPSULE | Refills: 1 | Status: SHIPPED | OUTPATIENT
Start: 2021-10-19 | End: 2021-11-22 | Stop reason: SDUPTHER

## 2021-10-20 ENCOUNTER — OFFICE VISIT (OUTPATIENT)
Dept: INTERNAL MEDICINE | Facility: CLINIC | Age: 61
End: 2021-10-20

## 2021-10-20 VITALS
BODY MASS INDEX: 27.84 KG/M2 | OXYGEN SATURATION: 98 % | SYSTOLIC BLOOD PRESSURE: 118 MMHG | DIASTOLIC BLOOD PRESSURE: 64 MMHG | HEART RATE: 70 BPM | TEMPERATURE: 98.5 F | WEIGHT: 211 LBS

## 2021-10-20 DIAGNOSIS — H10.13 ALLERGIC CONJUNCTIVITIS OF BOTH EYES: ICD-10-CM

## 2021-10-20 DIAGNOSIS — L02.31 ABSCESS OF BUTTOCK, RIGHT: Primary | ICD-10-CM

## 2021-10-20 PROCEDURE — 99213 OFFICE O/P EST LOW 20 MIN: CPT | Performed by: NURSE PRACTITIONER

## 2021-10-20 RX ORDER — LORATADINE 10 MG/1
10 TABLET ORAL DAILY
Qty: 30 TABLET | Refills: 2 | Status: SHIPPED | OUTPATIENT
Start: 2021-10-20 | End: 2022-08-05

## 2021-10-20 RX ORDER — SULFAMETHOXAZOLE AND TRIMETHOPRIM 800; 160 MG/1; MG/1
1 TABLET ORAL 2 TIMES DAILY
Qty: 14 TABLET | Refills: 0 | Status: SHIPPED | OUTPATIENT
Start: 2021-10-20 | End: 2021-10-27

## 2021-11-02 DIAGNOSIS — E11.40 TYPE 2 DIABETES MELLITUS WITH DIABETIC NEUROPATHY, WITHOUT LONG-TERM CURRENT USE OF INSULIN (HCC): ICD-10-CM

## 2021-11-02 RX ORDER — GABAPENTIN 400 MG/1
400 CAPSULE ORAL 4 TIMES DAILY
Qty: 120 CAPSULE | Refills: 0 | Status: SHIPPED | OUTPATIENT
Start: 2021-11-02 | End: 2021-12-29

## 2021-11-02 NOTE — TELEPHONE ENCOUNTER
Caller: Carmelo Saucedo    Relationship: Self      Requested Prescriptions:   Requested Prescriptions     Pending Prescriptions Disp Refills   • gabapentin (NEURONTIN) 400 MG capsule 120 capsule 0     Sig: Take 1 capsule by mouth 4 (Four) Times a Day.        Pharmacy where request should be sent: [facility] - [address] - [phone]  - [phone] FX    Additional details provided by patient:     Best call back number: [phone]    Does the patient have less than a 3 day supply:  [] Yes  [x] No    Manjeet White Rep   11/02/21 09:32 EDT

## 2021-11-19 DIAGNOSIS — I10 ESSENTIAL HYPERTENSION: ICD-10-CM

## 2021-11-19 DIAGNOSIS — B00.9 HSV-2 INFECTION: ICD-10-CM

## 2021-11-19 RX ORDER — ACYCLOVIR 400 MG/1
400 TABLET ORAL 2 TIMES DAILY
Qty: 180 TABLET | Refills: 2 | Status: SHIPPED | OUTPATIENT
Start: 2021-11-19 | End: 2022-08-26 | Stop reason: SDUPTHER

## 2021-11-19 RX ORDER — HYDROCHLOROTHIAZIDE 12.5 MG/1
12.5 CAPSULE, GELATIN COATED ORAL DAILY
Qty: 90 CAPSULE | Refills: 1 | OUTPATIENT
Start: 2021-11-19

## 2021-11-19 NOTE — TELEPHONE ENCOUNTER
Caller: Carmelo Saucedo    Relationship: Self    Best call back number: [phone]    Requested Prescriptions:   Requested Prescriptions     Pending Prescriptions Disp Refills   • hydroCHLOROthiazide (MICROZIDE) 12.5 MG capsule 90 capsule 1     Sig: Take 1 capsule by mouth Daily.   • acyclovir (ZOVIRAX) 400 MG tablet 180 tablet 1     Sig: Take 1 tablet by mouth 2 (Two) Times a Day. Take no more than 5 doses a day.        Pharmacy where request should be sent: [facility] [place] [address] [phone] [facility] [phone] [place]       Does the patient have less than a 3 day supply:  [x] Yes  [] No    Manjeet Gregorio Rep   11/19/21 10:07 EST

## 2021-11-22 ENCOUNTER — TELEPHONE (OUTPATIENT)
Dept: INTERNAL MEDICINE | Facility: CLINIC | Age: 61
End: 2021-11-22

## 2021-11-22 DIAGNOSIS — I10 ESSENTIAL HYPERTENSION: ICD-10-CM

## 2021-11-22 RX ORDER — HYDROCHLOROTHIAZIDE 12.5 MG/1
12.5 CAPSULE, GELATIN COATED ORAL DAILY
Qty: 90 CAPSULE | Refills: 1 | Status: SHIPPED | OUTPATIENT
Start: 2021-11-22 | End: 2022-05-20 | Stop reason: SDUPTHER

## 2021-12-29 ENCOUNTER — OFFICE VISIT (OUTPATIENT)
Dept: INTERNAL MEDICINE | Facility: CLINIC | Age: 61
End: 2021-12-29

## 2021-12-29 VITALS
HEIGHT: 73 IN | WEIGHT: 221.2 LBS | HEART RATE: 68 BPM | DIASTOLIC BLOOD PRESSURE: 62 MMHG | OXYGEN SATURATION: 97 % | SYSTOLIC BLOOD PRESSURE: 108 MMHG | TEMPERATURE: 98.5 F | BODY MASS INDEX: 29.31 KG/M2

## 2021-12-29 DIAGNOSIS — I10 HYPERTENSION, UNSPECIFIED TYPE: ICD-10-CM

## 2021-12-29 DIAGNOSIS — E03.9 ACQUIRED HYPOTHYROIDISM: ICD-10-CM

## 2021-12-29 DIAGNOSIS — E78.2 MIXED HYPERLIPIDEMIA: ICD-10-CM

## 2021-12-29 DIAGNOSIS — E11.40 TYPE 2 DIABETES MELLITUS WITH DIABETIC NEUROPATHY, WITHOUT LONG-TERM CURRENT USE OF INSULIN (HCC): Primary | ICD-10-CM

## 2021-12-29 LAB
EXPIRATION DATE: NORMAL
HBA1C MFR BLD: 7.3 %
Lab: NORMAL

## 2021-12-29 PROCEDURE — 83036 HEMOGLOBIN GLYCOSYLATED A1C: CPT | Performed by: STUDENT IN AN ORGANIZED HEALTH CARE EDUCATION/TRAINING PROGRAM

## 2021-12-29 PROCEDURE — 99214 OFFICE O/P EST MOD 30 MIN: CPT | Performed by: STUDENT IN AN ORGANIZED HEALTH CARE EDUCATION/TRAINING PROGRAM

## 2021-12-29 PROCEDURE — 3051F HG A1C>EQUAL 7.0%<8.0%: CPT | Performed by: STUDENT IN AN ORGANIZED HEALTH CARE EDUCATION/TRAINING PROGRAM

## 2021-12-29 RX ORDER — LEVOTHYROXINE SODIUM 88 UG/1
88 TABLET ORAL
Qty: 90 TABLET | Refills: 0 | Status: SHIPPED | OUTPATIENT
Start: 2021-12-29 | End: 2022-04-25

## 2021-12-29 RX ORDER — METOPROLOL SUCCINATE 50 MG/1
50 TABLET, EXTENDED RELEASE ORAL DAILY
Qty: 90 TABLET | Refills: 0 | Status: SHIPPED | OUTPATIENT
Start: 2021-12-29 | End: 2022-04-25

## 2021-12-29 RX ORDER — GABAPENTIN 600 MG/1
600 TABLET ORAL EVERY 6 HOURS
COMMUNITY
Start: 2021-12-07

## 2021-12-29 RX ORDER — LOSARTAN POTASSIUM 25 MG/1
25 TABLET ORAL DAILY
Qty: 90 TABLET | Refills: 0 | Status: SHIPPED | OUTPATIENT
Start: 2021-12-29 | End: 2022-04-25

## 2021-12-29 RX ORDER — PANTOPRAZOLE SODIUM 40 MG/1
40 TABLET, DELAYED RELEASE ORAL DAILY
Qty: 90 TABLET | Refills: 0 | Status: SHIPPED | OUTPATIENT
Start: 2021-12-29 | End: 2022-04-25

## 2021-12-29 RX ORDER — ATORVASTATIN CALCIUM 20 MG/1
20 TABLET, FILM COATED ORAL
Qty: 90 TABLET | Refills: 0 | Status: SHIPPED | OUTPATIENT
Start: 2021-12-29 | End: 2022-04-25

## 2021-12-29 NOTE — PROGRESS NOTES
New Patient Office Visit      Date: 2021      Patient Name: Carmelo Saucedo  : 1960   MRN: [id number]     Chief Complaint:    Chief Complaint   Patient presents with   • Establish Care   • Hypertension   • Hyperlipidemia   • Diabetes       History of Present Illness: Carmelo Saucedo is a 61 y.o. male who presents to clinic today to establish care.  He has no acute complaints or concerns today and is following up for DM2. HTN, and HLD. His diabetes is pretty well controlled on metformin alone.  He previously required insulin for management but no longer needs this. His most recent A1c was 6.6% in 2021. He denies hypoglycemia, polyuria, polydipsia. DM2 is complicated by neuropathy and nephropathy. He no longer follows with a nephrologist.  He follows with a pain management specialist who prescribes gabapentin for neuropathy.  Kidney function has been stable.  His blood pressure is well controlled with current regimen of losartan, HCTZ, and metoprolol.  He denies chest pain, palpitations, shortness of breath.    He was hospitalized in 2019 with acute renal failure secondary to lithium toxicity.  He was in the hospital for quite some time and then had rehabilitation at [facility].  It is taken him a long time to recover and still feels like he is not where he should be.    Subjective     Review of System: Review of Systems   Constitutional: Negative for fatigue and unexpected weight change.   HENT: Negative for hearing loss.    Eyes: Negative for visual disturbance.   Respiratory: Negative for cough and shortness of breath.    Cardiovascular: Negative for chest pain, palpitations and leg swelling.   Gastrointestinal: Negative for abdominal pain, blood in stool, constipation, diarrhea and nausea.   Endocrine: Negative for cold intolerance, heat intolerance, polydipsia and polyuria.   Genitourinary: Negative for difficulty urinating, dysuria, frequency, hematuria and urgency.   Skin: Negative for rash  and wound.   Allergic/Immunologic: Negative for environmental allergies.   Neurological: Negative for weakness, numbness and headaches.   Hematological: Does not bruise/bleed easily.   Psychiatric/Behavioral: The patient is not nervous/anxious.       I have reviewed the ROS documented by my clinical staff, updated appropriately and I agree. Renee Cope MD    Past Medical History:   Past Medical History:   Diagnosis Date   • Abnormal EKG    • Angular cheilitis    • Arthritis    • Benign prostatic hypertrophy    • Bipolar disorder (HCC)    • Chest pain    • Chronic pain    • COPD (chronic obstructive pulmonary disease) (HCC)    • Enlarged prostate without lower urinary tract symptoms (luts)    • Esophageal reflux    • Hematochezia    • Herpes simplex infection    • Hyperlipidemia    • Hypertension    • Insomnia    • Lower back pain    • Nephrolithiasis    • Peptic ulcer    • Pulmonary nodule    • Rectal polyp    • Rectal polyp    • Sialoadenitis    • Skin ulcer of neck (HCC)    • Uncontrolled type 2 diabetes mellitus with complication (HCC)        Past Surgical History:   Past Surgical History:   Procedure Laterality Date   • ADENOIDECTOMY     • CHOLECYSTECTOMY     • GALLBLADDER SURGERY     • KNEE SURGERY Bilateral     release x5   • SKIN CANCER DESTRUCTION     • TONSILLECTOMY         Family History:   Family History   Problem Relation Age of Onset   • Arthritis Mother    • Diabetes Mother    • Hypertension Mother    • Cancer Mother    • Thyroid disease Mother    • Heart attack Mother    • Arthritis Father    • Diabetes Father    • Hypertension Father    • Cancer Father    • Thyroid disease Father        Social History:   Social History     Socioeconomic History   • Marital status:    Tobacco Use   • Smoking status: Current Every Day Smoker     Packs/day: 1.50     Years: 50.00     Pack years: 75.00     Types: Cigarettes   • Smokeless tobacco: Never Used   Vaping Use   • Vaping Use: Never used   Substance  and Sexual Activity   • Alcohol use: No   • Drug use: No   • Sexual activity: Defer       Medications:     Current Outpatient Medications:   •  acyclovir (ZOVIRAX) 400 MG tablet, Take 1 tablet by mouth 2 (Two) Times a Day. Take no more than 5 doses a day., Disp: 180 tablet, Rfl: 2  •  aspirin 81 MG chewable tablet, Chew 162 mg Daily., Disp: , Rfl:   •  atorvastatin (LIPITOR) 20 MG tablet, Take 1 tablet by mouth every night at bedtime., Disp: 90 tablet, Rfl: 0  •  azithromycin (ZITHROMAX) 250 MG tablet, Take 2 tablets the first day, then 1 tablet daily for 4 days., Disp: 6 tablet, Rfl: 0  •  Cholecalciferol (VITAMIN D3) 125 MCG (5000 UT) capsule capsule, Take 1 capsule by mouth Daily., Disp: 30 capsule, Rfl: 5  •  cyclobenzaprine (FLEXERIL) 10 MG tablet, Take 1 tablet by mouth 3 (Three) Times a Day As Needed for Muscle Spasms., Disp: 20 tablet, Rfl: 0  •  gabapentin (NEURONTIN) 600 MG tablet, Take 600 mg by mouth Every 6 (Six) Hours., Disp: , Rfl:   •  glucose monitor monitoring kit, 1 each Daily. Patient requests One Touch Glucose Meter.  Please substitute covered meter per insurance if needed., Disp: 1 each, Rfl: 0  •  guaiFENesin (Mucinex) 600 MG 12 hr tablet, Take 1 tablet by mouth 2 (Two) Times a Day., Disp: 30 tablet, Rfl: 0  •  hydroCHLOROthiazide (MICROZIDE) 12.5 MG capsule, Take 1 capsule by mouth Daily., Disp: 90 capsule, Rfl: 1  •  HYDROcodone-acetaminophen (NORCO) 7.5-325 MG per tablet, Take 1 tablet by mouth 3 (Three) Times a Day., Disp: , Rfl: 0  •  ipratropium-albuterol (Combivent Respimat)  MCG/ACT inhaler, Inhale 1 puff 4 (Four) Times a Day., Disp: 12 g, Rfl: 5  •  ipratropium-albuterol (DUONEB) 0.5-2.5 mg/3 ml nebulizer, Take 3 mL by nebulization Every 6 (Six) Hours., Disp: 120 vial, Rfl: 3  •  levothyroxine (SYNTHROID, LEVOTHROID) 88 MCG tablet, Take 1 tablet by mouth Every Morning Before Breakfast., Disp: 90 tablet, Rfl: 0  •  loratadine (CLARITIN) 10 MG tablet, Take 1 tablet by mouth  "Daily., Disp: 30 tablet, Rfl: 2  •  losartan (Cozaar) 25 MG tablet, Take 1 tablet by mouth Daily. Replaces lisinopril, Disp: 90 tablet, Rfl: 0  •  metFORMIN (GLUCOPHAGE) 500 MG tablet, Take 1 tablet by mouth 2 (Two) Times a Day With Meals., Disp: 180 tablet, Rfl: 1  •  metoprolol succinate XL (TOPROL-XL) 50 MG 24 hr tablet, Take 1 tablet by mouth Daily., Disp: 90 tablet, Rfl: 0  •  nystatin-triamcinolone (MYCOLOG II) 513435-1.1 UNIT/GM-% cream, Apply topically to the appropriate area as directed 2 (Two) Times a Day for 5 days., Disp: , Rfl:   •  pantoprazole (PROTONIX) 40 MG EC tablet, Take 1 tablet by mouth Daily., Disp: 90 tablet, Rfl: 0  •  promethazine-dextromethorphan (PROMETHAZINE-DM) 6.25-15 MG/5ML syrup, Take 5 mL by mouth 4 (Four) Times a Day As Needed for Cough., Disp: 120 mL, Rfl: 0  •  sildenafil (REVATIO) 20 MG tablet, TAKE TWO TO THREE TABLETS BY MOUTH ONE HOUR PRIOR TO INTERCOURSE, Disp: 10 tablet, Rfl: 0  •  tamsulosin (FLOMAX) 0.4 MG capsule 24 hr capsule, Take 2 capsules by mouth every night at bedtime., Disp: 180 capsule, Rfl: 0  •  topiramate (TOPAMAX) 50 MG tablet, Take 2 tablets by mouth every night at bedtime., Disp: 180 tablet, Rfl: 0  •  traZODone (DESYREL) 100 MG tablet, TAKE TWO TABLETS BY MOUTH AT BEDTIME, Disp: 180 tablet, Rfl: 0    Allergies:   Allergies   Allergen Reactions   • Lisinopril Cough       Objective     Physical Exam:   Vital Signs:   Vitals:    12/29/21 0836   BP: 108/62   Pulse: 68   Temp: 98.5 °F (36.9 °C)   SpO2: 97%   Weight: 100 kg (221 lb 3.2 oz)   Height: 185.4 cm (73\")   PainSc: 0-No pain     Body mass index is 29.18 kg/m².     Physical Exam  Vitals and nursing note reviewed.   Constitutional:       Appearance: Normal appearance.   Cardiovascular:      Rate and Rhythm: Normal rate and regular rhythm.      Heart sounds: Normal heart sounds.   Pulmonary:      Effort: Pulmonary effort is normal.      Breath sounds: Normal breath sounds. No wheezing, rhonchi or rales. "   Abdominal:      General: Abdomen is flat. Bowel sounds are normal.      Palpations: Abdomen is soft.   Skin:     General: Skin is warm and dry.   Neurological:      Mental Status: He is alert.   Psychiatric:         Mood and Affect: Mood normal.         Behavior: Behavior normal.       Assessment / Plan      Assessment/Plan:   Diagnoses and all orders for this visit:    1. Type 2 diabetes mellitus with diabetic neuropathy, without long-term current use of insulin (HCC) (Primary)  A1c is slightly up to 7.3% from 6.6% in 03/2021.  This may be due to recent holidays.  No indication to change any treatment at this time.  Continue Metformin 500 mg twice daily.  If A1c remains elevated at next check in 6 months, can increase Metformin to 1000 mg twice daily.  Continue losartan 25 mg for nephropathy and gabapentin 400 mg 4 times daily for neuropathy.  Urine microalbumin due at next appointment.    2. Mixed hyperlipidemia  Chronic, stable.  Continue atorvastatin 20 mg daily.    3. Hypertension, unspecified type  Chronic, stable.  Continue losartan 25 mg daily, HCTZ 12.5 mg daily, and metoprolol succinate 50 mg daily.    4. Acquired hypothyroidism  Chronic, stable.  Last TSH obtained in 04/22/2021 was 1.5.  Continue levothyroxine 88 mcg daily.  Repeat TSH at next appointment.    Needs repeat colonoscopy.  Last in 2013 with 4 benign 3 to 5 mm polyps in the rectum, sigmoid, and descending colon.  6 [place] meter polyp in the ascending colon and 6 mm polyp in the sigmoid colon.  Recommended repeat interval was 3 years.  Discussed repeating colonoscopy towards the end of this year.  Will assess around October/November.     Follow Up:   Return in about 6 months (around 6/29/2022) for Annual.    Time:   I spent approximately 35 minutes providing clinical care for this patient; including review of patient's chart and provider documentation, face to face time spent with patient in examination room (obtaining history, performing  physical exam, discussing diagnosis and management options), placing orders, and completing patient documentation.     Renee Cope MD  [facility]

## 2022-04-05 ENCOUNTER — TELEPHONE (OUTPATIENT)
Dept: INTERNAL MEDICINE | Facility: CLINIC | Age: 62
End: 2022-04-05

## 2022-04-05 NOTE — TELEPHONE ENCOUNTER
Called patient to reschedule 7/15 appt with Prisca, provider leaving practice. Wrong number, no longer in service.

## 2022-04-19 ENCOUNTER — TELEPHONE (OUTPATIENT)
Dept: INTERNAL MEDICINE | Facility: CLINIC | Age: 62
End: 2022-04-19

## 2022-04-19 DIAGNOSIS — E11.40 TYPE 2 DIABETES MELLITUS WITH DIABETIC NEUROPATHY, WITHOUT LONG-TERM CURRENT USE OF INSULIN: Primary | ICD-10-CM

## 2022-04-19 NOTE — TELEPHONE ENCOUNTER
Caller: Carmelo Saucedo    Relationship: Self    Best call back number:  [phone]     Requested Prescriptions:   Requested Prescriptions      No prescriptions requested or ordered in this encounter    TEST STRIPS FOR ONE TOUCH  ULTRA 2     [facility] [facility] [phone]  - [phone] FX     Additional details provided by patient:     Does the patient have less than a 3 day supply:  [x] Yes  [x] No    Manjeet Lennon Rep   04/19/22 08:34 EDT

## 2022-04-19 NOTE — TELEPHONE ENCOUNTER
Caller: CINDY     Relationship: PATIENT     Best call back number: [phone]    Who are you requesting to speak with (clinical staff, provider,  specific staff member):   CLINICAL     What was the call regarding: PATIENT STATES HIS SUGAR HAS BEEN RUNNING HIGH   THIS MORNING 4-19-22 BEFORE FOOD  AT 8:05 IT   4-18-22 BEFORE FOOD IT   4-17-22  MORNING 176  4-16-22 AFTERNOON 216   4-15-22 AFTERNOON  336    PATIENT STATES HE IS TAKING HIS METFORMIN EVERYDAY   HE IS CONCERNED IT IS RUNNING HIGH   HE HAD SOME COFFEE IN THE MORNING TIME AND WANTS TO KNOW IF THAT HAS AN AFFECT ON IT   PLEASE ADVISE WHAT HE SHOULD DO       Do you require a callback: PLEASE CALL

## 2022-04-25 DIAGNOSIS — E03.9 ACQUIRED HYPOTHYROIDISM: ICD-10-CM

## 2022-04-25 DIAGNOSIS — E78.2 MIXED HYPERLIPIDEMIA: ICD-10-CM

## 2022-04-25 DIAGNOSIS — I10 HYPERTENSION, UNSPECIFIED TYPE: ICD-10-CM

## 2022-04-25 RX ORDER — ATORVASTATIN CALCIUM 20 MG/1
20 TABLET, FILM COATED ORAL
Qty: 90 TABLET | Refills: 0 | Status: SHIPPED | OUTPATIENT
Start: 2022-04-25 | End: 2022-07-23 | Stop reason: SDUPTHER

## 2022-04-25 RX ORDER — LEVOTHYROXINE SODIUM 88 UG/1
88 TABLET ORAL
Qty: 90 TABLET | Refills: 0 | Status: SHIPPED | OUTPATIENT
Start: 2022-04-25 | End: 2022-07-23 | Stop reason: SDUPTHER

## 2022-04-25 RX ORDER — LOSARTAN POTASSIUM 25 MG/1
25 TABLET ORAL DAILY
Qty: 90 TABLET | Refills: 0 | Status: SHIPPED | OUTPATIENT
Start: 2022-04-25 | End: 2022-07-23 | Stop reason: SDUPTHER

## 2022-04-25 RX ORDER — METOPROLOL SUCCINATE 50 MG/1
50 TABLET, EXTENDED RELEASE ORAL DAILY
Qty: 90 TABLET | Refills: 0 | Status: SHIPPED | OUTPATIENT
Start: 2022-04-25 | End: 2022-07-23 | Stop reason: SDUPTHER

## 2022-04-25 RX ORDER — PANTOPRAZOLE SODIUM 40 MG/1
40 TABLET, DELAYED RELEASE ORAL DAILY
Qty: 90 TABLET | Refills: 0 | Status: SHIPPED | OUTPATIENT
Start: 2022-04-25 | End: 2022-07-23 | Stop reason: SDUPTHER

## 2022-04-26 ENCOUNTER — LAB (OUTPATIENT)
Dept: LAB | Facility: HOSPITAL | Age: 62
End: 2022-04-26

## 2022-04-26 ENCOUNTER — OFFICE VISIT (OUTPATIENT)
Dept: INTERNAL MEDICINE | Facility: CLINIC | Age: 62
End: 2022-04-26

## 2022-04-26 VITALS
HEART RATE: 77 BPM | BODY MASS INDEX: 29.29 KG/M2 | DIASTOLIC BLOOD PRESSURE: 70 MMHG | TEMPERATURE: 96.8 F | SYSTOLIC BLOOD PRESSURE: 120 MMHG | OXYGEN SATURATION: 97 % | WEIGHT: 222 LBS

## 2022-04-26 DIAGNOSIS — E03.9 ACQUIRED HYPOTHYROIDISM: ICD-10-CM

## 2022-04-26 DIAGNOSIS — E11.40 TYPE 2 DIABETES MELLITUS WITH DIABETIC NEUROPATHY, WITHOUT LONG-TERM CURRENT USE OF INSULIN: ICD-10-CM

## 2022-04-26 DIAGNOSIS — I10 PRIMARY HYPERTENSION: ICD-10-CM

## 2022-04-26 DIAGNOSIS — N18.31 STAGE 3A CHRONIC KIDNEY DISEASE: ICD-10-CM

## 2022-04-26 DIAGNOSIS — Z12.5 PROSTATE CANCER SCREENING: ICD-10-CM

## 2022-04-26 DIAGNOSIS — E11.69 TYPE 2 DIABETES MELLITUS WITH OTHER SPECIFIED COMPLICATION, WITHOUT LONG-TERM CURRENT USE OF INSULIN: ICD-10-CM

## 2022-04-26 DIAGNOSIS — R35.1 BENIGN PROSTATIC HYPERPLASIA WITH NOCTURIA: ICD-10-CM

## 2022-04-26 DIAGNOSIS — E11.69 TYPE 2 DIABETES MELLITUS WITH OTHER SPECIFIED COMPLICATION, WITHOUT LONG-TERM CURRENT USE OF INSULIN: Primary | ICD-10-CM

## 2022-04-26 DIAGNOSIS — N40.1 BENIGN PROSTATIC HYPERPLASIA WITH NOCTURIA: ICD-10-CM

## 2022-04-26 PROBLEM — H35.371 EPIRETINAL MEMBRANE (ERM) OF RIGHT EYE: Status: ACTIVE | Noted: 2021-07-26

## 2022-04-26 PROBLEM — H25.813 COMBINED FORM OF AGE-RELATED CATARACT, BOTH EYES: Status: ACTIVE | Noted: 2021-07-26

## 2022-04-26 PROBLEM — H02.826: Status: ACTIVE | Noted: 2021-07-26

## 2022-04-26 PROBLEM — H35.033 BILATERAL HYPERTENSIVE RETINOPATHY: Status: ACTIVE | Noted: 2018-01-11

## 2022-04-26 PROBLEM — F17.200 SMOKER: Status: ACTIVE | Noted: 2021-07-26

## 2022-04-26 PROBLEM — H04.123 DRY EYES, BILATERAL: Status: ACTIVE | Noted: 2021-07-26

## 2022-04-26 PROBLEM — H43.399 FLOATERS: Status: ACTIVE | Noted: 2021-07-26

## 2022-04-26 PROBLEM — H02.59 FLOPPY EYELID SYNDROME: Status: ACTIVE | Noted: 2018-01-11

## 2022-04-26 LAB
ANION GAP SERPL CALCULATED.3IONS-SCNC: 11.2 MMOL/L (ref 5–15)
BUN SERPL-MCNC: 23 MG/DL (ref 8–23)
BUN/CREAT SERPL: 14 (ref 7–25)
CALCIUM SPEC-SCNC: 9.8 MG/DL (ref 8.6–10.5)
CHLORIDE SERPL-SCNC: 100 MMOL/L (ref 98–107)
CO2 SERPL-SCNC: 26.8 MMOL/L (ref 22–29)
CREAT SERPL-MCNC: 1.64 MG/DL (ref 0.76–1.27)
EGFRCR SERPLBLD CKD-EPI 2021: 46.7 ML/MIN/1.73
EXPIRATION DATE: ABNORMAL
GLUCOSE SERPL-MCNC: 169 MG/DL (ref 65–99)
HBA1C MFR BLD: 10.1 %
Lab: ABNORMAL
POTASSIUM SERPL-SCNC: 4.5 MMOL/L (ref 3.5–5.2)
PSA SERPL-MCNC: 2.38 NG/ML (ref 0–4)
SODIUM SERPL-SCNC: 138 MMOL/L (ref 136–145)
TSH SERPL DL<=0.05 MIU/L-ACNC: 1.52 UIU/ML (ref 0.27–4.2)

## 2022-04-26 PROCEDURE — 84443 ASSAY THYROID STIM HORMONE: CPT

## 2022-04-26 PROCEDURE — G0103 PSA SCREENING: HCPCS

## 2022-04-26 PROCEDURE — 36415 COLL VENOUS BLD VENIPUNCTURE: CPT

## 2022-04-26 PROCEDURE — 80048 BASIC METABOLIC PNL TOTAL CA: CPT

## 2022-04-26 PROCEDURE — 83036 HEMOGLOBIN GLYCOSYLATED A1C: CPT | Performed by: STUDENT IN AN ORGANIZED HEALTH CARE EDUCATION/TRAINING PROGRAM

## 2022-04-26 PROCEDURE — 99214 OFFICE O/P EST MOD 30 MIN: CPT | Performed by: STUDENT IN AN ORGANIZED HEALTH CARE EDUCATION/TRAINING PROGRAM

## 2022-04-26 PROCEDURE — 3046F HEMOGLOBIN A1C LEVEL >9.0%: CPT | Performed by: STUDENT IN AN ORGANIZED HEALTH CARE EDUCATION/TRAINING PROGRAM

## 2022-04-26 RX ORDER — PEN NEEDLE, DIABETIC 30 GX5/16"
1 NEEDLE, DISPOSABLE MISCELLANEOUS NIGHTLY
Qty: 90 EACH | Refills: 3 | Status: SHIPPED | OUTPATIENT
Start: 2022-04-26 | End: 2022-10-27 | Stop reason: SDUPTHER

## 2022-04-26 NOTE — TELEPHONE ENCOUNTER
Caller: LewisYanayd KIKA    Relationship: Self    Best call back number:    [phone]    Requested Prescriptions:   Requested Prescriptions     Pending Prescriptions Disp Refills   • glucose blood test strip 200 each 6     Sig: Use as instructed      PATIENT STATED HE IS ALMOST OUT OF THE TEST STRIPS    Pharmacy where request should be sent: [facility] - [facility] - [phone]  - [phone] FX     Additional details provided by patient:     PATIENT ALSO ASKED ABOUT THE PRESCRIPTION FOR MEDICATION LISTED BELOW    empagliflozin (JARDIANCE) 10 MG tablet tablet    PATIENT STATED HE THOUGHT IT WAS DECIDED NOT TO PRESCRIBE THIS MEDICATION SINCE IT CAUSES YEAST INFECTIONS    PLEASE CONTACT PATIENT WITH A RESPONSE TO HIS QUESTION REGARDING THE JARDIANCE    Does the patient have less than a 3 day supply:  [x] Yes  [] No    Manjeet Wang Rep   04/26/22 14:04 EDT     DR ADRIAN

## 2022-04-26 NOTE — PROGRESS NOTES
Internal Medicine Established Office Visit      Date: 2022     Patient Name: Carmelo Saucedo  : 1960   MRN: [id number]     Chief Complaint:    Chief Complaint   Patient presents with   • Hypertension   • Diabetes     Joint pain       History of Present Illness: Carmelo Saucedo is a 61 y.o. male who presents to clinic today for follow up. His last A1c in 2021 was ~7%. He became concerned several weeks ago because his glucose checks at home were increasing with some post-prandial checks being in the 300s. He reports being on insulin in the past but was able to come off of it with diet changes. He reports recently drinking more sodas and eating more processed foods. He also notes polyuria and polydipsia.     His brother was diagnosed with prostate cancer several months ago. He has not had a PSA checked since  when it was normal. He reports nocturia and weak stream.     Subjective     I have reviewed and the following portions of the patient's history were updated as appropriate: past family history, past medical history, past social history, past surgical history and problem list.     Medications:     Current Outpatient Medications:   •  acyclovir (ZOVIRAX) 400 MG tablet, Take 1 tablet by mouth 2 (Two) Times a Day. Take no more than 5 doses a day., Disp: 180 tablet, Rfl: 2  •  atorvastatin (LIPITOR) 20 MG tablet, Take 1 tablet by mouth every night at bedtime., Disp: 90 tablet, Rfl: 0  •  gabapentin (NEURONTIN) 600 MG tablet, Take 600 mg by mouth Every 6 (Six) Hours., Disp: , Rfl:   •  glucose blood test strip, Use as instructed, Disp: 200 each, Rfl: 6  •  glucose monitor monitoring kit, 1 each Daily. Patient requests One Touch Glucose Meter.  Please substitute covered meter per insurance if needed., Disp: 1 each, Rfl: 0  •  hydroCHLOROthiazide (MICROZIDE) 12.5 MG capsule, Take 1 capsule by mouth Daily., Disp: 90 capsule, Rfl: 1  •  HYDROcodone-acetaminophen (NORCO) 7.5-325 MG per tablet, Take 1  tablet by mouth 3 (Three) Times a Day., Disp: , Rfl: 0  •  ipratropium-albuterol (Combivent Respimat)  MCG/ACT inhaler, Inhale 1 puff 4 (Four) Times a Day., Disp: 12 g, Rfl: 5  •  ipratropium-albuterol (DUONEB) 0.5-2.5 mg/3 ml nebulizer, Take 3 mL by nebulization Every 6 (Six) Hours., Disp: 120 vial, Rfl: 3  •  levothyroxine (SYNTHROID, LEVOTHROID) 88 MCG tablet, Take 1 tablet by mouth Every Morning Before Breakfast., Disp: 90 tablet, Rfl: 0  •  losartan (COZAAR) 25 MG tablet, Take 1 tablet by mouth Daily. Replaces lisinopril, Disp: 90 tablet, Rfl: 0  •  metFORMIN (GLUCOPHAGE) 500 MG tablet, Take 1 tablet by mouth 2 (Two) Times a Day With Meals., Disp: 180 tablet, Rfl: 1  •  metoprolol succinate XL (TOPROL-XL) 50 MG 24 hr tablet, Take 1 tablet by mouth Daily., Disp: 90 tablet, Rfl: 0  •  pantoprazole (PROTONIX) 40 MG EC tablet, Take 1 tablet by mouth Daily., Disp: 90 tablet, Rfl: 0  •  sildenafil (REVATIO) 20 MG tablet, TAKE TWO TO THREE TABLETS BY MOUTH ONE HOUR PRIOR TO INTERCOURSE, Disp: 10 tablet, Rfl: 0  •  tamsulosin (FLOMAX) 0.4 MG capsule 24 hr capsule, Take 2 capsules by mouth every night at bedtime., Disp: 180 capsule, Rfl: 0  •  traZODone (DESYREL) 100 MG tablet, TAKE TWO TABLETS BY MOUTH AT BEDTIME, Disp: 180 tablet, Rfl: 0  •  aspirin 81 MG chewable tablet, Chew 162 mg Daily., Disp: , Rfl:   •  azithromycin (ZITHROMAX) 250 MG tablet, Take 2 tablets the first day, then 1 tablet daily for 4 days., Disp: 6 tablet, Rfl: 0  •  Cholecalciferol (VITAMIN D3) 125 MCG (5000 UT) capsule capsule, Take 1 capsule by mouth Daily., Disp: 30 capsule, Rfl: 5  •  cyclobenzaprine (FLEXERIL) 10 MG tablet, Take 1 tablet by mouth 3 (Three) Times a Day As Needed for Muscle Spasms., Disp: 20 tablet, Rfl: 0  •  guaiFENesin (Mucinex) 600 MG 12 hr tablet, Take 1 tablet by mouth 2 (Two) Times a Day., Disp: 30 tablet, Rfl: 0  •  loratadine (CLARITIN) 10 MG tablet, Take 1 tablet by mouth Daily., Disp: 30 tablet, Rfl: 2  •   nystatin-triamcinolone (MYCOLOG II) 231810-1.1 UNIT/GM-% cream, Apply topically to the appropriate area as directed 2 (Two) Times a Day for 5 days., Disp: , Rfl:   •  promethazine-dextromethorphan (PROMETHAZINE-DM) 6.25-15 MG/5ML syrup, Take 5 mL by mouth 4 (Four) Times a Day As Needed for Cough., Disp: 120 mL, Rfl: 0  •  topiramate (TOPAMAX) 50 MG tablet, Take 2 tablets by mouth every night at bedtime., Disp: 180 tablet, Rfl: 0    Allergies:   Allergies   Allergen Reactions   • Lisinopril Cough       Objective     Physical Exam:   Vital Signs:   Vitals:    04/26/22 0803   BP: 120/70   Pulse: 77   Temp: 96.8 °F (36 °C)   SpO2: 97%   Weight: 101 kg (222 lb)   PainSc:   6     Body mass index is 29.29 kg/m².     Physical Exam  Vitals and nursing note reviewed.   Constitutional:       Appearance: Normal appearance.   Cardiovascular:      Rate and Rhythm: Normal rate and regular rhythm.      Heart sounds: Normal heart sounds.   Pulmonary:      Effort: Pulmonary effort is normal.      Breath sounds: Normal breath sounds. No wheezing, rhonchi or rales.   Skin:     General: Skin is warm and dry.   Neurological:      Mental Status: He is alert.       Assessment / Plan      Assessment/Plan:   Diagnoses and all orders for this visit:    1. Type 2 diabetes mellitus with other specified complication, without long-term current use of insulin (HCC) (Primary)  A1c is now 10.1%, up from 7.3% in 12/2021. Increasing metformin to 100 mg BID and adding Glargine 10 units at night (counseled patient to increase by 2 units each night if fasting glucose >150). Discussed starting GLP-1 but he has history of pancreatitis. Also discussed staring SGLT-2 inhibitor but he has been on this in the past and had groin yeast infections. Plan to check in in 1 week and titrate insulin based on glucose checks. BMP ordered. Recommended making eye exam appointment.     2. Primary hypertension  Chronic, stable. Continue Losartan 25 mg daily and HCTZ 12.5 mg  daily. BMP orderd to assess renal function and potassium while on these medications.     3. Prostate cancer screening  Brother diagnosed with prostate cancer 2 months ago. Has history of BPH and reports nocturia and weak stream. PSA ordered.     4. BPH   Uncontrolled with LUTS - nocturia, weak stream. Continue Tamsulosin 0.4 mg. Could consider addition of Finasteride.     5. CKD 3a  In the setting of HTN and DM2 but also previous admission to hospital for renal failure 2/2 lithium toxicity. BMP ordered. Continue Losartan 25 mg daily and adding Jardiance 10 mg as above.     5. Hypothyroidism   Clinically euthyroid. TSH ordered. Continue Levothyroxine 88 mcg daily. Adjustment to dose may be made based on TSH results.     Follow Up:   Return in about 3 months (around 7/26/2022) for with Dr Moyer .    Time:  I spent approximately 32 minutes providing clinical care for this patient; including review of patient's chart and provider documentation, face to face time spent with patient in examination room (obtaining history, performing physical exam, discussing diagnosis and management options), placing orders, and completing patient documentation.     Addendum (04/27/2022): Labs reviewed. Kidney function is stable. TSH and PSA are WNL. No changes to medications based on lab results.     Renee Cope MD  [facility] [place]

## 2022-04-26 NOTE — TELEPHONE ENCOUNTER
Rx Refill Note  Requested Prescriptions     Pending Prescriptions Disp Refills   • glucose blood test strip 200 each 6     Sig: Use as instructed      Last office visit with prescribing clinician: 4/26/2022      Next office visit with prescribing clinician: Visit date not found            Julieth Valentine MA  04/26/22, 15:37 EDT

## 2022-04-27 ENCOUNTER — TELEPHONE (OUTPATIENT)
Dept: INTERNAL MEDICINE | Facility: CLINIC | Age: 62
End: 2022-04-27

## 2022-04-27 DIAGNOSIS — E11.40 TYPE 2 DIABETES MELLITUS WITH DIABETIC NEUROPATHY, WITHOUT LONG-TERM CURRENT USE OF INSULIN: ICD-10-CM

## 2022-04-27 NOTE — TELEPHONE ENCOUNTER
Patient called and said that [facility] received the transfer from the prescription pad but they can't fill the prescription from a transferred rx.     Please send the rx for his test strips to [facility] in [place].     He is completely out of his strips so he can't test his sugar.

## 2022-04-27 NOTE — TELEPHONE ENCOUNTER
----- Message from Renee Cope MD sent at 4/27/2022  6:53 AM EDT -----  Please call Mr. Rhodes and let him know that I have reviewed his labs. His thyroid function, kidney function, and electrolytes are stable. No changes need to be made in medications based on these results. The lab used to screen for prostate cancer is within normal limits.     Also, please let him know that I mistakenly sent in the Jardiance medication. He does not need to take that because of his history of yeast infection. He should take the increased dose of metformin and the nightly insulin and I will check in with him next week to see what his morning blood sugar has been.     SELVIN

## 2022-04-27 NOTE — TELEPHONE ENCOUNTER
Pt notified of lab results and Dr. Cope's message regarding medications and verbalized understanding.

## 2022-04-27 NOTE — TELEPHONE ENCOUNTER
Caller: [facility] [address] [place] [address] [phone] [place] [phone] FX    Relationship: Pharmacy    Best call back number: [phone]    Requested Prescriptions:   Requested Prescriptions     Pending Prescriptions Disp Refills   • glucose blood test strip 200 each 6     Sig: Use as instructed        Pharmacy where request should be sent: [facility] [address] [place] [address] [phone] [place] [phone] FX     Additional details provided by patient:  PHARMACY STATES THAT THEY NEED THIS MEDICATION SENT TO THEM WITH THE  DISGNOSIS CODE AND AMOUNT OF TIMES HE HAS TO TEST.  Does the patient have less than a 3 day supply:  [x] Yes  [] No    Manjeet Avila Rep   04/27/22 12:20 EDT

## 2022-04-27 NOTE — TELEPHONE ENCOUNTER
Caller: CINDY     Relationship to patient: SELF    Best call back number: [phone]    Patient is needing: PATIENT STATED THAT HE WAS CALLING TO SEE IF PROVIDER WOULD WANT HIM TO TAKE JARDIANCE MEDICATION BECAUSE FROM WHAT HE THOUGHT HE WAS NOT TO TAKE IT ANY LONGER AND JUST WANTED TO MAKE SURE HE DID HEAR PROVIDER RIGHT    PLEASE ADVISE

## 2022-04-28 NOTE — TELEPHONE ENCOUNTER
[facility] CALLED BACK STATING THEY RECEIVED ANOTHER ORDER FOR THE TEST STRIPS, BUT THIS ONE SAYS TWICE A DAY AND THE FORM WAS NOT RE-SENT STATING HE HAS TO TEST MORE THAN ONCE.    SO NOW THEY JUST WANT TO KNOW IF HE TESTS TWICE A DAY, OR FOUR TIMES AND THE PHARMACIST ASKED IF WE COULD SEND IN AN ORDER FOR EVEN ONCE A DAY UNTIL WE GET THE REST FIGURED OUT AS HE HASN'T BEEN ABLE TO TEST IN A WHILE.    PLEASE GIVE [facility] A CALL TO CLARIFY.

## 2022-04-28 NOTE — TELEPHONE ENCOUNTER
Spoke w/ [facility], states due to Medicare, needs to be at least 3 times daily glucose check since pt on insulin and have dx code on rx. Sent in updated rx of test strips.

## 2022-04-28 NOTE — TELEPHONE ENCOUNTER
PT CALLED STATING [facility] CANNOT FILL HIS TEST STRIPS FOR MORE THAN ONE A DAY, DUE TO PT'S MEDICARE.    [facility] STATED THEY NEED AN ORDER OR PAPER FROM DR. ADRIAN STATING HE MEDICALLY NEEDS TO TEST TWICE A DAY.    PT STATED IF ITS AN ISSUE, HE'D RATHER TEST ONCE A DAY THAN NONE A DAY.

## 2022-04-29 ENCOUNTER — TELEPHONE (OUTPATIENT)
Dept: INTERNAL MEDICINE | Facility: CLINIC | Age: 62
End: 2022-04-29

## 2022-04-29 NOTE — TELEPHONE ENCOUNTER
REINA FROM [facility] CALLED WITH QUESTIONS REGARDING THE PATIENTS RX:  glucose blood test strip    SHE SAID THERE IS A PROBLEM WITH THE INSURANCE COVERAGE AND THE AMOUNT OF TESTING STRIPS BEING PRESCRIBED DAILY.    THEY WOULD LIKE A PHONE CALL BACK ASA TO DISCUSS FURTHER.    REINA, [facility]  [phone]

## 2022-05-05 ENCOUNTER — TELEPHONE (OUTPATIENT)
Dept: INTERNAL MEDICINE | Facility: CLINIC | Age: 62
End: 2022-05-05

## 2022-05-05 NOTE — TELEPHONE ENCOUNTER
----- Message from Renee Cope MD sent at 5/5/2022  7:16 AM EDT -----  Regarding: RE: Blood Glucose  Thank you. Let him know to keep checking his blood sugar, especially in the morning and let us know if it is consistently > 180. Thanks!     ----- Message -----  From: Daryl Sahu MA  Sent: 5/4/2022  10:51 AM EDT  To: Renee Cope MD  Subject: FW: Blood Glucose                                Pt stated his morning fasting blood sugar for today was 150. He did not take the other day.   Please advise  ----- Message -----  From: Renee Cope MD  Sent: 5/4/2022   7:04 AM EDT  To: [facility]  Subject: Blood Glucose                                    Please call Mr. Rhodes and ask him how his morning fasting blood sugar has been the last few days. Thanks!     Kristin Cope MD

## 2022-05-05 NOTE — TELEPHONE ENCOUNTER
----- Message from Renee Cope MD sent at 5/5/2022  7:16 AM EDT -----  Regarding: RE: Blood Glucose  Thank you. Let him know to keep checking his blood sugar, especially in the morning and let us know if it is consistently > 180. Thanks!     ----- Message -----  From: Daryl Sahu MA  Sent: 5/4/2022  10:51 AM EDT  To: Renee Cope MD  Subject: FW: Blood Glucose                                Pt stated his morning fasting blood sugar for today was 150. He did not take the other day.   Please advise  ----- Message -----  From: Renee Cope MD  Sent: 5/4/2022   7:04 AM EDT  To: [facility]  Subject: Blood Glucose                                    Please call Mr. Rhodes and ask him how his morning fasting blood sugar has been the last few days. Thanks!     Kristin Cope MD

## 2022-05-20 DIAGNOSIS — I10 ESSENTIAL HYPERTENSION: ICD-10-CM

## 2022-05-20 RX ORDER — HYDROCHLOROTHIAZIDE 12.5 MG/1
12.5 CAPSULE, GELATIN COATED ORAL DAILY
Qty: 90 CAPSULE | Refills: 1 | Status: SHIPPED | OUTPATIENT
Start: 2022-05-20 | End: 2022-08-05 | Stop reason: SDUPTHER

## 2022-05-20 NOTE — TELEPHONE ENCOUNTER
Caller: Carmelo Saucedo    Relationship: Self    Best call back number:[phone]    Requested Prescriptions:   Requested Prescriptions     Pending Prescriptions Disp Refills   • hydroCHLOROthiazide (MICROZIDE) 12.5 MG capsule 90 capsule 1     Sig: Take 1 capsule by mouth Daily.        Pharmacy where request should be sent: [facility] [address] [place] [address] [phone] [place] [phone] FX     Additional details provided by patient: PATIENT NEEDS REFILL    Does the patient have less than a 3 day supply:  [] Yes  [] No    Manjeet Casey Rep   05/20/22 15:07 EDT

## 2022-07-18 DIAGNOSIS — J44.1 CHRONIC OBSTRUCTIVE PULMONARY DISEASE WITH ACUTE EXACERBATION: ICD-10-CM

## 2022-07-18 RX ORDER — IPRATROPIUM/ALBUTEROL SULFATE 20-100 MCG
1 MIST INHALER (GRAM) INHALATION 4 TIMES DAILY
Qty: 12 G | Refills: 1 | Status: SHIPPED | OUTPATIENT
Start: 2022-07-18 | End: 2023-01-20 | Stop reason: SDUPTHER

## 2022-07-18 NOTE — TELEPHONE ENCOUNTER
Caller: Carmelo Saucedo    Relationship: Self    Best call back number: [phone]    Requested Prescriptions:   Requested Prescriptions     Pending Prescriptions Disp Refills   • ipratropium-albuterol (Combivent Respimat)  MCG/ACT inhaler 12 g 5     Sig: Inhale 1 puff 4 (Four) Times a Day.    REQUESTING 90 DAY SUPPLY    Pharmacy where request should be sent:      [facility] [address] [place], [place] [phone] [place], [place] [phone] FX        Additional details provided by patient: HAS APPOINTMENT SCHEDULED FOR 08/05/22    Does the patient have less than a 3 day supply:  [x] Yes  [] No    Manjeet Polk Rep   07/18/22 09:00 EDT

## 2022-07-23 DIAGNOSIS — I10 HYPERTENSION, UNSPECIFIED TYPE: ICD-10-CM

## 2022-07-23 DIAGNOSIS — E78.2 MIXED HYPERLIPIDEMIA: ICD-10-CM

## 2022-07-23 DIAGNOSIS — E03.9 ACQUIRED HYPOTHYROIDISM: ICD-10-CM

## 2022-07-23 DIAGNOSIS — E11.69 TYPE 2 DIABETES MELLITUS WITH OTHER SPECIFIED COMPLICATION, WITHOUT LONG-TERM CURRENT USE OF INSULIN: ICD-10-CM

## 2022-07-23 RX ORDER — ATORVASTATIN CALCIUM 20 MG/1
20 TABLET, FILM COATED ORAL
Qty: 30 TABLET | Refills: 0 | Status: SHIPPED | OUTPATIENT
Start: 2022-07-23 | End: 2022-08-05 | Stop reason: SDUPTHER

## 2022-07-23 RX ORDER — PANTOPRAZOLE SODIUM 40 MG/1
40 TABLET, DELAYED RELEASE ORAL DAILY
Qty: 30 TABLET | Refills: 0 | Status: SHIPPED | OUTPATIENT
Start: 2022-07-23 | End: 2022-08-05 | Stop reason: SDUPTHER

## 2022-07-23 RX ORDER — METOPROLOL SUCCINATE 50 MG/1
50 TABLET, EXTENDED RELEASE ORAL DAILY
Qty: 30 TABLET | Refills: 0 | Status: SHIPPED | OUTPATIENT
Start: 2022-07-23 | End: 2022-08-05 | Stop reason: SDUPTHER

## 2022-07-23 RX ORDER — LOSARTAN POTASSIUM 25 MG/1
25 TABLET ORAL DAILY
Qty: 30 TABLET | Refills: 0 | Status: SHIPPED | OUTPATIENT
Start: 2022-07-23 | End: 2022-08-05 | Stop reason: SDUPTHER

## 2022-07-23 RX ORDER — LEVOTHYROXINE SODIUM 88 UG/1
88 TABLET ORAL
Qty: 30 TABLET | Refills: 0 | Status: SHIPPED | OUTPATIENT
Start: 2022-07-23 | End: 2022-08-05 | Stop reason: SDUPTHER

## 2022-07-26 ENCOUNTER — TELEPHONE (OUTPATIENT)
Dept: INTERNAL MEDICINE | Facility: CLINIC | Age: 62
End: 2022-07-26

## 2022-07-26 DIAGNOSIS — E78.2 MIXED HYPERLIPIDEMIA: ICD-10-CM

## 2022-07-26 DIAGNOSIS — E03.9 ACQUIRED HYPOTHYROIDISM: ICD-10-CM

## 2022-07-26 DIAGNOSIS — I10 HYPERTENSION, UNSPECIFIED TYPE: ICD-10-CM

## 2022-07-26 RX ORDER — PANTOPRAZOLE SODIUM 40 MG/1
40 TABLET, DELAYED RELEASE ORAL DAILY
Qty: 30 TABLET | Refills: 0 | OUTPATIENT
Start: 2022-07-26

## 2022-07-26 RX ORDER — LEVOTHYROXINE SODIUM 88 UG/1
88 TABLET ORAL
Qty: 30 TABLET | Refills: 0 | OUTPATIENT
Start: 2022-07-26

## 2022-07-26 RX ORDER — LOSARTAN POTASSIUM 25 MG/1
25 TABLET ORAL DAILY
Qty: 30 TABLET | Refills: 0 | OUTPATIENT
Start: 2022-07-26

## 2022-07-26 RX ORDER — ATORVASTATIN CALCIUM 20 MG/1
20 TABLET, FILM COATED ORAL
Qty: 30 TABLET | Refills: 0 | OUTPATIENT
Start: 2022-07-26

## 2022-07-26 RX ORDER — METOPROLOL SUCCINATE 50 MG/1
50 TABLET, EXTENDED RELEASE ORAL DAILY
Qty: 30 TABLET | Refills: 0 | OUTPATIENT
Start: 2022-07-26

## 2022-07-26 NOTE — TELEPHONE ENCOUNTER
Caller: Carmelo Saucedo    Relationship: Self    Best call back number: [phone]    Requested Prescriptions:   Requested Prescriptions     Pending Prescriptions Disp Refills   • atorvastatin (LIPITOR) 20 MG tablet 30 tablet 0     Sig: Take 1 tablet by mouth every night at bedtime.   • levothyroxine (SYNTHROID, LEVOTHROID) 88 MCG tablet 30 tablet 0     Sig: Take 1 tablet by mouth Every Morning Before Breakfast.   • metoprolol succinate XL (TOPROL-XL) 50 MG 24 hr tablet 30 tablet 0     Sig: Take 1 tablet by mouth Daily.   • pantoprazole (PROTONIX) 40 MG EC tablet 30 tablet 0     Sig: Take 1 tablet by mouth Daily.   • losartan (COZAAR) 25 MG tablet 30 tablet 0     Sig: Take 1 tablet by mouth Daily. Replaces lisinopril        Pharmacy where request should be sent: [facility] [address] [place] [address] [phone] [place] [phone] FX     Additional details provided by patient: PATIENT HAS AN APPOINTMENT ON 8/5/22 BUT DOES NOT HAVE ENOUGH MEDICATION TO LAST UNTIL 8/5/22      Does the patient have less than a 3 day supply:  [x] Yes  [] No    Manjeet Hernandez Rep   07/26/22 08:54 EDT

## 2022-07-26 NOTE — TELEPHONE ENCOUNTER
30 day supply sent on 7/23/22 to [facility] pharmacy. Called pt, VM not set up

## 2022-08-05 ENCOUNTER — OFFICE VISIT (OUTPATIENT)
Dept: INTERNAL MEDICINE | Facility: CLINIC | Age: 62
End: 2022-08-05

## 2022-08-05 VITALS
RESPIRATION RATE: 16 BRPM | HEIGHT: 73 IN | TEMPERATURE: 97.4 F | HEART RATE: 66 BPM | WEIGHT: 211 LBS | OXYGEN SATURATION: 95 % | DIASTOLIC BLOOD PRESSURE: 76 MMHG | SYSTOLIC BLOOD PRESSURE: 116 MMHG | BODY MASS INDEX: 27.96 KG/M2

## 2022-08-05 DIAGNOSIS — E03.9 ACQUIRED HYPOTHYROIDISM: ICD-10-CM

## 2022-08-05 DIAGNOSIS — E11.69 TYPE 2 DIABETES MELLITUS WITH OTHER SPECIFIED COMPLICATION, WITHOUT LONG-TERM CURRENT USE OF INSULIN: ICD-10-CM

## 2022-08-05 DIAGNOSIS — N41.1 CHRONIC PROSTATITIS: ICD-10-CM

## 2022-08-05 DIAGNOSIS — Z87.891 PERSONAL HISTORY OF NICOTINE DEPENDENCE: ICD-10-CM

## 2022-08-05 DIAGNOSIS — Z12.2 ENCOUNTER FOR SCREENING FOR LUNG CANCER: ICD-10-CM

## 2022-08-05 DIAGNOSIS — G43.909 MIGRAINE WITHOUT STATUS MIGRAINOSUS, NOT INTRACTABLE, UNSPECIFIED MIGRAINE TYPE: ICD-10-CM

## 2022-08-05 DIAGNOSIS — K21.9 GASTROESOPHAGEAL REFLUX DISEASE, UNSPECIFIED WHETHER ESOPHAGITIS PRESENT: ICD-10-CM

## 2022-08-05 DIAGNOSIS — I10 HYPERTENSION, UNSPECIFIED TYPE: ICD-10-CM

## 2022-08-05 DIAGNOSIS — I10 ESSENTIAL HYPERTENSION: ICD-10-CM

## 2022-08-05 DIAGNOSIS — G47.00 INSOMNIA, UNSPECIFIED TYPE: ICD-10-CM

## 2022-08-05 DIAGNOSIS — Z59.6 POVERTY: ICD-10-CM

## 2022-08-05 DIAGNOSIS — E78.2 MIXED HYPERLIPIDEMIA: ICD-10-CM

## 2022-08-05 DIAGNOSIS — Z00.00 ENCOUNTER FOR SUBSEQUENT ANNUAL WELLNESS VISIT (AWV) IN MEDICARE PATIENT: Primary | ICD-10-CM

## 2022-08-05 LAB
EXPIRATION DATE: ABNORMAL
HBA1C MFR BLD: 6.5 %
Lab: ABNORMAL

## 2022-08-05 PROCEDURE — 1160F RVW MEDS BY RX/DR IN RCRD: CPT | Performed by: NURSE PRACTITIONER

## 2022-08-05 PROCEDURE — 1125F AMNT PAIN NOTED PAIN PRSNT: CPT | Performed by: NURSE PRACTITIONER

## 2022-08-05 PROCEDURE — 3044F HG A1C LEVEL LT 7.0%: CPT | Performed by: NURSE PRACTITIONER

## 2022-08-05 PROCEDURE — 1170F FXNL STATUS ASSESSED: CPT | Performed by: NURSE PRACTITIONER

## 2022-08-05 PROCEDURE — 83036 HEMOGLOBIN GLYCOSYLATED A1C: CPT | Performed by: NURSE PRACTITIONER

## 2022-08-05 PROCEDURE — G0439 PPPS, SUBSEQ VISIT: HCPCS | Performed by: NURSE PRACTITIONER

## 2022-08-05 PROCEDURE — 99214 OFFICE O/P EST MOD 30 MIN: CPT | Performed by: NURSE PRACTITIONER

## 2022-08-05 RX ORDER — PANTOPRAZOLE SODIUM 40 MG/1
40 TABLET, DELAYED RELEASE ORAL DAILY
Qty: 90 TABLET | Refills: 1 | Status: SHIPPED | OUTPATIENT
Start: 2022-08-05 | End: 2023-02-20 | Stop reason: SDUPTHER

## 2022-08-05 RX ORDER — LEVOTHYROXINE SODIUM 88 UG/1
88 TABLET ORAL
Qty: 90 TABLET | Refills: 1 | Status: SHIPPED | OUTPATIENT
Start: 2022-08-05 | End: 2023-02-20 | Stop reason: SDUPTHER

## 2022-08-05 RX ORDER — ATORVASTATIN CALCIUM 20 MG/1
20 TABLET, FILM COATED ORAL
Qty: 90 TABLET | Refills: 1 | Status: SHIPPED | OUTPATIENT
Start: 2022-08-05 | End: 2023-02-20 | Stop reason: SDUPTHER

## 2022-08-05 RX ORDER — TRAZODONE HYDROCHLORIDE 100 MG/1
200 TABLET ORAL
Qty: 180 TABLET | Refills: 1 | Status: SHIPPED | OUTPATIENT
Start: 2022-08-05

## 2022-08-05 RX ORDER — METOPROLOL SUCCINATE 50 MG/1
50 TABLET, EXTENDED RELEASE ORAL DAILY
Qty: 90 TABLET | Refills: 1 | Status: SHIPPED | OUTPATIENT
Start: 2022-08-05 | End: 2023-02-20 | Stop reason: SDUPTHER

## 2022-08-05 RX ORDER — HYDROCHLOROTHIAZIDE 12.5 MG/1
12.5 CAPSULE, GELATIN COATED ORAL DAILY
Qty: 90 CAPSULE | Refills: 1 | Status: SHIPPED | OUTPATIENT
Start: 2022-08-05 | End: 2023-02-20 | Stop reason: SDUPTHER

## 2022-08-05 RX ORDER — TOPIRAMATE 50 MG/1
100 TABLET, FILM COATED ORAL
Qty: 180 TABLET | Refills: 1 | OUTPATIENT
Start: 2022-08-05 | End: 2022-08-25

## 2022-08-05 RX ORDER — TAMSULOSIN HYDROCHLORIDE 0.4 MG/1
2 CAPSULE ORAL
Qty: 180 CAPSULE | Refills: 1 | Status: SHIPPED | OUTPATIENT
Start: 2022-08-05

## 2022-08-05 RX ORDER — LOSARTAN POTASSIUM 25 MG/1
25 TABLET ORAL DAILY
Qty: 90 TABLET | Refills: 1 | Status: SHIPPED | OUTPATIENT
Start: 2022-08-05 | End: 2023-02-20 | Stop reason: SDUPTHER

## 2022-08-05 SDOH — ECONOMIC STABILITY - INCOME SECURITY: LOW INCOME: Z59.6

## 2022-08-05 NOTE — PROGRESS NOTES
The ABCs of the Annual Wellness Visit  Subsequent Medicare Wellness Visit    Chief Complaint   Patient presents with   • Medicare Wellness-subsequent     Pt would like referral to , needs help with personal issues at home      Subjective    History of Present Illness:  Carmelo Saucedo is a 62 y.o. male who presents for a Subsequent Medicare Wellness Visit.  -Financial struggles: cannot afford all copays, food in his budget is not diabetic friendly  -His fasting glucose have been running ~120. He only checks his glucose once per day to try and save on test strips due to cost.   -He remains on Flomax, still awakening 2-3x/ night   -He continues to struggle with pain in his feet which he follows pain medication.     The following portions of the patient's history were reviewed and   updated as appropriate: allergies, current medications, past family history, past medical history, past social history, past surgical history and problem list.    Compared to one year ago, the patient feels his physical   health is the same.    Compared to one year ago, the patient feels his mental   health is better.    Recent Hospitalizations:  He was not admitted to the hospital during the last year.       Current Medical Providers:  Patient Care Team:  Karlene Humphries APRN as PCP - General (Family Medicine)    Outpatient Medications Prior to Visit   Medication Sig Dispense Refill   • aspirin 81 MG chewable tablet Chew 162 mg Daily.     • gabapentin (NEURONTIN) 600 MG tablet Take 600 mg by mouth Every 6 (Six) Hours.     • glucose blood test strip Use as instructed to check blood sugar three times daily 200 each 6   • glucose monitor monitoring kit 1 each Daily. Patient requests One Touch Glucose Meter.  Please substitute covered meter per insurance if needed. 1 each 0   • HYDROcodone-acetaminophen (NORCO) 7.5-325 MG per tablet Take 1 tablet by mouth 3 (Three) Times a Day.  0   • ipratropium-albuterol (Combivent  "Respimat)  MCG/ACT inhaler Inhale 1 puff 4 (Four) Times a Day. 12 g 1   • ipratropium-albuterol (DUONEB) 0.5-2.5 mg/3 ml nebulizer Take 3 mL by nebulization Every 6 (Six) Hours. 120 vial 3   • sildenafil (REVATIO) 20 MG tablet TAKE TWO TO THREE TABLETS BY MOUTH ONE HOUR PRIOR TO INTERCOURSE 10 tablet 0   • acyclovir (ZOVIRAX) 400 MG tablet Take 1 tablet by mouth 2 (Two) Times a Day. Take no more than 5 doses a day. 180 tablet 2   • atorvastatin (LIPITOR) 20 MG tablet Take 1 tablet by mouth every night at bedtime. 30 tablet 0   • Cholecalciferol (VITAMIN D3) 125 MCG (5000 UT) capsule capsule Take 1 capsule by mouth Daily. 30 capsule 5   • cyclobenzaprine (FLEXERIL) 10 MG tablet Take 1 tablet by mouth 3 (Three) Times a Day As Needed for Muscle Spasms. 20 tablet 0   • hydroCHLOROthiazide (MICROZIDE) 12.5 MG capsule Take 1 capsule by mouth Daily. 90 capsule 1   • Insulin Glargine (LANTUS SOLOSTAR) 100 UNIT/ML injection pen Inject 10 Units under the skin into the appropriate area as directed Every Night. 3 mL 0   • Insulin Pen Needle (Pen Needles 3/16\") 31G X 5 MM misc 1 each Every Night. 90 each 3   • levothyroxine (SYNTHROID, LEVOTHROID) 88 MCG tablet Take 1 tablet by mouth Every Morning Before Breakfast. 30 tablet 0   • loratadine (CLARITIN) 10 MG tablet Take 1 tablet by mouth Daily. 30 tablet 2   • losartan (COZAAR) 25 MG tablet Take 1 tablet by mouth Daily. Replaces lisinopril 30 tablet 0   • metFORMIN (GLUCOPHAGE) 1000 MG tablet Take 1 tablet by mouth 2 (Two) Times a Day With Meals. 180 tablet 3   • metoprolol succinate XL (TOPROL-XL) 50 MG 24 hr tablet Take 1 tablet by mouth Daily. 30 tablet 0   • nystatin-triamcinolone (MYCOLOG II) 452825-0.1 UNIT/GM-% cream Apply topically to the appropriate area as directed 2 (Two) Times a Day for 5 days.     • pantoprazole (PROTONIX) 40 MG EC tablet Take 1 tablet by mouth Daily. 30 tablet 0   • tamsulosin (FLOMAX) 0.4 MG capsule 24 hr capsule Take 2 capsules by mouth " every night at bedtime. 180 capsule 0   • topiramate (TOPAMAX) 50 MG tablet Take 2 tablets by mouth every night at bedtime. 180 tablet 0   • traZODone (DESYREL) 100 MG tablet TAKE TWO TABLETS BY MOUTH AT BEDTIME 180 tablet 0   • azithromycin (ZITHROMAX) 250 MG tablet Take 2 tablets the first day, then 1 tablet daily for 4 days. 6 tablet 0   • guaiFENesin (Mucinex) 600 MG 12 hr tablet Take 1 tablet by mouth 2 (Two) Times a Day. 30 tablet 0   • promethazine-dextromethorphan (PROMETHAZINE-DM) 6.25-15 MG/5ML syrup Take 5 mL by mouth 4 (Four) Times a Day As Needed for Cough. 120 mL 0     No facility-administered medications prior to visit.       Opioid medication/s are on active medication list.  and I have evaluated his active treatment plan and pain score trends (see table).  Vitals:    08/05/22 1355   PainSc:   6     I have reviewed the chart for potential of high risk medication and harmful drug interactions in the elderly.            Aspirin is on active medication list. Aspirin use is indicated based on review of current medical condition/s. Pros and cons of this therapy have been discussed today. Benefits of this medication outweigh potential harm.  Patient has been encouraged to continue taking this medication.  .  -162mg asa daily     Patient Active Problem List   Diagnosis   • Diabetes mellitus, type 2 (HCC)   • Diabetic peripheral neuropathy (HCC)   • Gastroesophageal reflux disease with esophagitis   • Hypertension   • Hypothyroidism   • Low back pain   • Congestive cardiomyopathy (HCC)   • Chronic obstructive pulmonary disease (HCC)   • Anxiety disorder   • Arthritis   • CHF (congestive heart failure) ([facility])   • Chronic pain   • Chronic pancreatitis ([facility])   • Depression   • Osteoarthritis   • Schizoaffective disorder, bipolar type ([facility])   • Tobacco use   • H/O tobacco use, presenting hazards to health   • Migraine without status migrainosus, not intractable   • Hyperlipidemia   • Balanitis   • Insomnia   •  "Smoker   • Floppy eyelid syndrome   • Floaters   • Epiretinal membrane (ERM) of right eye   • Dry eyes, bilateral   • Cyst, eyelid, left   • Combined form of age-related cataract, both eyes   • Bilateral hypertensive retinopathy   • Stage 3a chronic kidney disease (HCC)   • Benign prostatic hyperplasia with nocturia     Advance Care Planning  Advance Directive is not on file.  ACP discussion was held with the patient during this visit. Patient has an advance directive (not in EMR), copy requested.          Objective    Vitals:    08/05/22 1355   BP: 116/76   Pulse: 66   Resp: 16   Temp: 97.4 °F (36.3 °C)   SpO2: 95%   Weight: 95.7 kg (211 lb)   Height: 185.4 cm (73\")   PainSc:   6     Estimated body mass index is 27.84 kg/m² as calculated from the following:    Height as of this encounter: 185.4 cm (73\").    Weight as of this encounter: 95.7 kg (211 lb).    BMI is >= 25 and <30. (Overweight) The following options were offered after discussion;: nutrition counseling/recommendations      Does the patient have evidence of cognitive impairment? Yes    Physical Exam  Lab Results   Component Value Date    HGBA1C 6.4 01/13/2023            HEALTH RISK ASSESSMENT    Smoking Status:  Social History     Tobacco Use   Smoking Status Every Day   • Packs/day: 1.50   • Years: 50.00   • Pack years: 75.00   • Types: Cigarettes   Smokeless Tobacco Never     Alcohol Consumption:  Social History     Substance and Sexual Activity   Alcohol Use No     Fall Risk Screen:    STEADI Fall Risk Assessment has not been completed.    Depression Screening:  PHQ-2/PHQ-9 Depression Screening 8/5/2022   Retired PHQ-9 Total Score -   Retired Total Score -   Little Interest or Pleasure in Doing Things 0-->not at all   Feeling Down, Depressed or Hopeless 0-->not at all   PHQ-9: Brief Depression Severity Measure Score 0       Health Habits and Functional and Cognitive Screening:  Functional & Cognitive Status 8/5/2022   Do you have difficulty preparing " food and eating? No   Do you have difficulty bathing yourself, getting dressed or grooming yourself? No   Do you have difficulty using the toilet? No   Do you have difficulty moving around from place to place? No   Do you have trouble with steps or getting out of a bed or a chair? No   Current Diet Well Balanced Diet   Dental Exam Not up to date   Eye Exam Up to date   Exercise (times per week) 2 times per week   Current Exercises Include House Cleaning;No Regular Exercise   Current Exercise Activities Include -   Do you need help using the phone?  No   Are you deaf or do you have serious difficulty hearing?  No   Do you need help with transportation? No   Do you need help shopping? No   Do you need help preparing meals?  No   Do you need help with housework?  No   Do you need help with laundry? No   Do you need help taking your medications? No   Do you need help managing money? No   Do you ever drive or ride in a car without wearing a seat belt? No   Have you felt unusual stress, anger or loneliness in the last month? Yes   Who do you live with? Spouse   If you need help, do you have trouble finding someone available to you? No   Have you been bothered in the last four weeks by sexual problems? No   Do you have difficulty concentrating, remembering or making decisions? No       Age-appropriate Screening Schedule:  Refer to the list below for future screening recommendations based on patient's age, sex and/or medical conditions. Orders for these recommended tests are listed in the plan section. The patient has been provided with a written plan.    Health Maintenance   Topic Date Due   • LIPID PANEL  06/24/2021   • URINE MICROALBUMIN  03/24/2022   • INFLUENZA VACCINE  08/01/2022   • ZOSTER VACCINE (1 of 2) 08/05/2023 (Originally 9/29/2008)   • HEMOGLOBIN A1C  07/13/2023   • DIABETIC EYE EXAM  09/14/2023   • DIABETIC FOOT EXAM  01/13/2024   • TDAP/TD VACCINES  Discontinued              Assessment & Plan   CMS  Preventative Services Quick Reference  Risk Factors Identified During Encounter  Cardiovascular Disease  Chronic Pain   Obesity/Overweight   The above risks/problems have been discussed with the patient.  Follow up actions/plans if indicated are seen below in the Assessment/Plan Section.  Pertinent information has been shared with the patient in the After Visit Summary.    Diagnoses and all orders for this visit:    1. Encounter for subsequent annual wellness visit (AWV) in Medicare patient (Primary)    2. Migraine without status migrainosus, not intractable, unspecified migraine type  -     Discontinue: topiramate (TOPAMAX) 50 MG tablet; Take 2 tablets by mouth every night at bedtime.  Dispense: 180 tablet; Refill: 1    3. Chronic prostatitis  Comments:  Start finasteride to see if it helps otherwise will switch Flomax to uroxatral.  Orders:  -     tamsulosin (FLOMAX) 0.4 MG capsule 24 hr capsule; Take 2 capsules by mouth every night at bedtime.  Dispense: 180 capsule; Refill: 1    4. Type 2 diabetes mellitus with other specified complication, without long-term current use of insulin (HCC)  -     metFORMIN (GLUCOPHAGE) 1000 MG tablet; Take 1 tablet by mouth 2 (Two) Times a Day With Meals.  Dispense: 180 tablet; Refill: 1  -     POC Glycosylated Hemoglobin (Hb A1C)    5. Hypertension, unspecified type  -     metoprolol succinate XL (TOPROL-XL) 50 MG 24 hr tablet; Take 1 tablet by mouth Daily.  Dispense: 90 tablet; Refill: 1  -     losartan (COZAAR) 25 MG tablet; Take 1 tablet by mouth Daily. Replaces lisinopril  Dispense: 90 tablet; Refill: 1    6. Acquired hypothyroidism  -     levothyroxine (SYNTHROID, LEVOTHROID) 88 MCG tablet; Take 1 tablet by mouth Every Morning Before Breakfast.  Dispense: 90 tablet; Refill: 1    7. Essential hypertension  -     hydroCHLOROthiazide (MICROZIDE) 12.5 MG capsule; Take 1 capsule by mouth Daily.  Dispense: 90 capsule; Refill: 1    8. Mixed hyperlipidemia  -     atorvastatin  (LIPITOR) 20 MG tablet; Take 1 tablet by mouth every night at bedtime.  Dispense: 90 tablet; Refill: 1    9. Insomnia, unspecified type  -     traZODone (DESYREL) 100 MG tablet; Take 2 tablets by mouth every night at bedtime.  Dispense: 180 tablet; Refill: 1    10. Gastroesophageal reflux disease, unspecified whether esophagitis present  -     pantoprazole (PROTONIX) 40 MG EC tablet; Take 1 tablet by mouth Daily.  Dispense: 90 tablet; Refill: 1    11. Encounter for screening for lung cancer  -      CT Chest Low Dose Cancer Screening WO; Future    12. Personal history of nicotine dependence   -      CT Chest Low Dose Cancer Screening WO; Future    13. Poverty  -     Ambulatory Referral to Social Work        Follow Up:   Return in about 3 months (around 11/5/2022) for Recheck.     An After Visit Summary and PPPS were made available to the patient.          I spent 30 minutes caring for Carmelo on this date of service. This time includes time spent by me in the following activities:preparing for the visit, reviewing tests, performing a medically appropriate examination and/or evaluation , counseling and educating the patient/family/caregiver, ordering medications, tests, or procedures, referring and communicating with other health care professionals , documenting information in the medical record, independently interpreting results and communicating that information with the patient/family/caregiver and care coordination

## 2022-08-24 ENCOUNTER — TELEPHONE (OUTPATIENT)
Dept: INTERNAL MEDICINE | Facility: CLINIC | Age: 62
End: 2022-08-24

## 2022-08-24 ENCOUNTER — APPOINTMENT (OUTPATIENT)
Dept: CT IMAGING | Facility: HOSPITAL | Age: 62
End: 2022-08-24

## 2022-08-25 ENCOUNTER — TELEPHONE (OUTPATIENT)
Dept: INTERNAL MEDICINE | Facility: CLINIC | Age: 62
End: 2022-08-25

## 2022-08-25 PROCEDURE — U0005 INFEC AGEN DETEC AMPLI PROBE: HCPCS | Performed by: NURSE PRACTITIONER

## 2022-08-25 PROCEDURE — U0004 COV-19 TEST NON-CDC HGH THRU: HCPCS | Performed by: NURSE PRACTITIONER

## 2022-08-26 DIAGNOSIS — B00.9 HSV-2 INFECTION: ICD-10-CM

## 2022-08-26 DIAGNOSIS — E11.69 TYPE 2 DIABETES MELLITUS WITH OTHER SPECIFIED COMPLICATION, WITHOUT LONG-TERM CURRENT USE OF INSULIN: ICD-10-CM

## 2022-08-26 RX ORDER — ACYCLOVIR 400 MG/1
400 TABLET ORAL 2 TIMES DAILY
Qty: 180 TABLET | Refills: 0 | Status: SHIPPED | OUTPATIENT
Start: 2022-08-26 | End: 2022-12-03

## 2022-08-26 NOTE — TELEPHONE ENCOUNTER
Caller: Carmelo Saucedo    Relationship: Self    Best call back number:    [phone]    Requested Prescriptions:   Requested Prescriptions     Pending Prescriptions Disp Refills   • acyclovir (ZOVIRAX) 400 MG tablet 180 tablet 2     Sig: Take 1 tablet by mouth 2 (Two) Times a Day. Take no more than 5 doses a day.            Pharmacy where request should be sent: [facility] [address] [place], [place] [phone] [place], [place] [phone] FX     Additional details provided by patient:     PATIENT STATED HE HAS AT LEAST A (3) DAY SUPPLY OF THIS MEDICATION    PATIENT ALSO REQUESTED THE MEDICATION BE FILLED AS A (90) DAY SUPPLY    Does the patient have less than a 3 day supply:  [] Yes  [x] No    Manjeet Wang Rep   08/26/22 09:09 TANI HANDY

## 2022-08-29 ENCOUNTER — HOSPITAL ENCOUNTER (OUTPATIENT)
Dept: CT IMAGING | Facility: HOSPITAL | Age: 62
Discharge: HOME | End: 2022-08-29
Admitting: NURSE PRACTITIONER

## 2022-08-29 DIAGNOSIS — Z12.2 ENCOUNTER FOR SCREENING FOR LUNG CANCER: ICD-10-CM

## 2022-08-29 DIAGNOSIS — Z87.891 PERSONAL HISTORY OF NICOTINE DEPENDENCE: ICD-10-CM

## 2022-08-29 PROCEDURE — 71271 CT THORAX LUNG CANCER SCR C-: CPT

## 2022-09-08 ENCOUNTER — OFFICE VISIT (OUTPATIENT)
Dept: INTERNAL MEDICINE | Facility: CLINIC | Age: 62
End: 2022-09-08

## 2022-09-08 ENCOUNTER — LAB (OUTPATIENT)
Dept: LAB | Facility: HOSPITAL | Age: 62
End: 2022-09-08

## 2022-09-08 VITALS
SYSTOLIC BLOOD PRESSURE: 120 MMHG | RESPIRATION RATE: 16 BRPM | OXYGEN SATURATION: 95 % | DIASTOLIC BLOOD PRESSURE: 78 MMHG | HEART RATE: 70 BPM | BODY MASS INDEX: 27.7 KG/M2 | HEIGHT: 73 IN | WEIGHT: 209 LBS

## 2022-09-08 DIAGNOSIS — R53.83 FATIGUE, UNSPECIFIED TYPE: ICD-10-CM

## 2022-09-08 DIAGNOSIS — Z00.00 ROUTINE MEDICAL EXAM: ICD-10-CM

## 2022-09-08 DIAGNOSIS — G62.9 PERIPHERAL POLYNEUROPATHY: ICD-10-CM

## 2022-09-08 DIAGNOSIS — R53.83 FATIGUE, UNSPECIFIED TYPE: Primary | ICD-10-CM

## 2022-09-08 DIAGNOSIS — E11.69 TYPE 2 DIABETES MELLITUS WITH OTHER SPECIFIED COMPLICATION, WITHOUT LONG-TERM CURRENT USE OF INSULIN: ICD-10-CM

## 2022-09-08 PROCEDURE — 85027 COMPLETE CBC AUTOMATED: CPT

## 2022-09-08 PROCEDURE — 99213 OFFICE O/P EST LOW 20 MIN: CPT | Performed by: NURSE PRACTITIONER

## 2022-09-08 PROCEDURE — 36415 COLL VENOUS BLD VENIPUNCTURE: CPT

## 2022-09-08 PROCEDURE — 82607 VITAMIN B-12: CPT

## 2022-09-08 NOTE — PROGRESS NOTES
Follow Up Office Visit      Date: 2022   Patient Name: Carmelo Saucedo  : 1960   MRN: [id number]     Chief Complaint:    Chief Complaint   Patient presents with   • Foot Problem     Pt states having issues with feet and hands, burning and pain   • Hand Problem   • Fatigue       History of Present Illness: Carmelo Saucedo is a 61 y.o. male who is here today to follow up. He describes bilateral itchiness of hands, bilateral burning pain in feet, fatigue, and feeling cold all the time. He reports newly having issues with his hands such as buttoning his shirt or holding onto a coffee cup without dropping it. His A1C was greatly improved from April compared to August 10.1 to 6.5. He reports that the feet burning has been present since he was hospitalized with lithium toxicity, but other symptoms are new.         Subjective      Review of Systems:   Review of Systems   Constitutional: Positive for unexpected weight loss (>10lbs since April, denies decrease in appetite ). Negative for chills and fatigue.   Respiratory: Positive for cough and shortness of breath. Negative for wheezing.    Cardiovascular: Positive for chest pain. Negative for palpitations and leg swelling.   Endocrine: Positive for cold intolerance. Negative for heat intolerance, polydipsia, polyphagia and polyuria.   Neurological: Positive for weakness, numbness, headache and memory problem. Negative for dizziness and confusion.       I have reviewed the patients family history, social history, past medical history, past surgical history and have updated it as appropriate.     Medications:     Current Outpatient Medications:   •  acyclovir (ZOVIRAX) 400 MG tablet, Take 1 tablet by mouth 2 (Two) Times a Day. Take no more than 5 doses a day., Disp: 180 tablet, Rfl: 0  •  aspirin 81 MG chewable tablet, Chew 162 mg Daily., Disp: , Rfl:   •  atorvastatin (LIPITOR) 20 MG tablet, Take 1 tablet by mouth every night at bedtime., Disp: 90 tablet,  "Rfl: 1  •  gabapentin (NEURONTIN) 600 MG tablet, Take 600 mg by mouth Every 6 (Six) Hours., Disp: , Rfl:   •  glucose blood test strip, Use as instructed to check blood sugar three times daily, Disp: 200 each, Rfl: 6  •  glucose monitor monitoring kit, 1 each Daily. Patient requests One Touch Glucose Meter.  Please substitute covered meter per insurance if needed., Disp: 1 each, Rfl: 0  •  hydroCHLOROthiazide (MICROZIDE) 12.5 MG capsule, Take 1 capsule by mouth Daily., Disp: 90 capsule, Rfl: 1  •  HYDROcodone-acetaminophen (NORCO) 7.5-325 MG per tablet, Take 1 tablet by mouth 3 (Three) Times a Day., Disp: , Rfl: 0  •  Insulin Glargine (LANTUS SOLOSTAR) 100 UNIT/ML injection pen, Inject 10 Units under the skin into the appropriate area as directed Every Night., Disp: 9 mL, Rfl: 1  •  Insulin Pen Needle (Pen Needles 3/16\") 31G X 5 MM misc, 1 each Every Night., Disp: 90 each, Rfl: 3  •  ipratropium-albuterol (Combivent Respimat)  MCG/ACT inhaler, Inhale 1 puff 4 (Four) Times a Day., Disp: 12 g, Rfl: 1  •  ipratropium-albuterol (DUONEB) 0.5-2.5 mg/3 ml nebulizer, Take 3 mL by nebulization Every 6 (Six) Hours., Disp: 120 vial, Rfl: 3  •  levothyroxine (SYNTHROID, LEVOTHROID) 88 MCG tablet, Take 1 tablet by mouth Every Morning Before Breakfast., Disp: 90 tablet, Rfl: 1  •  losartan (COZAAR) 25 MG tablet, Take 1 tablet by mouth Daily. Replaces lisinopril, Disp: 90 tablet, Rfl: 1  •  metFORMIN (GLUCOPHAGE) 1000 MG tablet, Take 1 tablet by mouth 2 (Two) Times a Day With Meals., Disp: 180 tablet, Rfl: 1  •  metoprolol succinate XL (TOPROL-XL) 50 MG 24 hr tablet, Take 1 tablet by mouth Daily., Disp: 90 tablet, Rfl: 1  •  pantoprazole (PROTONIX) 40 MG EC tablet, Take 1 tablet by mouth Daily., Disp: 90 tablet, Rfl: 1  •  sildenafil (REVATIO) 20 MG tablet, TAKE TWO TO THREE TABLETS BY MOUTH ONE HOUR PRIOR TO INTERCOURSE, Disp: 10 tablet, Rfl: 0  •  tamsulosin (FLOMAX) 0.4 MG capsule 24 hr capsule, Take 2 capsules by mouth " "every night at bedtime., Disp: 180 capsule, Rfl: 1  •  traZODone (DESYREL) 100 MG tablet, Take 2 tablets by mouth every night at bedtime., Disp: 180 tablet, Rfl: 1    Allergies:   Allergies   Allergen Reactions   • Lisinopril Cough       Objective     Physical Exam: Please see above  Vital Signs:   Vitals:    09/08/22 1043   BP: 120/78   Pulse: 70   Resp: 16   SpO2: 95%   Weight: 94.8 kg (209 lb)   Height: 185.4 cm (73\")   PainSc: 0-No pain     Body mass index is 27.57 kg/m².    Physical Exam  Vitals and nursing note reviewed.   Constitutional:       Appearance: Normal appearance. He is normal weight.   HENT:      Head: Normocephalic and atraumatic.      Mouth/Throat:      Mouth: Mucous membranes are moist.      Pharynx: Oropharynx is clear. No oropharyngeal exudate or posterior oropharyngeal erythema.   Eyes:      Pupils: Pupils are equal, round, and reactive to light.   Cardiovascular:      Rate and Rhythm: Normal rate and regular rhythm.      Heart sounds: Normal heart sounds.   Pulmonary:      Effort: Pulmonary effort is normal. No respiratory distress.      Breath sounds: Normal breath sounds.   Musculoskeletal:      Right lower leg: No edema.      Left lower leg: No edema.   Lymphadenopathy:      Cervical: No cervical adenopathy.   Skin:     General: Skin is warm and dry.      Capillary Refill: Capillary refill takes less than 2 seconds.   Neurological:      Mental Status: He is alert and oriented to person, place, and time.         Procedures    Results:   Imaging:     Labs:       Assessment / Plan      Assessment/Plan:   Diagnoses and all orders for this visit:    1. Fatigue, unspecified type (Primary)  -     CBC (No Diff); Future  -     Vitamin B12; Future    2. Peripheral polyneuropathy  -     Vitamin B12; Future    3. Routine medical exam  -I wanted to check an A1C and vitamin D as well, but had to cancel these orders because they were not covered and it would cause financial hardship to receive a bill " for these        Follow Up:   Return in about 1 week (around 9/15/2022).    RADHA Campuzano  [facility] Internal [facility] [place]

## 2022-09-09 LAB
DEPRECATED RDW RBC AUTO: 46.5 FL (ref 37–54)
ERYTHROCYTE [DISTWIDTH] IN BLOOD BY AUTOMATED COUNT: 13 % (ref 12.3–15.4)
HCT VFR BLD AUTO: 49 % (ref 37.5–51)
HGB BLD-MCNC: 15.9 G/DL (ref 13–17.7)
MCH RBC QN AUTO: 31.1 PG (ref 26.6–33)
MCHC RBC AUTO-ENTMCNC: 32.4 G/DL (ref 31.5–35.7)
MCV RBC AUTO: 95.9 FL (ref 79–97)
PLATELET # BLD AUTO: 232 10*3/MM3 (ref 140–450)
PMV BLD AUTO: 13 FL (ref 6–12)
RBC # BLD AUTO: 5.11 10*6/MM3 (ref 4.14–5.8)
VIT B12 BLD-MCNC: 556 PG/ML (ref 211–946)
WBC NRBC COR # BLD: 10.55 10*3/MM3 (ref 3.4–10.8)

## 2022-09-12 DIAGNOSIS — E11.40 TYPE 2 DIABETES MELLITUS WITH DIABETIC NEUROPATHY, WITHOUT LONG-TERM CURRENT USE OF INSULIN: Primary | ICD-10-CM

## 2022-09-14 ENCOUNTER — TELEPHONE (OUTPATIENT)
Dept: INTERNAL MEDICINE | Facility: CLINIC | Age: 62
End: 2022-09-14

## 2022-09-14 DIAGNOSIS — Z59.6 POVERTY: Primary | ICD-10-CM

## 2022-09-14 SDOH — ECONOMIC STABILITY - INCOME SECURITY: LOW INCOME: Z59.6

## 2022-09-15 ENCOUNTER — TELEPHONE (OUTPATIENT)
Dept: INTERNAL MEDICINE | Facility: CLINIC | Age: 62
End: 2022-09-15

## 2022-09-15 ENCOUNTER — REFERRAL TRIAGE (OUTPATIENT)
Dept: CASE MANAGEMENT | Facility: OTHER | Age: 62
End: 2022-09-15

## 2022-09-15 ENCOUNTER — PATIENT OUTREACH (OUTPATIENT)
Dept: CASE MANAGEMENT | Facility: OTHER | Age: 62
End: 2022-09-15

## 2022-09-15 NOTE — OUTREACH NOTE
SDOH updated and reviewed with the patient during this program:  Financial Resource Strain: Low Risk    • Difficulty of Paying Living Expenses: Not very hard      Food Insecurity: No Food Insecurity   • Worried About Running Out of Food in the Last Year: Never true   • Ran Out of Food in the Last Year: Never true      Transportation Needs: No Transportation Needs   • Lack of Transportation (Medical): No   • Lack of Transportation (Non-Medical): No      Housing Stability: Low Risk    • Unable to Pay for Housing in the Last Year: No   • Number of Places Lived in the Last Year: 1   • Unstable Housing in the Last Year: No      Patient Outreach  SW contacted pt to discuss community resources. Pt denies having any needs at present. SW encouraged him to contact SW if assistance is needed in the future.    LG RODGERS -   Ambulatory Case Management    9/15/2022, 12:50 EDT

## 2022-09-15 NOTE — TELEPHONE ENCOUNTER
Caller: Carmelo Saucedo    Relationship: Self    Best call back number: [phone]    Caller requesting test results: YES    What test was performed: CHEST SCAN    When was the test performed: 8.29.22    Where was the test performed: IN OFFICE    Additional notes: PLEASE CALL PATIENT TO DISCUSS THE RESULTS.    THANK YOU.

## 2022-09-15 NOTE — TELEPHONE ENCOUNTER
"----- Message from Jess Rosen RN sent at 9/13/2022  8:21 AM EDT -----  He sounds best suited for social work. Will you please place another referral to them?   ----- Message -----  From: Karlene Humphries APRN  Sent: 9/12/2022   9:28 PM EDT  To: Jess Rosen RN    He requested a referral back in August when I saw him for a medicare wellness visit related to \"personal issues at home.\" At that time he did tell me he could not afford all of his medications due to copay cost and also foods were an issue due to financial hardship. He was in earlier this month for an acute visit related to fatigue. He told me he never heard from anyone, and looking back at my note/ referral order I realize that was a SW consult and not case management one. But either way, no one has been in touch with him. Let me know what you recommend (continuing sw, place order for you, or other ideas that may be helpful to him). I appreciate your assistance!        ----- Message -----  From: Jess Rosen RN  Sent: 9/8/2022  12:19 PM EDT  To: RADHA Gastelum    I have not spoken with him before. Is there something specific I can help with?   ----- Message -----  From: Karlene Humphries APRN  Sent: 9/8/2022  12:07 PM EDT  To: Elisa Pedro MA, Jess Rosen RN    [facility], have you been in touch with him?      Elisa, is there certain paperwork that I need to fill out to get him diabetic shoes?           "

## 2022-09-16 ENCOUNTER — PATIENT OUTREACH (OUTPATIENT)
Dept: CASE MANAGEMENT | Facility: OTHER | Age: 62
End: 2022-09-16

## 2022-09-16 NOTE — OUTREACH NOTE
Patient Outreach    SW returned call to pt. Pt asked about how he could go about getting more groceries. He reports that he is not eligable for foodstamps and that he uses [employer] when needed. SW provided information on senior commodities program. Pt denies additional needs.    LG RODGERS -   Ambulatory Case Management    9/16/2022, 13:21 EDT

## 2022-10-27 DIAGNOSIS — E11.69 TYPE 2 DIABETES MELLITUS WITH OTHER SPECIFIED COMPLICATION, WITHOUT LONG-TERM CURRENT USE OF INSULIN: ICD-10-CM

## 2022-10-27 NOTE — TELEPHONE ENCOUNTER
"Caller: Carmelo Saucedo    Relationship: Self    Best call back number: [phone]    Requested Prescriptions:   Requested Prescriptions     Pending Prescriptions Disp Refills   • Insulin Pen Needle (Pen Needles 3/16\") 31G X 5 MM misc 90 each 3     Si each Every Night.        Pharmacy where request should be sent: [facility] [address] [place], [place] [phone] [place] [phone] FX     90 DAY SUPPLY    Does the patient have less than a 3 day supply:  [] Yes  [x] No    Manjeet Otero Rep   10/27/22 17:27 EDT       "

## 2022-10-28 RX ORDER — PEN NEEDLE, DIABETIC 30 GX5/16"
1 NEEDLE, DISPOSABLE MISCELLANEOUS NIGHTLY
Qty: 90 EACH | Refills: 3 | Status: SHIPPED | OUTPATIENT
Start: 2022-10-28

## 2022-10-31 ENCOUNTER — TELEPHONE (OUTPATIENT)
Dept: CASE MANAGEMENT | Facility: OTHER | Age: 62
End: 2022-10-31

## 2022-11-29 DIAGNOSIS — B00.9 HSV-2 INFECTION: ICD-10-CM

## 2022-12-01 NOTE — TELEPHONE ENCOUNTER
PATIENT CALLED IN REQUESTING THE STATUS ON THE REFILL REQUEST FOR THE MEDICATION LISTED. PATIENT STATED HE ONLY HAS 2 DAYS LEFT

## 2022-12-03 RX ORDER — ACYCLOVIR 400 MG/1
TABLET ORAL
Qty: 180 TABLET | Refills: 0 | Status: SHIPPED | OUTPATIENT
Start: 2022-12-03 | End: 2023-03-01 | Stop reason: SDUPTHER

## 2023-01-10 ENCOUNTER — NURSE TRIAGE (OUTPATIENT)
Dept: CALL CENTER | Facility: HOSPITAL | Age: 63
End: 2023-01-10
Payer: MEDICARE

## 2023-01-10 NOTE — TELEPHONE ENCOUNTER
HUB call Unable to reach PCP office.    Wantingato schedule an appt.    Having no new symptoms. Says he has been having symptoms for a long time. Stays cold all the time.  Fingers and feet get numb. He has diabetes This numbness is not new.  Takes Metformin and Gabapentin  Has emphysema. Denies chest pain  Has nerve pain, takes Gabapentin  Takes Lortab for chronic pain    Needs refills of medication before long, has all medications for now.  PCP does not refill Gabapentin or Lortab    Discussed when to seek EMS. Caller says he knows when to got to the ER.    Reason for Disposition  • Caller requesting an appointment, triage offered and declined    Additional Information  • Negative: Lab calling with strep throat test results and triager can call in prescription  • Negative: Lab calling with urinalysis test results and triager can call in prescription  • Negative: Medication questions  • Negative: ED call to PCP  • Negative: Physician call to PCP  • Negative: Call about patient who is currently hospitalized  • Negative: Lab or radiology calling with CRITICAL test results  • Negative: [1] Prescription not at pharmacy AND [2] was prescribed today by PCP  • Negative: [1] Follow-up call from patient regarding patient's clinical status AND [2] information urgent  • Negative: [1] Caller requests to speak ONLY to PCP AND [2] URGENT question  • Negative: [1] Caller requests to speak to PCP now AND [2] won't tell us reason for call  (Exception: if 10 pm to 6 am, caller must first discuss reason for the call)  • Negative: Notification of hospital admission  • Negative: Notification of death  • Negative: Caller requesting lab results  • Negative: Lab or radiology calling with test results  • Negative: [1] Follow-up call from patient regarding patient's clinical status AND [2] information NON-URGENT  • Negative: [1] Caller requests to speak ONLY to PCP AND [2] NON-URGENT question    Answer Assessment - Initial Assessment  Questions  1. REASON FOR CALL or QUESTION: \"What is your reason for calling today?\" or \"How can I best  help you?\" or \"What question do you have that I can help answer?\"  Patient wanting to schedule an appt for long term ongoing issues.   Attempt to [facility] PCP office for appt. No answer. Advised caller will send not to MD office asking to return call for an appt.  2. CALLER: Document the source of call. (e.g., laboratory, patient).      *No Answer*    Protocols used: PCP CALL - NO TRIAGE-ADULT-

## 2023-01-13 ENCOUNTER — OFFICE VISIT (OUTPATIENT)
Dept: INTERNAL MEDICINE | Facility: CLINIC | Age: 63
End: 2023-01-13
Payer: MEDICARE

## 2023-01-13 VITALS
DIASTOLIC BLOOD PRESSURE: 68 MMHG | HEART RATE: 69 BPM | HEIGHT: 73 IN | BODY MASS INDEX: 27.57 KG/M2 | TEMPERATURE: 97.8 F | SYSTOLIC BLOOD PRESSURE: 106 MMHG | WEIGHT: 208 LBS | OXYGEN SATURATION: 98 % | RESPIRATION RATE: 16 BRPM

## 2023-01-13 DIAGNOSIS — M17.12 PRIMARY OSTEOARTHRITIS OF LEFT KNEE: ICD-10-CM

## 2023-01-13 DIAGNOSIS — Z23 NEED FOR VACCINATION: ICD-10-CM

## 2023-01-13 DIAGNOSIS — E11.40 TYPE 2 DIABETES MELLITUS WITH DIABETIC NEUROPATHY, WITHOUT LONG-TERM CURRENT USE OF INSULIN: Primary | ICD-10-CM

## 2023-01-13 DIAGNOSIS — G62.9 PERIPHERAL POLYNEUROPATHY: ICD-10-CM

## 2023-01-13 LAB
EXPIRATION DATE: ABNORMAL
HBA1C MFR BLD: 6.4 %
Lab: ABNORMAL

## 2023-01-13 PROCEDURE — 3044F HG A1C LEVEL LT 7.0%: CPT | Performed by: NURSE PRACTITIONER

## 2023-01-13 PROCEDURE — 91312 COVID-19 (PFIZER) BIVALENT BOOSTER 12+YRS: CPT | Performed by: NURSE PRACTITIONER

## 2023-01-13 PROCEDURE — 99213 OFFICE O/P EST LOW 20 MIN: CPT | Performed by: NURSE PRACTITIONER

## 2023-01-13 PROCEDURE — 0124A COVID-19 (PFIZER) BIVALENT BOOSTER 12+YRS: CPT | Performed by: NURSE PRACTITIONER

## 2023-01-13 PROCEDURE — 83036 HEMOGLOBIN GLYCOSYLATED A1C: CPT | Performed by: NURSE PRACTITIONER

## 2023-01-13 RX ORDER — DULOXETIN HYDROCHLORIDE 30 MG/1
30 CAPSULE, DELAYED RELEASE ORAL DAILY
Qty: 7 CAPSULE | Refills: 0 | Status: SHIPPED | OUTPATIENT
Start: 2023-01-13 | End: 2023-01-20

## 2023-01-13 RX ORDER — VIT C/B6/B5/MAGNESIUM/HERB 173 50-5-6-5MG
1 CAPSULE ORAL DAILY
Qty: 30 CAPSULE | Refills: 0 | Status: SHIPPED | OUTPATIENT
Start: 2023-01-13

## 2023-01-13 RX ORDER — DULOXETIN HYDROCHLORIDE 60 MG/1
60 CAPSULE, DELAYED RELEASE ORAL DAILY
Qty: 30 CAPSULE | Refills: 1 | Status: SHIPPED | OUTPATIENT
Start: 2023-01-13

## 2023-01-13 NOTE — PROGRESS NOTES
"     Follow Up Office Visit      Date: 2023   Patient Name: Carmelo Saucedo  : 1960   MRN: [id number]     Chief Complaint:    Chief Complaint   Patient presents with   • Foot Pain     Pt states pain in feet and ankles, stays cold all the time.   • Chills   • Knee Pain       History of Present Illness: Carmelo Saucedo is a 62 y.o. male who is here today to follow up with chills, foot, and knee pain.    Chills  He states he is cold and having difficulty getting warm.    Bilateral feet pain  States his feet are sore.   Has neuropathy in his feet- describes as burning/fire sensation, worse at night     Bilateral knee pain  Notes he can barley bend his knees due to pain.  Follows with a pain management clinic.         Health maintenance   Had an eye exam last year, .  Endorses more than usual worry/ anxiety lately- \"I just don't feel right\"    Medication  Confirms the use of a low dose aspirin.     Subjective      Review of Systems:   Review of Systems   Constitutional: Positive for chills. Negative for fatigue and fever.   Endocrine: Negative for polydipsia, polyphagia and polyuria.   Musculoskeletal: Positive for arthralgias.   Neurological: Positive for numbness. Negative for weakness.       I have reviewed the patients family history, social history, past medical history, past surgical history and have updated it as appropriate.     Medications:     Current Outpatient Medications:   •  acyclovir (ZOVIRAX) 400 MG tablet, TAKE 1 TABLET BY MOUTH TWICE DAILY NO MORE THAN 5 DOSES A DAY., Disp: 180 tablet, Rfl: 0  •  aspirin 81 MG chewable tablet, Chew 162 mg Daily., Disp: , Rfl:   •  atorvastatin (LIPITOR) 20 MG tablet, Take 1 tablet by mouth every night at bedtime., Disp: 90 tablet, Rfl: 1  •  gabapentin (NEURONTIN) 600 MG tablet, Take 600 mg by mouth Every 6 (Six) Hours., Disp: , Rfl:   •  glucose blood test strip, Use as instructed to check blood sugar three times daily, Disp: 200 each, Rfl: 6  •  " "glucose monitor monitoring kit, 1 each Daily. Patient requests One Touch Glucose Meter.  Please substitute covered meter per insurance if needed., Disp: 1 each, Rfl: 0  •  hydroCHLOROthiazide (MICROZIDE) 12.5 MG capsule, Take 1 capsule by mouth Daily., Disp: 90 capsule, Rfl: 1  •  HYDROcodone-acetaminophen (NORCO) 7.5-325 MG per tablet, Take 1 tablet by mouth 3 (Three) Times a Day., Disp: , Rfl: 0  •  Insulin Glargine (LANTUS SOLOSTAR) 100 UNIT/ML injection pen, Inject 10 Units under the skin into the appropriate area as directed Every Night., Disp: 9 mL, Rfl: 1  •  Insulin Pen Needle (Pen Needles 3/16\") 31G X 5 MM misc, 1 each Every Night., Disp: 90 each, Rfl: 3  •  ipratropium-albuterol (Combivent Respimat)  MCG/ACT inhaler, Inhale 1 puff 4 (Four) Times a Day., Disp: 12 g, Rfl: 1  •  ipratropium-albuterol (DUONEB) 0.5-2.5 mg/3 ml nebulizer, Take 3 mL by nebulization Every 6 (Six) Hours., Disp: 120 vial, Rfl: 3  •  levothyroxine (SYNTHROID, LEVOTHROID) 88 MCG tablet, Take 1 tablet by mouth Every Morning Before Breakfast., Disp: 90 tablet, Rfl: 1  •  losartan (COZAAR) 25 MG tablet, Take 1 tablet by mouth Daily. Replaces lisinopril, Disp: 90 tablet, Rfl: 1  •  metFORMIN (GLUCOPHAGE) 1000 MG tablet, Take 1 tablet by mouth 2 (Two) Times a Day With Meals., Disp: 180 tablet, Rfl: 1  •  metoprolol succinate XL (TOPROL-XL) 50 MG 24 hr tablet, Take 1 tablet by mouth Daily., Disp: 90 tablet, Rfl: 1  •  pantoprazole (PROTONIX) 40 MG EC tablet, Take 1 tablet by mouth Daily., Disp: 90 tablet, Rfl: 1  •  sildenafil (REVATIO) 20 MG tablet, TAKE TWO TO THREE TABLETS BY MOUTH ONE HOUR PRIOR TO INTERCOURSE, Disp: 10 tablet, Rfl: 0  •  tamsulosin (FLOMAX) 0.4 MG capsule 24 hr capsule, Take 2 capsules by mouth every night at bedtime., Disp: 180 capsule, Rfl: 1  •  traZODone (DESYREL) 100 MG tablet, Take 2 tablets by mouth every night at bedtime., Disp: 180 tablet, Rfl: 1  •  DULoxetine (CYMBALTA) 30 MG capsule, Take 1 capsule by " "mouth Daily for 7 days., Disp: 7 capsule, Rfl: 0  •  DULoxetine (CYMBALTA) 60 MG capsule, Take 1 capsule by mouth Daily., Disp: 30 capsule, Rfl: 1  •  Turmeric 500 MG capsule, Take 1 capsule by mouth Daily., Disp: 30 capsule, Rfl: 0    Allergies:   Allergies   Allergen Reactions   • Lisinopril Cough       Objective     Physical Exam: Please see above  Vital Signs:   Vitals:    01/13/23 0840   BP: 106/68   Pulse: 69   Resp: 16   Temp: 97.8 °F (36.6 °C)   SpO2: 98%   Weight: 94.3 kg (208 lb)   Height: 185.4 cm (73\")   PainSc:   6     Body mass index is 27.44 kg/m².    Physical Exam  Vitals and nursing note reviewed.   Constitutional:       General: He is not in acute distress.     Appearance: Normal appearance. He is well-developed.   HENT:      Head: Normocephalic and atraumatic.      Mouth/Throat:      Mouth: Mucous membranes are moist.      Pharynx: Oropharynx is clear.   Eyes:      Pupils: Pupils are equal, round, and reactive to light.   Neck:      Trachea: No tracheal deviation.   Cardiovascular:      Rate and Rhythm: Normal rate and regular rhythm.      Heart sounds: Normal heart sounds.   Pulmonary:      Effort: Pulmonary effort is normal. No respiratory distress.      Breath sounds: Normal breath sounds.   Skin:     General: Skin is warm and dry.      Capillary Refill: Capillary refill takes less than 2 seconds.   Neurological:      General: No focal deficit present.      Mental Status: He is alert and oriented to person, place, and time. Mental status is at baseline.      Motor: No weakness.      Coordination: Coordination normal.      Gait: Gait normal.   Psychiatric:         Mood and Affect: Mood normal.         Behavior: Behavior normal.         Thought Content: Thought content normal.         Judgment: Judgment normal.       Diabetic foot exam:   Left: Filament test present   Pulses Dorsalis Pedis:  present  Posterior Tibial:  present      Vibratory sensation normal            Right: Filament test " present   Pulses Dorsalis Pedis:  present  Posterior Tibial:  present      Vibratory sensation normal       Results:   Imaging:     Labs:    Latest Reference Range & Units 09/08/22 12:02   Vitamin B-12 211 - 946 pg/mL 556   WBC 3.40 - 10.80 10*3/mm3 10.55   RBC 4.14 - 5.80 10*6/mm3 5.11   Hemoglobin 13.0 - 17.7 g/dL 15.9   Hematocrit 37.5 - 51.0 % 49.0   RDW 12.3 - 15.4 % 13.0   MCV 79.0 - 97.0 fL 95.9   MCH 26.6 - 33.0 pg 31.1   MCHC 31.5 - 35.7 g/dL 32.4   MPV 6.0 - 12.0 fL 13.0 (H)   Platelets 140 - 450 10*3/mm3 232   RDW-SD 37.0 - 54.0 fl 46.5   (H): Data is abnormally high    Assessment / Plan      Assessment/Plan:   Diagnoses and all orders for this visit:    1. Type 2 diabetes mellitus with diabetic neuropathy, without long-term current use of insulin (HCC) (Primary)  -     POC Glycosylated Hemoglobin (Hb A1C) 6.4%, stable   -cont current tx plan     2.Neuropathy  -will begin duloxetine: 30mg x7 days, then increase to 60mg daily.   -this will also help improve your mood symptoms     3.Pain  -He was advised to follow up with the pain management clinic and let them know his pain is uncontrolled.   -tumeric trial for additional pain relief     Follow Up:   Return in about 4 weeks (around 2/10/2023) for Recheck.    RADHA Campuzano  [facility] Internal Medicine [place]   Transcribed from ambient dictation for RADHA Pereira by Irlanda Randle.  01/13/23   11:53 EST    Patient or patient representative verbalized consent to the visit recording.  I have personally performed the services described in this document as transcribed by the above individual, and it is both accurate and complete.

## 2023-01-20 DIAGNOSIS — J44.1 CHRONIC OBSTRUCTIVE PULMONARY DISEASE WITH ACUTE EXACERBATION: ICD-10-CM

## 2023-01-20 RX ORDER — IPRATROPIUM/ALBUTEROL SULFATE 20-100 MCG
1 MIST INHALER (GRAM) INHALATION 4 TIMES DAILY
Qty: 12 G | Refills: 1 | Status: SHIPPED | OUTPATIENT
Start: 2023-01-20

## 2023-01-20 NOTE — TELEPHONE ENCOUNTER
Caller: Carmelo Saucedo    Relationship: Self    Best call back number: [phone]    Requested Prescriptions:   Requested Prescriptions     Pending Prescriptions Disp Refills   • ipratropium-albuterol (Combivent Respimat)  MCG/ACT inhaler 12 g 1     Sig: Inhale 1 puff 4 (Four) Times a Day.        Pharmacy where request should be sent: [facility] [address] [place], [place] [phone] [place] [phone] FX     Additional details provided by patient: PATIENT WANTS A 90 DAY SUPPLY.    Does the patient have less than a 3 day supply:  [] Yes  [x] No    Would you like a call back once the refill request has been completed: [] Yes [x] No    If the office needs to give you a call back, can they leave a voicemail: [] Yes [x] No    Manjeet Llanes Rep   01/20/23 15:40 EST

## 2023-02-20 DIAGNOSIS — K21.9 GASTROESOPHAGEAL REFLUX DISEASE, UNSPECIFIED WHETHER ESOPHAGITIS PRESENT: ICD-10-CM

## 2023-02-20 DIAGNOSIS — I10 HYPERTENSION, UNSPECIFIED TYPE: ICD-10-CM

## 2023-02-20 DIAGNOSIS — I10 ESSENTIAL HYPERTENSION: ICD-10-CM

## 2023-02-20 DIAGNOSIS — E03.9 ACQUIRED HYPOTHYROIDISM: ICD-10-CM

## 2023-02-20 DIAGNOSIS — E78.2 MIXED HYPERLIPIDEMIA: ICD-10-CM

## 2023-02-20 RX ORDER — HYDROCHLOROTHIAZIDE 12.5 MG/1
12.5 CAPSULE, GELATIN COATED ORAL DAILY
Qty: 90 CAPSULE | Refills: 1 | Status: SHIPPED | OUTPATIENT
Start: 2023-02-20

## 2023-02-20 RX ORDER — LOSARTAN POTASSIUM 25 MG/1
25 TABLET ORAL DAILY
Qty: 90 TABLET | Refills: 1 | Status: SHIPPED | OUTPATIENT
Start: 2023-02-20

## 2023-02-20 RX ORDER — ATORVASTATIN CALCIUM 20 MG/1
20 TABLET, FILM COATED ORAL
Qty: 90 TABLET | Refills: 1 | Status: SHIPPED | OUTPATIENT
Start: 2023-02-20

## 2023-02-20 RX ORDER — METOPROLOL SUCCINATE 50 MG/1
50 TABLET, EXTENDED RELEASE ORAL DAILY
Qty: 90 TABLET | Refills: 1 | Status: SHIPPED | OUTPATIENT
Start: 2023-02-20

## 2023-02-20 RX ORDER — LEVOTHYROXINE SODIUM 88 UG/1
88 TABLET ORAL
Qty: 90 TABLET | Refills: 1 | Status: SHIPPED | OUTPATIENT
Start: 2023-02-20

## 2023-02-20 RX ORDER — PANTOPRAZOLE SODIUM 40 MG/1
40 TABLET, DELAYED RELEASE ORAL DAILY
Qty: 90 TABLET | Refills: 1 | Status: SHIPPED | OUTPATIENT
Start: 2023-02-20

## 2023-02-20 NOTE — TELEPHONE ENCOUNTER
Caller: Carmelo Saucedo John Paul    Relationship: Self    Best call back number: [phone]    Requested Prescriptions:   Requested Prescriptions     Pending Prescriptions Disp Refills   • levothyroxine (SYNTHROID, LEVOTHROID) 88 MCG tablet 90 tablet 1     Sig: Take 1 tablet by mouth Every Morning Before Breakfast.   • hydroCHLOROthiazide (MICROZIDE) 12.5 MG capsule 90 capsule 1     Sig: Take 1 capsule by mouth Daily.   • pantoprazole (PROTONIX) 40 MG EC tablet 90 tablet 1     Sig: Take 1 tablet by mouth Daily.   • metoprolol succinate XL (TOPROL-XL) 50 MG 24 hr tablet 90 tablet 1     Sig: Take 1 tablet by mouth Daily.   • losartan (COZAAR) 25 MG tablet 90 tablet 1     Sig: Take 1 tablet by mouth Daily. Replaces lisinopril   • atorvastatin (LIPITOR) 20 MG tablet 90 tablet 1     Sig: Take 1 tablet by mouth every night at bedtime.        Pharmacy where request should be sent: [facility] [address] [place] [phone] [address] [place], [place] [phone] FX     Additional details provided by patient: PATIENT WOULD LIKE 90 DAY SUPPLY FOR EACH MEDICATION    Does the patient have less than a 3 day supply:  [] Yes  [x] No    Would you like a call back once the refill request has been completed: [] Yes [x] No    If the office needs to give you a call back, can they leave a voicemail: [] Yes [x] No    Manjeet Wooten Rep   02/20/23 10:35 EST

## 2023-02-27 DIAGNOSIS — B00.9 HSV-2 INFECTION: ICD-10-CM

## 2023-02-27 RX ORDER — ACYCLOVIR 400 MG/1
TABLET ORAL
Qty: 180 TABLET | Refills: 0 | OUTPATIENT
Start: 2023-02-27

## 2023-03-01 ENCOUNTER — TELEPHONE (OUTPATIENT)
Dept: INTERNAL MEDICINE | Facility: CLINIC | Age: 63
End: 2023-03-01
Payer: MEDICARE

## 2023-03-01 DIAGNOSIS — B00.9 HSV-2 INFECTION: ICD-10-CM

## 2023-03-01 RX ORDER — ACYCLOVIR 400 MG/1
400 TABLET ORAL 2 TIMES DAILY
Qty: 180 TABLET | Refills: 0 | Status: SHIPPED | OUTPATIENT
Start: 2023-03-01

## 2023-03-22 DIAGNOSIS — E11.69 TYPE 2 DIABETES MELLITUS WITH OTHER SPECIFIED COMPLICATION, WITHOUT LONG-TERM CURRENT USE OF INSULIN: ICD-10-CM

## 2023-03-22 NOTE — TELEPHONE ENCOUNTER
Caller: Carmelo Saucedo    Relationship: Self    Best call back number: [phone]    Requested Prescriptions:   Requested Prescriptions     Pending Prescriptions Disp Refills   • Insulin Glargine (LANTUS SOLOSTAR) 100 UNIT/ML injection pen 9 mL 1     Sig: Inject 10 Units under the skin into the appropriate area as directed Every Night.        Pharmacy where request should be sent: [facility] [address] [place] [address] [phone] PH - [phone] FX     Last office visit with prescribing clinician: 1/13/2023   Last telemedicine visit with prescribing clinician: Visit date not found   Next office visit with prescribing clinician: Visit date not found     Additional details provided by patient: PATIENT WOULD LIKE TO MAKE SURE IT IS SENT IN AS A 3 MONTH SUPPLY     Does the patient have less than a 3 day supply:  [] Yes  [x] No    Would you like a call back once the refill request has been completed: [] Yes [x] No    If the office needs to give you a call back, can they leave a voicemail: [] Yes [x] No    Cadance Dunaway, RegSched Rep   03/22/23 10:29 EDT

## 2023-04-19 ENCOUNTER — LAB (OUTPATIENT)
Dept: LAB | Facility: HOSPITAL | Age: 63
End: 2023-04-19
Payer: MEDICARE

## 2023-04-19 ENCOUNTER — OFFICE VISIT (OUTPATIENT)
Dept: INTERNAL MEDICINE | Facility: CLINIC | Age: 63
End: 2023-04-19
Payer: MEDICARE

## 2023-04-19 VITALS
DIASTOLIC BLOOD PRESSURE: 64 MMHG | BODY MASS INDEX: 27.83 KG/M2 | WEIGHT: 210 LBS | OXYGEN SATURATION: 96 % | RESPIRATION RATE: 18 BRPM | HEART RATE: 71 BPM | SYSTOLIC BLOOD PRESSURE: 104 MMHG | HEIGHT: 73 IN

## 2023-04-19 DIAGNOSIS — E11.69 TYPE 2 DIABETES MELLITUS WITH OTHER SPECIFIED COMPLICATION, WITHOUT LONG-TERM CURRENT USE OF INSULIN: ICD-10-CM

## 2023-04-19 DIAGNOSIS — R23.3 EASY BRUISING: ICD-10-CM

## 2023-04-19 DIAGNOSIS — R06.01 ORTHOPNEA: ICD-10-CM

## 2023-04-19 DIAGNOSIS — E11.69 TYPE 2 DIABETES MELLITUS WITH OTHER SPECIFIED COMPLICATION, WITHOUT LONG-TERM CURRENT USE OF INSULIN: Primary | ICD-10-CM

## 2023-04-19 DIAGNOSIS — R06.00 DYSPNEA, UNSPECIFIED TYPE: ICD-10-CM

## 2023-04-19 DIAGNOSIS — Z71.6 ENCOUNTER FOR SMOKING CESSATION COUNSELING: ICD-10-CM

## 2023-04-19 DIAGNOSIS — E78.2 MIXED HYPERLIPIDEMIA: ICD-10-CM

## 2023-04-19 DIAGNOSIS — G62.9 PERIPHERAL POLYNEUROPATHY: ICD-10-CM

## 2023-04-19 DIAGNOSIS — J44.1 CHRONIC OBSTRUCTIVE PULMONARY DISEASE WITH ACUTE EXACERBATION: ICD-10-CM

## 2023-04-19 DIAGNOSIS — Z00.00 HEALTHCARE MAINTENANCE: ICD-10-CM

## 2023-04-19 DIAGNOSIS — F17.218 NICOTINE DEPENDENCE, CIGARETTES, WITH OTHER NICOTINE-INDUCED DISORDERS: ICD-10-CM

## 2023-04-19 DIAGNOSIS — R07.9 CHEST PAIN, UNSPECIFIED TYPE: ICD-10-CM

## 2023-04-19 LAB
EXPIRATION DATE: NORMAL
HBA1C MFR BLD: 6.7 %
INR PPP: 1.03 (ref 0.84–1.13)
Lab: NORMAL
PROTHROMBIN TIME: 13.5 SECONDS (ref 11.4–14.4)

## 2023-04-19 PROCEDURE — 80048 BASIC METABOLIC PNL TOTAL CA: CPT

## 2023-04-19 PROCEDURE — 83880 ASSAY OF NATRIURETIC PEPTIDE: CPT

## 2023-04-19 PROCEDURE — 85027 COMPLETE CBC AUTOMATED: CPT

## 2023-04-19 PROCEDURE — 36415 COLL VENOUS BLD VENIPUNCTURE: CPT

## 2023-04-19 PROCEDURE — 85610 PROTHROMBIN TIME: CPT

## 2023-04-19 PROCEDURE — 80061 LIPID PANEL: CPT

## 2023-04-19 RX ORDER — ALBUTEROL SULFATE 90 UG/1
2 AEROSOL, METERED RESPIRATORY (INHALATION) EVERY 4 HOURS PRN
Qty: 18 G | Refills: 3 | Status: SHIPPED | OUTPATIENT
Start: 2023-04-19

## 2023-04-19 NOTE — PROGRESS NOTES
Follow Up Office Visit      Date: 2023   Patient Name: Carmelo Saucedo  : 1960   MRN: [id number]     Chief Complaint:    Chief Complaint   Patient presents with   • Diabetes     Follow up. Pt c/o having issues w/ breathing and knee pain, hx of knee surgeries. Would like rx for diabetic shoes   • Hypertension       History of Present Illness: Carmelo Saucedo is a 62 y.o. male who is here today to follow up with diabetes and bilateral LE peripheral neuropathy.     DM2  -A1c is 6.7 % today, increased from 6.4 % at his last visit.   -Patient requesting an order for diabetic shoes.   -Denies following a healthy diet, and he has gained a few pounds since his last visit. Reports difficulty adhering to diabetic friendly diet due to financial constraints and cost of healthier food options. Due to chronic pain and recent worsening bilateral knee pain, regular exercise is also difficult to remain compliant with  -lantus 10 units qhs, metformin 1000mg BID    Neuropathy  -Patient endorses a burning sensation in his bilateral feet, unchanged since his last visit.  -remains on gabapentin, Rx managed by Dr Aviles his pain specialist  -He never started duloxetine since last visit bc pharmacy never contacted him to let him know this Rx was ready for p/u  -describes that when squeezing his bilateral toes, his feet feel sunburned.   -Endorses numbness in his bilateral arms, left arm worse than the right.   -At night when laying down, he cannot feel his left arm.     Chronic Pain  -Since his last visit on  he denies contacting his pain specialist regarding uncontrolled sxs as prev instructed by me  -He also never started tumeric trial I recommended on  for generalized arthralgias     ROCHELLE, heaviness in chest   -Inquires about recommendations for the following sxs that are intermittent and recently worsened: dyspnea, orthopnea,   -Patient reports having trouble breathing for quite some time.   -Symptoms worsen at  "night when laying flat, and he does not sleep well.   -pmh notable for COPD  -Currently using Combivent Respimat inhaler, 1 puff but not on regular daily basis   -Patient uses DuoNeb as needed but often chooses not to use it even when he is symptomatic bc it is cumbersome to set his equipment up   -Patient reports his chest feels \"full\".   -Occasionally coughs up phlegm in the mornings.     Tobacco dependency  -Patient is still smoking, and he has a frequent cough.   -Reports trying to quit a couple of times with no success.   -He tried the patch, but it did not work. Only tried one single patch one time.   -His father and uncles passed away from lung cancer.   -Verbalized desire to stop smoking and interested in options for smoking cessation therapies. However, concerned about feasibility of obtaining/ sustainability of tx given high cost     Bilateral knee pain  -Endorses pain in his bilateral knees that is occasionally unbearable and thinks he needs to see a surgeon.   -History of 7 surgeries on his left knee and 6 surgeries on his right knee.   -Notes that his bilateral knees are \"sideways\".   -Patient prev followed with [facility] and by Dr. Dg Chavarria at [facility].   -Previously had injections that lasted 2 to 3 days.   -Follows with pain management for back, neck, and bilateral knee pain. Has not discussed pain concerns with him per pt.   -Does not sleep well due to his pain.   -Currently taking pain medication.         Chronic kidney failure  Patient experienced kidney failure in 2019.     Bruising  Reports bruising easily.   Patient can hit his hand on something, and it will turn black.   Currently taking aspirin every other day.     Subjective      Review of Systems:   Review of Systems   Respiratory: Positive for cough and shortness of breath.    Cardiovascular: Positive for chest pain.   Musculoskeletal: Positive for arthralgias and myalgias.   Neurological: Positive for numbness. " "       Bilateral arm numbness.        I have reviewed the patients family history, social history, past medical history, past surgical history and have updated it as appropriate.     Medications:     Current Outpatient Medications:   •  acyclovir (ZOVIRAX) 400 MG tablet, Take 1 tablet by mouth 2 (Two) Times a Day. Take no more than 5 doses a day., Disp: 180 tablet, Rfl: 0  •  aspirin 81 MG chewable tablet, Chew 2 tablets Daily., Disp: , Rfl:   •  atorvastatin (LIPITOR) 20 MG tablet, Take 1 tablet by mouth every night at bedtime., Disp: 90 tablet, Rfl: 1  •  DULoxetine (CYMBALTA) 60 MG capsule, Take 1 capsule by mouth Daily., Disp: 30 capsule, Rfl: 1  •  gabapentin (NEURONTIN) 600 MG tablet, Take 1 tablet by mouth Every 6 (Six) Hours., Disp: , Rfl:   •  glucose blood test strip, Use as instructed to check blood sugar three times daily, Disp: 200 each, Rfl: 6  •  glucose monitor monitoring kit, 1 each Daily. Patient requests One Touch Glucose Meter.  Please substitute covered meter per insurance if needed., Disp: 1 each, Rfl: 0  •  hydroCHLOROthiazide (MICROZIDE) 12.5 MG capsule, Take 1 capsule by mouth Daily., Disp: 90 capsule, Rfl: 1  •  HYDROcodone-acetaminophen (NORCO) 7.5-325 MG per tablet, Take 1 tablet by mouth 3 (Three) Times a Day., Disp: , Rfl: 0  •  Insulin Glargine (LANTUS SOLOSTAR) 100 UNIT/ML injection pen, Inject 10 Units under the skin into the appropriate area as directed Every Night., Disp: 9 mL, Rfl: 1  •  Insulin Pen Needle (Pen Needles 3/16\") 31G X 5 MM misc, 1 each Every Night., Disp: 90 each, Rfl: 3  •  ipratropium-albuterol (Combivent Respimat)  MCG/ACT inhaler, Inhale 1 puff 4 (Four) Times a Day., Disp: 12 g, Rfl: 1  •  ipratropium-albuterol (DUONEB) 0.5-2.5 mg/3 ml nebulizer, Take 3 mL by nebulization Every 6 (Six) Hours., Disp: 120 vial, Rfl: 3  •  levothyroxine (SYNTHROID, LEVOTHROID) 88 MCG tablet, Take 1 tablet by mouth Every Morning Before Breakfast., Disp: 90 tablet, Rfl: 1  •  " "losartan (COZAAR) 25 MG tablet, Take 1 tablet by mouth Daily. Replaces lisinopril, Disp: 90 tablet, Rfl: 1  •  metFORMIN (GLUCOPHAGE) 1000 MG tablet, Take 1 tablet by mouth 2 (Two) Times a Day With Meals., Disp: 180 tablet, Rfl: 1  •  metoprolol succinate XL (TOPROL-XL) 50 MG 24 hr tablet, Take 1 tablet by mouth Daily., Disp: 90 tablet, Rfl: 1  •  pantoprazole (PROTONIX) 40 MG EC tablet, Take 1 tablet by mouth Daily., Disp: 90 tablet, Rfl: 1  •  sildenafil (REVATIO) 20 MG tablet, TAKE TWO TO THREE TABLETS BY MOUTH ONE HOUR PRIOR TO INTERCOURSE, Disp: 10 tablet, Rfl: 0  •  tamsulosin (FLOMAX) 0.4 MG capsule 24 hr capsule, Take 2 capsules by mouth every night at bedtime., Disp: 180 capsule, Rfl: 1  •  traZODone (DESYREL) 100 MG tablet, Take 2 tablets by mouth every night at bedtime., Disp: 180 tablet, Rfl: 1  •  Turmeric 500 MG capsule, Take 1 capsule by mouth Daily., Disp: 30 capsule, Rfl: 0  •  albuterol sulfate  (90 Base) MCG/ACT inhaler, Inhale 2 puffs Every 4 (Four) Hours As Needed for Wheezing or Shortness of Air., Disp: 18 g, Rfl: 3    Allergies:   Allergies   Allergen Reactions   • Lisinopril Cough       Objective     Physical Exam: Please see above  Vital Signs:   Vitals:    04/19/23 1337   BP: 104/64   Pulse: 71   Resp: 18   SpO2: 96%   Weight: 95.3 kg (210 lb)   Height: 185.4 cm (73\")   PainSc:   7     Body mass index is 27.71 kg/m².    Physical Exam      ECG 12 Lead    Date/Time: 4/19/2023 3:12 PM  Performed by: Karlene Humphries APRN  Authorized by: Karlene Humphries APRN   Comparison: not compared with previous ECG   Previous ECG: no previous ECG available  Rhythm: sinus rhythm  Rate: normal  BPM: 61  Conduction: conduction normal  QRS axis: normal  Other: no other findings    Clinical impression: normal ECG            Results:   Imaging:     Labs:       Assessment / Plan      Assessment/Plan:   Diagnoses and all orders for this visit:    1. Type 2 diabetes mellitus with other specified " complication, without long-term current use of insulin (Primary)  -     POC Glycosylated Hemoglobin (Hb A1C) 6.7, worse from previous   -     Cancel: POC Microalbumin. Not collected before pt left office. Currently overdue. Will check urine m/c ratio on return. Lab order placed.   -     Footwear Diabetic  -     Microalbumin / Creatinine Urine Ratio - Urine, Clean Catch; Future  -continue metformin and insulin as previously prescribed.   -Will consult CM regarding financial difficulties with adherence to diet plan  -need better control of glucose/ dm to prevent worsening of ds related problems such as peripheral neuropathy    2. Chronic obstructive pulmonary disease with acute exacerbation  -advised maintenance tx must be taken daily  -stop combivent respimat inhaler  -begin trellegy inhaler one puff daily. Demonstrated how to use this inhaler. Sample provided.   -continue albuterol prn, sent inhaler for increased compliance over neb  -LDCT in 8/2022 with no acute infectious or inflammatory process. No suspicious pulmonary nodules or adenopathy. Redemonstration of mild emphysema and lower lung predominate bronchiectasis.   -     albuterol sulfate  (90 Base) MCG/ACT inhaler; Inhale 2 puffs Every 4 (Four) Hours As Needed for Wheezing or Shortness of Air.  Dispense: 18 g; Refill: 3    3. Orthopnea  4. Dyspnea, unspecified type  -copd tx changes, as above   -     proBNP; Future  -     Basic metabolic panel; Future  -ekg NSR  -Consider CXR, but will start with trial of new inhaler and labs  -no previous PFT    5. Chest pain, unspecified type  -     ECG 12 Lead NSR  -     Basic metabolic panel; Future    6. Healthcare maintenance  -     CBC (No Diff); Future  -     Basic metabolic panel; Future    7. Easy bruising  -     Protime-INR; Future    8. Mixed hyperlipidemia  -pt is not currently fasting but we will proceed with updating lipid panel since it is currenlty  overdue x3 yrs  -continue lipitor 20mg qhs  -      Lipid Panel; Future     9. Neuropathy  -Diabetic plan as above  -F/u with pain specialist and discuss ongoing uncontrolled sxs  -continue gabapentin as prescribed  - duloxetine Rx and begin as prescribed   -previously discussed nerve conduction testing with pt     10. nicotine dependence, cigarettes  11. smoking cessation counseling   I advised the patient of the risks of continuing to use tobacco, and I offered smoking cessation materials as well as reviewed strategies and medications that could assist in quitting smoking.   Patient expresses willingness to work on quitting.    Willing to Quit Tobacco Products  I advised patient to quit, and offered support.  Discussed current use pattern.  Commended for quitting. Reviewed interventions to prevent relapses.  Wellbutrin: denies h/o seizures or eating disorders.  Nicotine patches beginning at 12 if 6-10 cigs/day habit, 21mg for >10 cigs/day mg.  Nicotine gum.  Acupuncture.  Follow up with PCP in 3 months or as needed.  I will be in touch with CM to see if health dept offers resources or if she is aware of any others available to him to aid in successful smoking cessation   I spent approximately 10 minutes counseling the patient.  Barriers: fear of failing again and spouse/partner smokes  Time spent counseling: > 3-10 minutes    Follow Up:   Return in about 3 months (around 7/19/2023) for Recheck.    RADHA Campuzano  [facility] Internal [facility] [place]     Transcribed from ambient dictation for RADHA Pereira by Primitivo Bowen.  04/19/23   15:30 EDT    Patient or patient representative verbalized consent to the visit recording.  I have personally performed the services described in this document as transcribed by the above individual, and it is both accurate and complete.

## 2023-04-20 LAB
ANION GAP SERPL CALCULATED.3IONS-SCNC: 11 MMOL/L (ref 5–15)
BUN SERPL-MCNC: 18 MG/DL (ref 8–23)
BUN/CREAT SERPL: 12.9 (ref 7–25)
CALCIUM SPEC-SCNC: 9.5 MG/DL (ref 8.6–10.5)
CHLORIDE SERPL-SCNC: 103 MMOL/L (ref 98–107)
CHOLEST SERPL-MCNC: 121 MG/DL (ref 0–200)
CO2 SERPL-SCNC: 26 MMOL/L (ref 22–29)
CREAT SERPL-MCNC: 1.39 MG/DL (ref 0.76–1.27)
DEPRECATED RDW RBC AUTO: 43.3 FL (ref 37–54)
EGFRCR SERPLBLD CKD-EPI 2021: 56.6 ML/MIN/1.73
ERYTHROCYTE [DISTWIDTH] IN BLOOD BY AUTOMATED COUNT: 12.7 % (ref 12.3–15.4)
GLUCOSE SERPL-MCNC: 106 MG/DL (ref 65–99)
HCT VFR BLD AUTO: 44.8 % (ref 37.5–51)
HDLC SERPL-MCNC: 21 MG/DL (ref 40–60)
HGB BLD-MCNC: 15.6 G/DL (ref 13–17.7)
LDLC SERPL CALC-MCNC: 60 MG/DL (ref 0–100)
LDLC/HDLC SERPL: 2.38 {RATIO}
MCH RBC QN AUTO: 32.5 PG (ref 26.6–33)
MCHC RBC AUTO-ENTMCNC: 34.8 G/DL (ref 31.5–35.7)
MCV RBC AUTO: 93.3 FL (ref 79–97)
NT-PROBNP SERPL-MCNC: 28.3 PG/ML (ref 0–900)
PLATELET # BLD AUTO: 279 10*3/MM3 (ref 140–450)
PMV BLD AUTO: 12.1 FL (ref 6–12)
POTASSIUM SERPL-SCNC: 4.1 MMOL/L (ref 3.5–5.2)
RBC # BLD AUTO: 4.8 10*6/MM3 (ref 4.14–5.8)
SODIUM SERPL-SCNC: 140 MMOL/L (ref 136–145)
TRIGL SERPL-MCNC: 250 MG/DL (ref 0–150)
VLDLC SERPL-MCNC: 40 MG/DL (ref 5–40)
WBC NRBC COR # BLD: 11.62 10*3/MM3 (ref 3.4–10.8)

## 2023-04-21 ENCOUNTER — REFERRAL TRIAGE (OUTPATIENT)
Dept: CASE MANAGEMENT | Facility: OTHER | Age: 63
End: 2023-04-21
Payer: MEDICARE

## 2023-04-24 ENCOUNTER — PATIENT OUTREACH (OUTPATIENT)
Dept: CASE MANAGEMENT | Facility: OTHER | Age: 63
End: 2023-04-24
Payer: MEDICARE

## 2023-04-24 DIAGNOSIS — E11.69 TYPE 2 DIABETES MELLITUS WITH OTHER SPECIFIED COMPLICATION, WITHOUT LONG-TERM CURRENT USE OF INSULIN: ICD-10-CM

## 2023-04-24 DIAGNOSIS — Z72.0 TOBACCO USE: Primary | ICD-10-CM

## 2023-04-24 PROCEDURE — 99490 CHRNC CARE MGMT STAFF 1ST 20: CPT | Performed by: NURSE PRACTITIONER

## 2023-04-24 NOTE — OUTREACH NOTE
AMBULATORY CASE MANAGEMENT NOTE    Name and Relationship of Patient/Support Person: Lewis Carmelo John Paul - Self    CCM Interim Update    RN-ACM received referral from PCP for patient to assist with tobacco cessation and long term management of diabetes. RN-ACM outreach call made to patient. Explained role of RN-ACM and reason for call. CCM program explained to patient, verbal consent obtained.     Patient with history of diabetes. Patient last A1C has increased from 6.1 to 6.7. Patient states he is taking insulin as ordered. Patient states he does not check his blood sugar every day but when he does check it is usually around 120 in the mornings. PCP had placed an order for diabetic shoes. RN-ACM sent that to prescription pad in [place] per patient request along with printing and mailing order to patient.     Patient requesting assistance to stop smoking. Patient states he smokes 1.5 packs a day and has for 50 years. Patient states he has a large family history of lung cancer. RN-ACM advised patient to call 1-800-quit-now to ask about nicotine patches. RN-ACM also mailed patient information from Epic on steps to quit smoking.     Reviewed SDOH, patient denies any needs at this time. Patient has no questions at this time. RN-ACM advised patient to call RN-ACM or Religious Nurse Line with any needs.     Follow up outreach next week for Plan of Care.    Adult Patient Profile  Questions/Answers    Flowsheet Row Most Recent Value   Symptoms/Conditions Managed at Home diabetes, type 2, behavioral health   Diabetes Management Strategies insulin therapy, medication therapy, blood glucose testing   A1C Target <7   Usual Blood Glucose 100-200   Last A1C Result 6.7   Behavioral Health Symptoms/Conditions substance use   Substance Use tobacco   Barriers to Taking Medication as Prescribed none   Hearing Difficulty or Deaf no   Difficulty Communicating no   Equipment Currently Used at Home glucometer   Primary Source of  Support/Comfort spouse   People in Home spouse   Name(s) of People in Home Traci   Current Living Arrangements home   Resource/Environmental Concerns none        Send Education  Questions/Answers    Flowsheet Row Most Recent Value   Annual Wellness Visit:  Patient Has Completed   Other Patient Education/Resources  24/7 [facility] Nurse Call Line   24/7 Nurse Call Line Education Method Verbal        SDOH updated and reviewed with the patient during this program:  Financial Resource Strain: Low Risk    • Difficulty of Paying Living Expenses: Not very hard      Food Insecurity: No Food Insecurity   • Worried About Running Out of Food in the Last Year: Never true   • Ran Out of Food in the Last Year: Never true      Transportation Needs: No Transportation Needs   • Lack of Transportation (Medical): No   • Lack of Transportation (Non-Medical): No       Education Documentation  Blood Glucose Monitoring, taught by Nellie Flannery, RN at 4/24/2023  9:51 AM.  Learner: Patient  Readiness: Acceptance  Method: Explanation  Response: Verbalizes Understanding    Blood Glucose Goal, taught by Nellie Flannery, RN at 4/24/2023  9:51 AM.  Learner: Patient  Readiness: Acceptance  Method: Explanation  Response: Verbalizes Understanding        Nellie GRIMALDO  Ambulatory Case Management    4/24/2023, 09:23 EDT

## 2023-04-27 ENCOUNTER — PATIENT OUTREACH (OUTPATIENT)
Dept: CASE MANAGEMENT | Facility: OTHER | Age: 63
End: 2023-04-27
Payer: MEDICARE

## 2023-04-27 DIAGNOSIS — Z72.0 TOBACCO USE: Primary | ICD-10-CM

## 2023-04-27 DIAGNOSIS — E11.69 TYPE 2 DIABETES MELLITUS WITH OTHER SPECIFIED COMPLICATION, WITHOUT LONG-TERM CURRENT USE OF INSULIN: ICD-10-CM

## 2023-04-27 NOTE — OUTREACH NOTE
"[facility] End of Month Documentation    This Chronic Medical Management Care Plan for Carmelo Saucedo, 62 y.o. male, has been monitored and managed; reviewed; established and a new plan of care implemented for the month of April.  A cumulative time of 32  minutes was spent on this patient record this month, including phone call with patient; chart review.    Regarding the patient's problems: has Diabetes mellitus, type 2; Diabetic peripheral neuropathy; Gastroesophageal reflux disease with esophagitis; Hypertension; Hypothyroidism; Low back pain; Congestive cardiomyopathy; Chronic obstructive pulmonary disease; Anxiety disorder; Arthritis; CHF (congestive heart failure); Chronic pain; Chronic pancreatitis; Depression; Osteoarthritis; Schizoaffective disorder, bipolar type; Tobacco use; H/O tobacco use, presenting hazards to health; Migraine without status migrainosus, not intractable; Hyperlipidemia; Balanitis; Insomnia; Smoker; Floppy eyelid syndrome; Floaters; Epiretinal membrane (ERM) of right eye; Dry eyes, bilateral; Cyst, eyelid, left; Combined form of age-related cataract, both eyes; Bilateral hypertensive retinopathy; Stage 3a chronic kidney disease; and Benign prostatic hyperplasia with nocturia on their problem list., the following items were addressed: medical records; medications and any changes can be found within the plan section of the note.  A detailed listing of time spent for chronic care management is tracked within each outreach encounter.  Current medications include:  has a current medication list which includes the following prescription(s): acyclovir, albuterol sulfate hfa, aspirin, atorvastatin, duloxetine, gabapentin, glucose blood, glucose monitor, hydrochlorothiazide, hydrocodone-acetaminophen, insulin glargine, pen needles 3/16\", combivent respimat, ipratropium-albuterol, levothyroxine, losartan, metformin, metoprolol succinate xl, pantoprazole, sildenafil, tamsulosin, trazodone, turmeric, " "[DISCONTINUED] clonazepam, [DISCONTINUED] freestyle, [DISCONTINUED] nitroglycerin, [DISCONTINUED] topiramate, and [DISCONTINUED] venlafaxine xr. and the patient is reported to be patient is compliant with medication protocol,  Medications are reported to be effective.  All notes on chart for PCP to review.    The patient was monitored remotely for blood glucose; medications.    The patient's physical needs include:  medication education; DME supplies.     The patient's mental support needs include:  continued support    The patient's cognitive support needs include:  continued support    The patient's psychosocial support needs include:  continued support    The patient's functional needs include: DME; medication education    The patient's environmental needs include:  not applicable    Care Plan overall comments:  No data recorded    Refer to previous outreach notes for more information on the areas listed above.    Monthly Billing Diagnoses  (Z72.0) Tobacco use    (E11.69) Type 2 diabetes mellitus with other specified complication, without long-term current use of insulin    Medications   · Medications have been reconciled    Care Plan progress this month:      Recently Modified Care Plans Updates made since 3/27/2023 12:00 AM     Wellness (Adult)         Problem Priority Last Modified     Tobacco Use (Wellness) --  4/24/2023  9:33 AM by Nellie Flannery RN              Goal Recent Progress Last Modified     Tobacco Use Managed --  4/24/2023  9:33 AM by Nellie Flannery RN     Evidence-based guidance:   Identify tobacco use status including cigarette, cigar, roll-your-own, dissolvable, hookah or smokeless tobacco, nicotine gels, vapes, e-cigarettes or other electronic delivery systems.   Identify the patient's willingness to quit or cut down.    Intervene using the  €œ5A's\" technique:   Ask: Identify and document tobacco use status for every patient at every visit.   Assess willingness to make a quit attempt now. "   Advise: In a clear, strong and personalized manner, urge every tobacco user to quit.   Assist: For the patient willing to make a quit attempt, use counseling and pharmacologic therapy (nicotine replacement, antidepressant, nicotinic receptor partial agonist) to help quit.   Arrange: Schedule follow-up contact, in person or by telephone, preferably within the first week after the quit date.    Notes:            Task Due Date Last Modified     Promote Smoking Cessation --  4/24/2023  9:34 AM by Nellie Flannery RN     Care Management Activities:      - family or caregiver participation in treatment plan encouraged      Notes:                 Diabetes Type 2 (Adult)         Problem Priority Last Modified     Glycemic Management (Diabetes, Type 2) --  4/24/2023  9:33 AM by Nellie Flannery RN              Goal Recent Progress Last Modified     Glycemic Management Optimized --  4/24/2023  9:33 AM by Nellie Flannery RN     Evidence-based guidance:   Anticipate A1C testing (point-of-care) every 3 to 6 months based on goal attainment.   Review mutually-set A1C goal or target range.   Anticipate use of antihyperglycemic with or without insulin and periodic adjustments; consider active involvement of pharmacist.   Provide medical nutrition therapy and development of individualized eating.   Compare self-reported symptoms of hypo or hyperglycemia to blood glucose levels, diet and fluid intake, current medications, psychosocial and physiologic stressors, change in activity and barriers to care adherence.   Promote self-monitoring of blood glucose levels.   Assess and address barriers to management plan, such as food insecurity, age, developmental ability, depression, anxiety, fear of hypoglycemia or weight gain, as well as medication cost, side effects and complicated regimen.   Consider referral to community-based diabetes education program, visiting nurse, community health worker or health .   Encourage regular dental  care for treatment of periodontal disease; refer to dental provider when needed.    Notes:            Task Due Date Last Modified     Alleviate Barriers to Glycemic Management --  4/24/2023 11:07 AM by Nellie Flannery RN     Care Management Activities:      - blood glucose monitoring encouraged  - use of blood glucose monitoring log promoted      Notes:              Problem Priority Last Modified     Disease Progression (Diabetes, Type 2) --  4/24/2023  9:33 AM by Nellie Flannery RN              Goal Recent Progress Last Modified     Disease Progression Prevented or Minimized --  4/24/2023  9:33 AM by Nellie Flannery RN     Evidence-based guidance:   Prepare patient for laboratory and diagnostic exams based on risk and presentation.   Encourage lifestyle changes, such as increased intake of plant-based foods, stress reduction, consistent physical activity and smoking cessation to prevent long-term complications and chronic disease.    Individualize activity and exercise recommendations while considering potential limitations, such as neuropathy, retinopathy or the ability to prevent hyperglycemia or hypoglycemia.    Prepare patient for use of pharmacologic therapy that may include antihypertensive, analgesic, prostaglandin E1 with periodic adjustments, based on presenting chronic condition and laboratory results.   Assess signs/symptoms and risk factors for hypertension, sleep-disordered breathing, neuropathy (including changes in gait and balance), retinopathy, nephropathy and sexual dysfunction.   Address pregnancy planning and contraceptive choice, especially when prescribing antihypertensive or statin.   Ensure completion of annual comprehensive foot exam and dilated eye exam.    Implement additional individualized goals and interventions based on identified risk factors.   Prepare patient for consultation or referral for specialist care, such as ophthalmology, neurology, cardiology, podiatry, nephrology or  perinatology.    Notes:            Task Due Date Last Modified     Monitor and Manage Follow-up for Comorbidities --  4/24/2023  9:44 AM by Nellie Flannery RN     Care Management Activities:      - completion of annual dilated eye exam confirmed  - completion of annual foot exam verified  - healthy lifestyle promoted      Notes:                      · Current Specialty Plan of Care Status not yet initiated    Instructions   · Patient was provided an electronic copy of care plan  · CCM services were explained and offered and patient has accepted these services.  · Patient has given their written consent to receive CCM services and understands that this includes the authorization of electronic communication of medical information with the other treating providers.  · Patient understands that they may stop CCM services at any time and these changes will be effective at the end of the calendar month and will effectively revocate the agreement of CCM services.  · Patient understands that only one practitioner can furnish and be paid for CCM services during one calendar month.  Patient also understands that there may be co-payment or deductible fees in association with CCM services.  · Patient will continue with at least monthly follow-up calls with the Ambulatory .    Nellie GRIMALDO  Ambulatory Case Management    4/27/2023, 15:30 EDT

## 2023-05-01 ENCOUNTER — TELEPHONE (OUTPATIENT)
Dept: INTERNAL MEDICINE | Facility: CLINIC | Age: 63
End: 2023-05-01

## 2023-05-01 ENCOUNTER — TELEPHONE (OUTPATIENT)
Dept: INTERNAL MEDICINE | Facility: CLINIC | Age: 63
End: 2023-05-01
Payer: MEDICARE

## 2023-05-01 NOTE — TELEPHONE ENCOUNTER
Caller: Carmelo Saucedo    Relationship: Self    Best call back number: [phone]    What medication are you requesting: DIABETIC SHOES     What are your current symptoms: DIABETES     How long have you been experiencing symptoms:     Have you had these symptoms before:    [] Yes  [] No    Have you been treated for these symptoms before:   [] Yes  [] No    If a prescription is needed, what is your preferred pharmacy and phone number: [facility] [place] [facility] [place] [phone] [facility] [phone]      Additional notes: PATIENT STATES HE HAS SPOKEN WITH MARCIO AT THE PHARMACY AND WAS TOLD A MEDICAL DOCTOR WILL NEED TO SIGN THE ORDER FOR THE DIABETIC SHOES FOR MEDICARE TO COVER THE SHOES. PATIENT STATES MARCIO STATED THE PRESCRIPTION SENT WAS CORRECT BUT JUST NEEDS A MEDICAL DOCTOR SIGNATURE. MARCIO STATED THE PRESCRIPTION WAS SENT OVER FOR DIABETIC FOOT WARE AND MAY WANT TO CHANGE THE PRESCRIPTION TO DIABETIC SHOES TO BE SAFE.

## 2023-05-01 NOTE — TELEPHONE ENCOUNTER
Caller: [facility]     Best call back number: [phone]    What medication are you requesting: PHARMACY NEEDS THE ORDER FOR DIABETIC SHOES RE FAXED AS THIS DID NOT ALL COME THROUGH    If a prescription is needed, what is your preferred pharmacy and phone number: [facility] - [place], [place] - [address] [place] [address] DRIVE - [phone]  - [phone] FX     Additional notes:

## 2023-05-08 ENCOUNTER — PATIENT OUTREACH (OUTPATIENT)
Dept: CASE MANAGEMENT | Facility: OTHER | Age: 63
End: 2023-05-08
Payer: MEDICARE

## 2023-05-08 DIAGNOSIS — E11.69 TYPE 2 DIABETES MELLITUS WITH OTHER SPECIFIED COMPLICATION, WITHOUT LONG-TERM CURRENT USE OF INSULIN: ICD-10-CM

## 2023-05-08 DIAGNOSIS — N18.31 STAGE 3A CHRONIC KIDNEY DISEASE: Primary | ICD-10-CM

## 2023-05-08 DIAGNOSIS — M19.90 ARTHRITIS: ICD-10-CM

## 2023-05-08 NOTE — OUTREACH NOTE
AMBULATORY CASE MANAGEMENT NOTE    Name and Relationship of Patient/Support Person: Carmelo Saucedo John Paul - Self    CCM Interim Update    Spoke with patient as a CCM follow up call. Patient reports his FSBS has been 120-130. He reports difficulty affording healthy food. He has smoked 1 ppd x 60 years. Discussed ways to begin to quit by establishing why he wants to quit, picking a quit date and smoking 1-2 less cigarettes per day. He is agreeable to discuss this with his wife as she smokes too. Patient asked about labs being stable, explained what this means medically and discussed ways to further protect his kidneys. Patient inquired about diabetic shoes. Spoke with [facility] at [facility] and messaged provider for further assistance. Patient agreeable for continued CCM services.     Jess CAMPBELL  Ambulatory Case Management    5/8/2023, 14:51 EDT

## 2023-05-17 ENCOUNTER — TELEPHONE (OUTPATIENT)
Dept: INTERNAL MEDICINE | Facility: CLINIC | Age: 63
End: 2023-05-17

## 2023-05-17 NOTE — TELEPHONE ENCOUNTER
Let pt know will need appt to determine if abx will be needed. Pt states wouldn't be able to come in for few days and will call back, then hung up.

## 2023-05-17 NOTE — TELEPHONE ENCOUNTER
Caller: Carmelo Saucedo    Relationship: Self    Best call back number: [phone]    What medication are you requesting: ANTIBIOTIC     What are your current symptoms: CHEST CONGESTION AND COUGH WITH DARK PHLEGM    How long have you been experiencing symptoms: 1 WEEK    If a prescription is needed, what is your preferred pharmacy and phone number:    [facility] [phone]  Additional notes:    PATIENT HAS BEEN HAVING THESE SYMPTOMS FOR OVER A WEEK AND TOOK A COVID TEST AND IT WAS NEGATIVE. PLEASE ADVISE.

## 2023-05-22 DIAGNOSIS — B00.9 HSV-2 INFECTION: ICD-10-CM

## 2023-05-26 RX ORDER — ACYCLOVIR 400 MG/1
TABLET ORAL
Qty: 180 TABLET | Refills: 0 | Status: SHIPPED | OUTPATIENT
Start: 2023-05-26

## 2023-05-30 ENCOUNTER — PATIENT OUTREACH (OUTPATIENT)
Dept: CASE MANAGEMENT | Facility: OTHER | Age: 63
End: 2023-05-30

## 2023-05-30 DIAGNOSIS — M19.90 ARTHRITIS: ICD-10-CM

## 2023-05-30 DIAGNOSIS — N18.31 STAGE 3A CHRONIC KIDNEY DISEASE: Primary | ICD-10-CM

## 2023-05-30 DIAGNOSIS — Z72.0 TOBACCO USE: ICD-10-CM

## 2023-05-30 DIAGNOSIS — E11.69 TYPE 2 DIABETES MELLITUS WITH OTHER SPECIFIED COMPLICATION, WITHOUT LONG-TERM CURRENT USE OF INSULIN: ICD-10-CM

## 2023-05-30 NOTE — OUTREACH NOTE
AMBULATORY CASE MANAGEMENT NOTE    Name and Relationship of Patient/Support Person: Carmelo Saucedo - Self    CCM Interim Update    Attempted to reach patient as a CCM follow up call. Unable to leave  for a return call.     Jess CAMPBELL  Ambulatory Case Management    5/30/2023, 11:46 EDT

## 2023-05-30 NOTE — OUTREACH NOTE
"[facility] End of Month Documentation    This Chronic Medical Management Care Plan for Carmelo Saucedo, 62 y.o. male, has been monitored and managed; reviewed and a new plan of care implemented for the month of May.  A cumulative time of 44 minutes was spent on this patient record this month, including phone call with patient; chart review.    Regarding the patient's problems: has Diabetes mellitus, type 2; Diabetic peripheral neuropathy; Gastroesophageal reflux disease with esophagitis; Hypertension; Hypothyroidism; Low back pain; Congestive cardiomyopathy; Chronic obstructive pulmonary disease; Anxiety disorder; Arthritis; CHF (congestive heart failure); Chronic pain; Chronic pancreatitis; Depression; Osteoarthritis; Schizoaffective disorder, bipolar type; Tobacco use; H/O tobacco use, presenting hazards to health; Migraine without status migrainosus, not intractable; Hyperlipidemia; Balanitis; Insomnia; Smoker; Floppy eyelid syndrome; Floaters; Epiretinal membrane (ERM) of right eye; Dry eyes, bilateral; Cyst, eyelid, left; Combined form of age-related cataract, both eyes; Bilateral hypertensive retinopathy; Stage 3a chronic kidney disease; and Benign prostatic hyperplasia with nocturia on their problem list., the following items were addressed: medical records; medications and any changes can be found within the plan section of the note.  A detailed listing of time spent for chronic care management is tracked within each outreach encounter.  Current medications include:  has a current medication list which includes the following prescription(s): acyclovir, albuterol sulfate hfa, aspirin, atorvastatin, duloxetine, gabapentin, glucose blood, glucose monitor, hydrochlorothiazide, hydrocodone-acetaminophen, insulin glargine, pen needles 3/16\", combivent respimat, ipratropium-albuterol, levothyroxine, losartan, metformin, metoprolol succinate xl, pantoprazole, sildenafil, tamsulosin, trazodone, turmeric, [DISCONTINUED] " "clonazepam, [DISCONTINUED] freestyle, [DISCONTINUED] nitroglycerin, [DISCONTINUED] topiramate, and [DISCONTINUED] venlafaxine xr. and the patient is reported to be patient is compliant with medication protocol,  Medications are reported to be No data recorded. All notes on chart for PCP to review.    The patient was monitored remotely for blood glucose; activity level.    The patient's physical needs include:  DME supplies.     The patient's mental support needs include:  continued support    The patient's cognitive support needs include:  continued support    The patient's psychosocial support needs include:  continued support    The patient's functional needs include: DME; medication education    The patient's environmental needs include:  not applicable    Care Plan overall comments:  No data recorded    Refer to previous outreach notes for more information on the areas listed above.    Monthly Billing Diagnoses  (N18.31) Stage 3a chronic kidney disease    (E11.69) Type 2 diabetes mellitus with other specified complication, without long-term current use of insulin    (M19.90) Arthritis    (Z72.0) Tobacco use    Medications   · Medications have been reconciled    Care Plan progress this month:      Recently Modified Care Plans Updates made since 4/29/2023 12:00 AM     Wellness (Adult)         Problem Priority Last Modified     Tobacco Use (Wellness) --  4/24/2023  9:33 AM by Nellie Flannery RN              Goal Recent Progress Last Modified     Tobacco Use Managed --  4/24/2023  9:33 AM by Nellie Flannery RN     Evidence-based guidance:   Identify tobacco use status including cigarette, cigar, roll-your-own, dissolvable, hookah or smokeless tobacco, nicotine gels, vapes, e-cigarettes or other electronic delivery systems.   Identify the patient's willingness to quit or cut down.    Intervene using the  €œ5A's\" technique:   Ask: Identify and document tobacco use status for every patient at every visit.   Assess " willingness to make a quit attempt now.   Advise: In a clear, strong and personalized manner, urge every tobacco user to quit.   Assist: For the patient willing to make a quit attempt, use counseling and pharmacologic therapy (nicotine replacement, antidepressant, nicotinic receptor partial agonist) to help quit.   Arrange: Schedule follow-up contact, in person or by telephone, preferably within the first week after the quit date.    Notes:            Task Due Date Last Modified     Promote Smoking Cessation --  5/8/2023  2:48 PM by Jess Rosen RN     Care Management Activities:      - barriers to change identified  - family or caregiver participation in treatment plan encouraged  - participation in community support group encouraged  - stage of change monitored      Notes: 5/8/23                Diabetes Type 2 (Adult)         Problem Priority Last Modified     Glycemic Management (Diabetes, Type 2) --  4/24/2023  9:33 AM by Nellie Flannery RN              Goal Recent Progress Last Modified     Glycemic Management Optimized --  4/24/2023  9:33 AM by Nellie Flannery RN     Evidence-based guidance:   Anticipate A1C testing (point-of-care) every 3 to 6 months based on goal attainment.   Review mutually-set A1C goal or target range.   Anticipate use of antihyperglycemic with or without insulin and periodic adjustments; consider active involvement of pharmacist.   Provide medical nutrition therapy and development of individualized eating.   Compare self-reported symptoms of hypo or hyperglycemia to blood glucose levels, diet and fluid intake, current medications, psychosocial and physiologic stressors, change in activity and barriers to care adherence.   Promote self-monitoring of blood glucose levels.   Assess and address barriers to management plan, such as food insecurity, age, developmental ability, depression, anxiety, fear of hypoglycemia or weight gain, as well as medication cost, side effects and complicated  regimen.   Consider referral to community-based diabetes education program, visiting nurse, community health worker or health .   Encourage regular dental care for treatment of periodontal disease; refer to dental provider when needed.    Notes:            Task Due Date Last Modified     Alleviate Barriers to Glycemic Management --  5/8/2023  2:49 PM by Jess Rosen RN     Care Management Activities:      - barriers to adherence to treatment plan identified  - blood glucose monitoring encouraged  - blood glucose readings reviewed  - resources required to improve adherence to care identified  - use of blood glucose monitoring log promoted      Notes: 5/8/2023             Problem Priority Last Modified     Disease Progression (Diabetes, Type 2) --  4/24/2023  9:33 AM by Nellie Flannery RN              Goal Recent Progress Last Modified     Disease Progression Prevented or Minimized --  4/24/2023  9:33 AM by Nellie Flannery RN     Evidence-based guidance:   Prepare patient for laboratory and diagnostic exams based on risk and presentation.   Encourage lifestyle changes, such as increased intake of plant-based foods, stress reduction, consistent physical activity and smoking cessation to prevent long-term complications and chronic disease.    Individualize activity and exercise recommendations while considering potential limitations, such as neuropathy, retinopathy or the ability to prevent hyperglycemia or hypoglycemia.    Prepare patient for use of pharmacologic therapy that may include antihypertensive, analgesic, prostaglandin E1 with periodic adjustments, based on presenting chronic condition and laboratory results.   Assess signs/symptoms and risk factors for hypertension, sleep-disordered breathing, neuropathy (including changes in gait and balance), retinopathy, nephropathy and sexual dysfunction.   Address pregnancy planning and contraceptive choice, especially when prescribing antihypertensive or  statin.   Ensure completion of annual comprehensive foot exam and dilated eye exam.    Implement additional individualized goals and interventions based on identified risk factors.   Prepare patient for consultation or referral for specialist care, such as ophthalmology, neurology, cardiology, podiatry, nephrology or perinatology.    Notes:            Task Due Date Last Modified     Monitor and Manage Follow-up for Comorbidities --  5/8/2023  2:50 PM by Jess Rosen RN     Care Management Activities:      - activity based on tolerance and functional limitations encouraged  - healthy lifestyle promoted  - reduction of sedentary activity encouraged      Notes: 5/8/2023                     · Current Specialty Plan of Care Status signed by both patient and provider    Instructions   · Patient was provided an electronic copy of care plan  · CCM services were explained and offered and patient has accepted these services.  · Patient has given their written consent to receive CCM services and understands that this includes the authorization of electronic communication of medical information with the other treating providers.  · Patient understands that they may stop CCM services at any time and these changes will be effective at the end of the calendar month and will effectively revocate the agreement of CCM services.  · Patient understands that only one practitioner can furnish and be paid for CCM services during one calendar month.  Patient also understands that there may be co-payment or deductible fees in association with CCM services.  · Patient will continue with at least monthly follow-up calls with the Ambulatory .    Jess CAMPBELL  Ambulatory Case Management    5/30/2023, 11:49 EDT

## 2023-06-01 ENCOUNTER — HOSPITAL ENCOUNTER (OUTPATIENT)
Dept: GENERAL RADIOLOGY | Facility: HOSPITAL | Age: 63
Discharge: HOME | End: 2023-06-01
Admitting: NURSE PRACTITIONER

## 2023-06-01 ENCOUNTER — OFFICE VISIT (OUTPATIENT)
Dept: INTERNAL MEDICINE | Facility: CLINIC | Age: 63
End: 2023-06-01

## 2023-06-01 VITALS
WEIGHT: 209 LBS | DIASTOLIC BLOOD PRESSURE: 70 MMHG | HEART RATE: 56 BPM | TEMPERATURE: 97.1 F | BODY MASS INDEX: 27.7 KG/M2 | SYSTOLIC BLOOD PRESSURE: 106 MMHG | HEIGHT: 73 IN | OXYGEN SATURATION: 97 %

## 2023-06-01 DIAGNOSIS — J44.1 CHRONIC OBSTRUCTIVE PULMONARY DISEASE WITH ACUTE EXACERBATION: Primary | ICD-10-CM

## 2023-06-01 DIAGNOSIS — R05.2 SUBACUTE COUGH: ICD-10-CM

## 2023-06-01 DIAGNOSIS — J44.1 CHRONIC OBSTRUCTIVE PULMONARY DISEASE WITH ACUTE EXACERBATION: ICD-10-CM

## 2023-06-01 DIAGNOSIS — F17.200 SMOKER: ICD-10-CM

## 2023-06-01 DIAGNOSIS — R09.89 CHEST CONGESTION: ICD-10-CM

## 2023-06-01 PROCEDURE — 71046 X-RAY EXAM CHEST 2 VIEWS: CPT

## 2023-06-01 RX ORDER — PREDNISONE 20 MG/1
40 TABLET ORAL DAILY
Qty: 10 TABLET | Refills: 0 | Status: SHIPPED | OUTPATIENT
Start: 2023-06-01 | End: 2023-06-06

## 2023-06-01 RX ORDER — GUAIFENESIN 600 MG/1
1200 TABLET, EXTENDED RELEASE ORAL 2 TIMES DAILY
Qty: 20 TABLET | Refills: 0 | Status: SHIPPED | OUTPATIENT
Start: 2023-06-01 | End: 2023-06-06

## 2023-06-01 RX ORDER — DEXTROMETHORPHAN HYDROBROMIDE AND PROMETHAZINE HYDROCHLORIDE 15; 6.25 MG/5ML; MG/5ML
5 SYRUP ORAL 2 TIMES DAILY PRN
Qty: 180 ML | Refills: 0 | Status: SHIPPED | OUTPATIENT
Start: 2023-06-01

## 2023-06-01 NOTE — PROGRESS NOTES
Office Note     Name: Carmelo Saucedo    : 1960     MRN: [id number]     Chief Complaint  Cough (Going on 1 month. Went to [facility] in May received antibiotics. He has also been using promethazine cough syrup. Nothing is helping ) and URI (Wife diagnosed with URI last night. )    Subjective     History of Present Illness:  Carmelo Saucedo is a 62 y.o. male who presents today for complaints of ongoing cough.  Patient reports onset of cough was about 1 month ago.  Patient went to urgent care on  and was treated with a Z-Michael.  Patient reports symptoms have been ongoing.  He describes the cough as productive with a green, brown, yellowish colored thick sputum.  He does have COPD and is a current smoker.  He smokes about 1 pack/day.  He also reports his wife was just recently diagnosed with a upper respiratory infection last night at urgent care.  He has been using his albuterol inhaler, Combivent inhaler, and trilogy inhaler at home.  He also used some Promethazine DM last night which he reports was somewhat helpful.  He has not been using his nebulizer machine recently.  He presents today for evaluation of the above acute complaints.  He denies further complaints or concerns at this time.  He normally follows with Karlene for chronic conditions.    Review of Systems   Constitutional: Negative.    HENT: Positive for congestion.    Respiratory: Positive for cough, chest tightness and shortness of breath. Negative for wheezing.    Cardiovascular: Negative.        Past Medical History:   Diagnosis Date   • Abnormal EKG    • Angular cheilitis    • Arthritis    • Benign prostatic hypertrophy    • Bipolar disorder    • Chest pain    • Chronic pain    • COPD (chronic obstructive pulmonary disease)    • Enlarged prostate without lower urinary tract symptoms (luts)    • Esophageal reflux    • Hematochezia    • Herpes simplex infection    • Hyperlipidemia    • Hypertension    • Insomnia    • Lower back pain    •  Nephrolithiasis    • Peptic ulcer    • Pulmonary nodule    • Rectal polyp    • Rectal polyp    • Sialoadenitis    • Skin ulcer of neck    • Uncontrolled type 2 diabetes mellitus with complication        Past Surgical History:   Procedure Laterality Date   • ADENOIDECTOMY     • CHOLECYSTECTOMY     • GALLBLADDER SURGERY     • KNEE SURGERY Bilateral     release x5   • SKIN CANCER DESTRUCTION     • TONSILLECTOMY         Social History     Socioeconomic History   • Marital status:    Tobacco Use   • Smoking status: Every Day     Packs/day: 2.00     Years: 50.00     Pack years: 100.00     Types: Cigarettes   • Smokeless tobacco: Never   Vaping Use   • Vaping Use: Never used   Substance and Sexual Activity   • Alcohol use: No   • Drug use: No   • Sexual activity: Defer         Current Outpatient Medications:   •  acyclovir (ZOVIRAX) 400 MG tablet, TAKE 1 TABLET BY MOUTH TWICE DAILY TAKE  NO  MORE  THAN  5  DOSES  A  DAY, Disp: 180 tablet, Rfl: 0  •  albuterol sulfate  (90 Base) MCG/ACT inhaler, Inhale 2 puffs Every 4 (Four) Hours As Needed for Wheezing or Shortness of Air., Disp: 18 g, Rfl: 3  •  aspirin 81 MG chewable tablet, Chew 2 tablets Daily., Disp: , Rfl:   •  atorvastatin (LIPITOR) 20 MG tablet, Take 1 tablet by mouth every night at bedtime., Disp: 90 tablet, Rfl: 1  •  DULoxetine (CYMBALTA) 60 MG capsule, Take 1 capsule by mouth Daily., Disp: 30 capsule, Rfl: 1  •  gabapentin (NEURONTIN) 600 MG tablet, Take 1 tablet by mouth Every 6 (Six) Hours., Disp: , Rfl:   •  glucose blood test strip, Use as instructed to check blood sugar three times daily, Disp: 200 each, Rfl: 6  •  glucose monitor monitoring kit, 1 each Daily. Patient requests One Touch Glucose Meter.  Please substitute covered meter per insurance if needed., Disp: 1 each, Rfl: 0  •  hydroCHLOROthiazide (MICROZIDE) 12.5 MG capsule, Take 1 capsule by mouth Daily., Disp: 90 capsule, Rfl: 1  •  HYDROcodone-acetaminophen (NORCO) 7.5-325 MG per  "tablet, Take 1 tablet by mouth 3 (Three) Times a Day., Disp: , Rfl: 0  •  Insulin Glargine (LANTUS SOLOSTAR) 100 UNIT/ML injection pen, Inject 10 Units under the skin into the appropriate area as directed Every Night., Disp: 9 mL, Rfl: 1  •  Insulin Pen Needle (Pen Needles 3/16\") 31G X 5 MM misc, 1 each Every Night., Disp: 90 each, Rfl: 3  •  ipratropium-albuterol (Combivent Respimat)  MCG/ACT inhaler, Inhale 1 puff 4 (Four) Times a Day., Disp: 12 g, Rfl: 1  •  ipratropium-albuterol (DUONEB) 0.5-2.5 mg/3 ml nebulizer, Take 3 mL by nebulization Every 6 (Six) Hours., Disp: 120 vial, Rfl: 3  •  levothyroxine (SYNTHROID, LEVOTHROID) 88 MCG tablet, Take 1 tablet by mouth Every Morning Before Breakfast., Disp: 90 tablet, Rfl: 1  •  losartan (COZAAR) 25 MG tablet, Take 1 tablet by mouth Daily. Replaces lisinopril, Disp: 90 tablet, Rfl: 1  •  metFORMIN (GLUCOPHAGE) 1000 MG tablet, Take 1 tablet by mouth 2 (Two) Times a Day With Meals., Disp: 180 tablet, Rfl: 1  •  metoprolol succinate XL (TOPROL-XL) 50 MG 24 hr tablet, Take 1 tablet by mouth Daily., Disp: 90 tablet, Rfl: 1  •  pantoprazole (PROTONIX) 40 MG EC tablet, Take 1 tablet by mouth Daily., Disp: 90 tablet, Rfl: 1  •  sildenafil (REVATIO) 20 MG tablet, TAKE TWO TO THREE TABLETS BY MOUTH ONE HOUR PRIOR TO INTERCOURSE, Disp: 10 tablet, Rfl: 0  •  tamsulosin (FLOMAX) 0.4 MG capsule 24 hr capsule, Take 2 capsules by mouth every night at bedtime., Disp: 180 capsule, Rfl: 1  •  traZODone (DESYREL) 100 MG tablet, Take 2 tablets by mouth every night at bedtime., Disp: 180 tablet, Rfl: 1  •  Turmeric 500 MG capsule, Take 1 capsule by mouth Daily., Disp: 30 capsule, Rfl: 0  •  guaiFENesin (Mucinex) 600 MG 12 hr tablet, Take 2 tablets by mouth 2 (Two) Times a Day for 5 days., Disp: 20 tablet, Rfl: 0  •  predniSONE (DELTASONE) 20 MG tablet, Take 2 tablets by mouth Daily for 5 days., Disp: 10 tablet, Rfl: 0  •  promethazine-dextromethorphan (PROMETHAZINE-DM) 6.25-15 MG/5ML " "syrup, Take 5 mL by mouth 2 (Two) Times a Day As Needed for Cough., Disp: 180 mL, Rfl: 0    Objective     Vital Signs  /70   Pulse 56   Temp 97.1 °F (36.2 °C)   Ht 185.4 cm (72.99\")   Wt 94.8 kg (209 lb)   SpO2 97%   BMI 27.58 kg/m²   Estimated body mass index is 27.58 kg/m² as calculated from the following:    Height as of this encounter: 185.4 cm (72.99\").    Weight as of this encounter: 94.8 kg (209 lb).    BMI is >= 25 and <30. (Overweight) The following options were offered after discussion;: Not addressed at this visit      Physical Exam  Constitutional:       Appearance: Normal appearance.   HENT:      Head: Normocephalic and atraumatic.      Nose: Nose normal.   Eyes:      Extraocular Movements: Extraocular movements intact.      Conjunctiva/sclera: Conjunctivae normal.      Pupils: Pupils are equal, round, and reactive to light.   Cardiovascular:      Rate and Rhythm: Normal rate and regular rhythm.      Heart sounds: Normal heart sounds.   Pulmonary:      Effort: Pulmonary effort is normal. No respiratory distress.      Breath sounds: Normal breath sounds. No wheezing or rhonchi.      Comments: Cough noted during exam  Musculoskeletal:         General: Normal range of motion.      Cervical back: Normal range of motion and neck supple.   Skin:     General: Skin is warm and dry.   Neurological:      General: No focal deficit present.      Mental Status: He is alert and oriented to person, place, and time. Mental status is at baseline.   Psychiatric:         Mood and Affect: Mood normal.         Behavior: Behavior normal.         Thought Content: Thought content normal.         Judgment: Judgment normal.          Assessment and Plan     Diagnoses and all orders for this visit:    1. Chronic obstructive pulmonary disease with acute exacerbation (Primary)  -     XR Chest PA & Lateral; Future  -     predniSONE (DELTASONE) 20 MG tablet; Take 2 tablets by mouth Daily for 5 days.  Dispense: 10 tablet; " Refill: 0    2. Subacute cough  -     XR Chest PA & Lateral; Future  -     predniSONE (DELTASONE) 20 MG tablet; Take 2 tablets by mouth Daily for 5 days.  Dispense: 10 tablet; Refill: 0  -     guaiFENesin (Mucinex) 600 MG 12 hr tablet; Take 2 tablets by mouth 2 (Two) Times a Day for 5 days.  Dispense: 20 tablet; Refill: 0  -     promethazine-dextromethorphan (PROMETHAZINE-DM) 6.25-15 MG/5ML syrup; Take 5 mL by mouth 2 (Two) Times a Day As Needed for Cough.  Dispense: 180 mL; Refill: 0    3. Chest congestion  -     guaiFENesin (Mucinex) 600 MG 12 hr tablet; Take 2 tablets by mouth 2 (Two) Times a Day for 5 days.  Dispense: 20 tablet; Refill: 0  -     promethazine-dextromethorphan (PROMETHAZINE-DM) 6.25-15 MG/5ML syrup; Take 5 mL by mouth 2 (Two) Times a Day As Needed for Cough.  Dispense: 180 mL; Refill: 0    4. Smoker    Plan:  Orders placed for chest x-ray.  Will review x-ray results with patient once received.  Prescription for prednisone 40 mg daily for 5 days sent to pharmacy on file.  Prescriptions for Mucinex and Promethazine DM also sent to pharmacy on file.  Continue with albuterol inhaler as needed.  Continue with Combivent and Trelegy inhalers daily.  Sample of trilogy inhaler provided to the patient today.  Instructed the patient to ensure to rinse out his mouth after use.  Continue with good oral hydration.  Further plan of care based on x-ray results.  Return to clinic if symptoms worsen or fail to improve with current plan of care.  Go to ED for further evaluation if any symptom worsens or develops respiratory distress.  Keep scheduled follow-up appointment with Karlene.      Follow Up  Return if symptoms worsen or fail to improve.    RADHA Hudson    Part of this note may be an electronic transcription/translation of spoken language to printed text using the Dragon Dictation System.

## 2023-06-06 DIAGNOSIS — E11.69 TYPE 2 DIABETES MELLITUS WITH OTHER SPECIFIED COMPLICATION, WITHOUT LONG-TERM CURRENT USE OF INSULIN: ICD-10-CM

## 2023-06-06 DIAGNOSIS — E11.42 DIABETIC PERIPHERAL NEUROPATHY: Primary | ICD-10-CM

## 2023-06-12 ENCOUNTER — TELEPHONE (OUTPATIENT)
Dept: INTERNAL MEDICINE | Facility: CLINIC | Age: 63
End: 2023-06-12
Payer: MEDICARE

## 2023-06-12 DIAGNOSIS — E11.69 TYPE 2 DIABETES MELLITUS WITH OTHER SPECIFIED COMPLICATION, WITHOUT LONG-TERM CURRENT USE OF INSULIN: ICD-10-CM

## 2023-06-12 DIAGNOSIS — E11.69 TYPE 2 DIABETES MELLITUS WITH OTHER SPECIFIED COMPLICATION, WITHOUT LONG-TERM CURRENT USE OF INSULIN: Primary | ICD-10-CM

## 2023-06-12 RX ORDER — INSULIN DETEMIR 100 [IU]/ML
10 INJECTION, SOLUTION SUBCUTANEOUS NIGHTLY
Qty: 9 ML | Refills: 3 | Status: SHIPPED | OUTPATIENT
Start: 2023-06-12

## 2023-06-12 NOTE — TELEPHONE ENCOUNTER
We will switch to alternative long acting insulin. New Rx sent for levimir as recommended by pharmacy. Be sure Mr Saucedo knows this is in place of the  Lantus. Same dosing-- 10 units nightly.  Continue monitoring your blood sugar and let me know if any problems picking up this medication/ coverage or s/e once you begin before f/u on 7/19. Otherwise, I will see him back then.

## 2023-06-12 NOTE — TELEPHONE ENCOUNTER
"Caller: Carmelo Saucedo John Paul    Relationship: Self    Best call back number: [phone]     What medications are you currently taking:   Current Outpatient Medications on File Prior to Visit   Medication Sig Dispense Refill    acyclovir (ZOVIRAX) 400 MG tablet TAKE 1 TABLET BY MOUTH TWICE DAILY TAKE  NO  MORE  THAN  5  DOSES  A   tablet 0    albuterol sulfate  (90 Base) MCG/ACT inhaler Inhale 2 puffs Every 4 (Four) Hours As Needed for Wheezing or Shortness of Air. 18 g 3    aspirin 81 MG chewable tablet Chew 2 tablets Daily.      atorvastatin (LIPITOR) 20 MG tablet Take 1 tablet by mouth every night at bedtime. 90 tablet 1    DULoxetine (CYMBALTA) 60 MG capsule Take 1 capsule by mouth Daily. 30 capsule 1    gabapentin (NEURONTIN) 600 MG tablet Take 1 tablet by mouth Every 6 (Six) Hours.      glucose blood test strip Use as instructed to check blood sugar three times daily 200 each 6    glucose monitor monitoring kit 1 each Daily. Patient requests One Touch Glucose Meter.  Please substitute covered meter per insurance if needed. 1 each 0    hydroCHLOROthiazide (MICROZIDE) 12.5 MG capsule Take 1 capsule by mouth Daily. 90 capsule 1    HYDROcodone-acetaminophen (NORCO) 7.5-325 MG per tablet Take 1 tablet by mouth 3 (Three) Times a Day.  0    Insulin Glargine (LANTUS SOLOSTAR) 100 UNIT/ML injection pen Inject 10 Units under the skin into the appropriate area as directed Every Night. 9 mL 1    Insulin Pen Needle (Pen Needles 3/16\") 31G X 5 MM misc 1 each Every Night. 90 each 3    ipratropium-albuterol (Combivent Respimat)  MCG/ACT inhaler Inhale 1 puff 4 (Four) Times a Day. 12 g 1    ipratropium-albuterol (DUONEB) 0.5-2.5 mg/3 ml nebulizer Take 3 mL by nebulization Every 6 (Six) Hours. 120 vial 3    levothyroxine (SYNTHROID, LEVOTHROID) 88 MCG tablet Take 1 tablet by mouth Every Morning Before Breakfast. 90 tablet 1    losartan (COZAAR) 25 MG tablet Take 1 tablet by mouth Daily. Replaces lisinopril 90 " tablet 1    metFORMIN (GLUCOPHAGE) 1000 MG tablet Take 1 tablet by mouth 2 (Two) Times a Day With Meals. 180 tablet 1    metoprolol succinate XL (TOPROL-XL) 50 MG 24 hr tablet Take 1 tablet by mouth Daily. 90 tablet 1    pantoprazole (PROTONIX) 40 MG EC tablet Take 1 tablet by mouth Daily. 90 tablet 1    promethazine-dextromethorphan (PROMETHAZINE-DM) 6.25-15 MG/5ML syrup Take 5 mL by mouth 2 (Two) Times a Day As Needed for Cough. 180 mL 0    sildenafil (REVATIO) 20 MG tablet TAKE TWO TO THREE TABLETS BY MOUTH ONE HOUR PRIOR TO INTERCOURSE 10 tablet 0    tamsulosin (FLOMAX) 0.4 MG capsule 24 hr capsule Take 2 capsules by mouth every night at bedtime. 180 capsule 1    traZODone (DESYREL) 100 MG tablet Take 2 tablets by mouth every night at bedtime. 180 tablet 1    Turmeric 500 MG capsule Take 1 capsule by mouth Daily. 30 capsule 0    [DISCONTINUED] clonazePAM (KlonoPIN) 0.5 MG tablet Take 0.5 tablets by mouth 2 (Two) Times a Day As Needed for Anxiety. 60 tablet 1    [DISCONTINUED] Lancets (FREESTYLE) lancets FOR ONCE DAILY TESTING 100 each 3    [DISCONTINUED] nitroglycerin (Nitrostat) 0.4 MG SL tablet Place 1 tablet under the tongue Every 5 (Five) Minutes As Needed for Chest Pain. Only take up to 3 tablets. Call 911 if pain persists. 30 tablet 1    [DISCONTINUED] topiramate (TOPAMAX) 50 MG tablet Take 2 tablets by mouth every night at bedtime. 180 tablet 1    [DISCONTINUED] venlafaxine XR (Effexor XR) 75 MG 24 hr capsule Take 1 capsule by mouth Daily. 30 capsule 0     No current facility-administered medications on file prior to visit.      Which medication are you concerned about: CORINNE YOUNG     Who prescribed you this medication: MADAY HANDY    What are your concerns: PATIENT STATES THAT THIS INSULIN IS NO LONGER BEING COVERED ON HIS INSURANCE. HE NEEDS TO GET AN ALTERNATIVE. PHARMACY RECOMMENDED LEVEMIR.    How long have you had these concerns: 1 DAY

## 2023-07-25 ENCOUNTER — PATIENT OUTREACH (OUTPATIENT)
Dept: CASE MANAGEMENT | Facility: OTHER | Age: 63
End: 2023-07-25
Payer: MEDICARE

## 2023-07-25 DIAGNOSIS — E11.69 TYPE 2 DIABETES MELLITUS WITH OTHER SPECIFIED COMPLICATION, WITHOUT LONG-TERM CURRENT USE OF INSULIN: ICD-10-CM

## 2023-07-25 DIAGNOSIS — N18.31 STAGE 3A CHRONIC KIDNEY DISEASE: Primary | ICD-10-CM

## 2023-07-25 DIAGNOSIS — M19.90 ARTHRITIS: ICD-10-CM

## 2023-07-25 NOTE — OUTREACH NOTE
"[facility] End of Month Documentation    This Chronic Medical Management Care Plan for Carmelo Saucedo, 62 y.o. male, has been monitored and managed and a new plan of care implemented for the month of July.  A cumulative time of 22 minutes was spent on this patient record this month, including phone call with patient; chart review; phone call with other providers.    Regarding the patient's problems: has Diabetes mellitus, type 2; Diabetic peripheral neuropathy; Gastroesophageal reflux disease with esophagitis; Hypertension; Hypothyroidism; Low back pain; Congestive cardiomyopathy; Chronic obstructive pulmonary disease; Anxiety disorder; Arthritis; CHF (congestive heart failure); Chronic pain; Chronic pancreatitis; Depression; Osteoarthritis; Schizoaffective disorder, bipolar type; Tobacco use; H/O tobacco use, presenting hazards to health; Migraine without status migrainosus, not intractable; Hyperlipidemia; Balanitis; Insomnia; Smoker; Floppy eyelid syndrome; Floaters; Epiretinal membrane (ERM) of right eye; Dry eyes, bilateral; Cyst, eyelid, left; Combined form of age-related cataract, both eyes; Bilateral hypertensive retinopathy; Stage 3a chronic kidney disease; and Benign prostatic hyperplasia with nocturia on their problem list., the following items were addressed: medical records and any changes can be found within the plan section of the note.  A detailed listing of time spent for chronic care management is tracked within each outreach encounter.  Current medications include:  has a current medication list which includes the following prescription(s): acyclovir, albuterol sulfate hfa, aspirin, atorvastatin, duloxetine, gabapentin, glucose blood, glucose monitor, hydrochlorothiazide, hydrocodone-acetaminophen, levemir flexpen, pen needles 3/16\", combivent respimat, ipratropium-albuterol, levothyroxine, losartan, metformin, metoprolol succinate xl, pantoprazole, promethazine-dextromethorphan, sildenafil, tamsulosin, " trazodone, turmeric, [DISCONTINUED] clonazepam, [DISCONTINUED] freestyle, [DISCONTINUED] nitroglycerin, [DISCONTINUED] topiramate, and [DISCONTINUED] venlafaxine xr. and the patient is reported to be patient is compliant with medication protocol,  Medications are reported to be effective. All notes on chart for PCP to review.    The patient was monitored remotely for blood glucose; activity level.    The patient's physical needs include:  DME supplies.     The patient's mental support needs include:  continued support    The patient's cognitive support needs include:  continued support    The patient's psychosocial support needs include:  continued support    The patient's functional needs include: DME; physician referral    The patient's environmental needs include:  not applicable    Care Plan overall comments:  No data recorded    Refer to previous outreach notes for more information on the areas listed above.    Monthly Billing Diagnoses  (N18.31) Stage 3a chronic kidney disease    (E11.69) Type 2 diabetes mellitus with other specified complication, without long-term current use of insulin    (M19.90) Arthritis    Medications   Medications have been reconciled    Care Plan progress this month:      Recently Modified Care Plans Updates made since 6/24/2023 12:00 AM      No recently modified care plans.            Current Specialty Plan of Care Status signed by both patient and provider    Instructions   Patient was provided an electronic copy of care plan  CCM services were explained and offered and patient has accepted these services.  Patient has given their written consent to receive CCM services and understands that this includes the authorization of electronic communication of medical information with the other treating providers.  Patient understands that they may stop CCM services at any time and these changes will be effective at the end of the calendar month and will effectively revocate the agreement of  CCM services.  Patient understands that only one practitioner can furnish and be paid for CCM services during one calendar month.  Patient also understands that there may be co-payment or deductible fees in association with CCM services.  Patient will continue with at least monthly follow-up calls with the Ambulatory .    Jess CAMPBELL  Ambulatory Case Management    7/25/2023, 08:56 EDT

## 2023-08-02 DIAGNOSIS — J44.1 CHRONIC OBSTRUCTIVE PULMONARY DISEASE WITH ACUTE EXACERBATION: ICD-10-CM

## 2023-08-02 RX ORDER — IPRATROPIUM BROMIDE AND ALBUTEROL 20; 100 UG/1; UG/1
1 SPRAY, METERED RESPIRATORY (INHALATION) 4 TIMES DAILY
Qty: 12 G | Refills: 1 | Status: SHIPPED | OUTPATIENT
Start: 2023-08-02

## 2023-08-03 ENCOUNTER — TELEPHONE (OUTPATIENT)
Dept: INTERNAL MEDICINE | Facility: CLINIC | Age: 63
End: 2023-08-03

## 2023-08-03 DIAGNOSIS — Z87.891 PERSONAL HISTORY OF TOBACCO USE, PRESENTING HAZARDS TO HEALTH: Primary | ICD-10-CM

## 2023-08-03 DIAGNOSIS — Z12.2 ENCOUNTER FOR SCREENING FOR LUNG CANCER: ICD-10-CM

## 2023-08-03 NOTE — TELEPHONE ENCOUNTER
Provider: MADAY HANDY     Caller: CINDY JOSUE     Phone Number: [phone]     Reason for Call: PATIENT STATES HE RECEIVED A LETTER IN THE MAIL FORM WHERE HE GETS HIS LUNG CANCER SCREENINGS AND THEY SAID HE IS OVER DUE FOR HIS SCREENING SO HE WOULD LIKE TO KNOW IF HE HAS AN ORDER FOR THAT SCREENING AND IF HE DOES NOT HE NEEDS ONE PUT IN.

## 2023-08-15 ENCOUNTER — TELEPHONE (OUTPATIENT)
Dept: INTERNAL MEDICINE | Facility: CLINIC | Age: 63
End: 2023-08-15

## 2023-08-15 ENCOUNTER — OFFICE VISIT (OUTPATIENT)
Dept: INTERNAL MEDICINE | Facility: CLINIC | Age: 63
End: 2023-08-15
Payer: MEDICARE

## 2023-08-15 ENCOUNTER — LAB (OUTPATIENT)
Dept: LAB | Facility: HOSPITAL | Age: 63
End: 2023-08-15
Payer: MEDICARE

## 2023-08-15 VITALS
SYSTOLIC BLOOD PRESSURE: 120 MMHG | DIASTOLIC BLOOD PRESSURE: 74 MMHG | BODY MASS INDEX: 26.77 KG/M2 | OXYGEN SATURATION: 97 % | WEIGHT: 202 LBS | HEART RATE: 70 BPM | TEMPERATURE: 97.8 F | HEIGHT: 73 IN | RESPIRATION RATE: 18 BRPM

## 2023-08-15 DIAGNOSIS — I10 HYPERTENSION, UNSPECIFIED TYPE: ICD-10-CM

## 2023-08-15 DIAGNOSIS — R79.9 ABNORMAL FINDING OF BLOOD CHEMISTRY, UNSPECIFIED: ICD-10-CM

## 2023-08-15 DIAGNOSIS — R53.81 MALAISE AND FATIGUE: ICD-10-CM

## 2023-08-15 DIAGNOSIS — Z12.11 COLON CANCER SCREENING: ICD-10-CM

## 2023-08-15 DIAGNOSIS — E78.2 MIXED HYPERLIPIDEMIA: ICD-10-CM

## 2023-08-15 DIAGNOSIS — R07.9 CHEST PAIN, UNSPECIFIED TYPE: ICD-10-CM

## 2023-08-15 DIAGNOSIS — E11.69 TYPE 2 DIABETES MELLITUS WITH OTHER SPECIFIED COMPLICATION, WITHOUT LONG-TERM CURRENT USE OF INSULIN: ICD-10-CM

## 2023-08-15 DIAGNOSIS — G62.9 PERIPHERAL POLYNEUROPATHY: ICD-10-CM

## 2023-08-15 DIAGNOSIS — E67.3 HYPERVITAMINOSIS D: ICD-10-CM

## 2023-08-15 DIAGNOSIS — E11.69 TYPE 2 DIABETES MELLITUS WITH OTHER SPECIFIED COMPLICATION, WITHOUT LONG-TERM CURRENT USE OF INSULIN: Primary | ICD-10-CM

## 2023-08-15 DIAGNOSIS — Z12.5 ENCOUNTER FOR SCREENING FOR MALIGNANT NEOPLASM OF PROSTATE: ICD-10-CM

## 2023-08-15 DIAGNOSIS — N41.1 CHRONIC PROSTATITIS: ICD-10-CM

## 2023-08-15 DIAGNOSIS — R53.83 MALAISE AND FATIGUE: ICD-10-CM

## 2023-08-15 DIAGNOSIS — K21.9 GASTROESOPHAGEAL REFLUX DISEASE, UNSPECIFIED WHETHER ESOPHAGITIS PRESENT: ICD-10-CM

## 2023-08-15 DIAGNOSIS — E03.9 ACQUIRED HYPOTHYROIDISM: ICD-10-CM

## 2023-08-15 DIAGNOSIS — R53.83 OTHER FATIGUE: ICD-10-CM

## 2023-08-15 DIAGNOSIS — F32.A DEPRESSION, UNSPECIFIED DEPRESSION TYPE: ICD-10-CM

## 2023-08-15 DIAGNOSIS — M17.12 PRIMARY OSTEOARTHRITIS OF LEFT KNEE: ICD-10-CM

## 2023-08-15 DIAGNOSIS — B00.9 HSV-2 INFECTION: ICD-10-CM

## 2023-08-15 DIAGNOSIS — I10 ESSENTIAL HYPERTENSION: ICD-10-CM

## 2023-08-15 LAB
ALBUMIN UR-MCNC: <1.2 MG/DL
CREAT UR-MCNC: 82.7 MG/DL
DEPRECATED RDW RBC AUTO: 45.5 FL (ref 37–54)
ERYTHROCYTE [DISTWIDTH] IN BLOOD BY AUTOMATED COUNT: 13.2 % (ref 12.3–15.4)
EXPIRATION DATE: NORMAL
HBA1C MFR BLD: 6.5 %
HCT VFR BLD AUTO: 44.5 % (ref 37.5–51)
HGB BLD-MCNC: 15.3 G/DL (ref 13–17.7)
Lab: NORMAL
MCH RBC QN AUTO: 32.3 PG (ref 26.6–33)
MCHC RBC AUTO-ENTMCNC: 34.4 G/DL (ref 31.5–35.7)
MCV RBC AUTO: 93.9 FL (ref 79–97)
MICROALBUMIN/CREAT UR: NORMAL MG/G{CREAT}
PLATELET # BLD AUTO: 265 10*3/MM3 (ref 140–450)
PMV BLD AUTO: 12.3 FL (ref 6–12)
RBC # BLD AUTO: 4.74 10*6/MM3 (ref 4.14–5.8)
WBC NRBC COR # BLD: 11.03 10*3/MM3 (ref 3.4–10.8)

## 2023-08-15 PROCEDURE — 85027 COMPLETE CBC AUTOMATED: CPT

## 2023-08-15 PROCEDURE — 82728 ASSAY OF FERRITIN: CPT

## 2023-08-15 PROCEDURE — 83540 ASSAY OF IRON: CPT

## 2023-08-15 PROCEDURE — G0103 PSA SCREENING: HCPCS

## 2023-08-15 PROCEDURE — 36415 COLL VENOUS BLD VENIPUNCTURE: CPT

## 2023-08-15 PROCEDURE — 80053 COMPREHEN METABOLIC PANEL: CPT

## 2023-08-15 PROCEDURE — 82306 VITAMIN D 25 HYDROXY: CPT

## 2023-08-15 PROCEDURE — 82570 ASSAY OF URINE CREATININE: CPT

## 2023-08-15 PROCEDURE — 82746 ASSAY OF FOLIC ACID SERUM: CPT

## 2023-08-15 PROCEDURE — 84443 ASSAY THYROID STIM HORMONE: CPT

## 2023-08-15 PROCEDURE — 82607 VITAMIN B-12: CPT

## 2023-08-15 PROCEDURE — 82043 UR ALBUMIN QUANTITATIVE: CPT

## 2023-08-15 PROCEDURE — 84466 ASSAY OF TRANSFERRIN: CPT

## 2023-08-15 RX ORDER — METOPROLOL SUCCINATE 50 MG/1
50 TABLET, EXTENDED RELEASE ORAL DAILY
Qty: 90 TABLET | Refills: 3 | Status: SHIPPED | OUTPATIENT
Start: 2023-08-15

## 2023-08-15 RX ORDER — LOSARTAN POTASSIUM 25 MG/1
25 TABLET ORAL DAILY
Qty: 90 TABLET | Refills: 3 | Status: SHIPPED | OUTPATIENT
Start: 2023-08-15

## 2023-08-15 RX ORDER — ACYCLOVIR 400 MG/1
400 TABLET ORAL 2 TIMES DAILY
Qty: 180 TABLET | Refills: 3 | Status: SHIPPED | OUTPATIENT
Start: 2023-08-15

## 2023-08-15 RX ORDER — DULOXETIN HYDROCHLORIDE 60 MG/1
60 CAPSULE, DELAYED RELEASE ORAL DAILY
Qty: 30 CAPSULE | Refills: 1 | Status: SHIPPED | OUTPATIENT
Start: 2023-08-15

## 2023-08-15 RX ORDER — HYDROCHLOROTHIAZIDE 12.5 MG/1
12.5 CAPSULE, GELATIN COATED ORAL DAILY
Qty: 90 CAPSULE | Refills: 3 | Status: SHIPPED | OUTPATIENT
Start: 2023-08-15

## 2023-08-15 RX ORDER — LEVOTHYROXINE SODIUM 88 UG/1
88 TABLET ORAL
Qty: 90 TABLET | Refills: 3 | Status: SHIPPED | OUTPATIENT
Start: 2023-08-15

## 2023-08-15 RX ORDER — ATORVASTATIN CALCIUM 20 MG/1
20 TABLET, FILM COATED ORAL
Qty: 90 TABLET | Refills: 3 | Status: SHIPPED | OUTPATIENT
Start: 2023-08-15

## 2023-08-15 RX ORDER — TAMSULOSIN HYDROCHLORIDE 0.4 MG/1
2 CAPSULE ORAL
Qty: 180 CAPSULE | Refills: 3 | Status: SHIPPED | OUTPATIENT
Start: 2023-08-15

## 2023-08-15 RX ORDER — PANTOPRAZOLE SODIUM 40 MG/1
40 TABLET, DELAYED RELEASE ORAL DAILY
Qty: 90 TABLET | Refills: 3 | Status: SHIPPED | OUTPATIENT
Start: 2023-08-15

## 2023-08-15 RX ORDER — DULOXETIN HYDROCHLORIDE 30 MG/1
30 CAPSULE, DELAYED RELEASE ORAL DAILY
Qty: 7 CAPSULE | Refills: 0 | Status: SHIPPED | OUTPATIENT
Start: 2023-08-15 | End: 2023-08-22

## 2023-08-15 NOTE — PROGRESS NOTES
Follow Up Office Visit      Date: 08/15/2023   Patient Name: Carmelo Saucedo  : 1960   MRN: [id number]     Chief Complaint:    Chief Complaint   Patient presents with    Fatigue     Pt states being tired/fatigued, insulin issue due to change in rx    Diabetes    Blurred Vision       History of Present Illness: Carmelo Saucedo is a 62 y.o. male who is here today for an acute visit.    The patient consented to the use of KIARA.    The patient presents to the clinic for a same day sick visit.  He complains of fatigue and general malaise.  He notes that his symptoms started about 3 weeks ago which is when he switched from Solostar long-acting insulin.  He wonders if his symptoms are attributable to medication side effect.  The recent change in insulin was mandatory per his insurance.    Fatigue.   Patient complains of worsening fatigue.   He has been trying to stay active, but continues to be fatigued.   Fatigue was noted when he started taking the Levemir injections.   He had a cardiac work up last 2023 which was reportedly normal.   CBC last 2023 did not show any anemia.     Type 2 diabetes.   Insurance was not able to cover the Solostar and had to take the Levemir instead.   He has been on the Levemir for 3 weeks now.   Last 2023, patient gave himself an insulin injection after not eating anything and had an episode of hypoglycemia.   Patient does not monitor his blood glucose daily.   His blood glucose last 2023 was 105 prior to eating and 120 after a meal.   Blood glucose this morning was 135 without taking the insulin last night.   He also complains of bruises on his abdomen from the injection site.   His hemoglobin A1c last 2023 was 6.7 percent.  Today it is 6.5%    Chest pain.   Patient complains of chest pain with associated numbness and tingling sensation on both arms.   It can be associated with occasional dizziness upon standing up.   Chest x-ray last 2023 was  unremarkable.   He mentioned having a cardiac catheterization and nuclear stress test 10 years ago.   He saw a cardiologist, Dr. Cano in the past.     Blurred vision.   He has been experiencing blurred vision.   Last appointment with his ophthalmologist was in 2022, but he is scheduled this 09/2023.     Anxiety and depression.   Patient reports a history of anxiety and depression for which he took lithium in the past.  He has not been taking his duloxetine which was originally prescribed for peripheral neuropathy.  He does note continued burning sensation in his lower extremities bilaterally.  Specifically he notes feeling unhappy with his current living situation.    Tobacco use.   Patient is a current smoker.   He wanted to quit smoking, but states that he is dealing with a lot of stress.   He is scheduled for a CT lung cancer screening on 08/29/2023. Chest x-ray performed June 1 was unremarkable    Healthcare maintenance.   Patient is due for his colon cancer screening.   Last colonoscopy was 8 years ago showing multiple polyps with repeat in 3 years.   He needed prescription refills on acyclovir, atorvastatin, hydrochlorothiazide, levothyroxine, losartan, metoprolol, pantoprazole, and tamsulosin.          Subjective          I have reviewed the patients family history, social history, past medical history, past surgical history and have updated it as appropriate.     Medications:     Current Outpatient Medications:     acyclovir (ZOVIRAX) 400 MG tablet, Take 1 tablet by mouth 2 (Two) Times a Day. Take no more than 5 doses a day., Disp: 180 tablet, Rfl: 3    albuterol sulfate  (90 Base) MCG/ACT inhaler, Inhale 2 puffs Every 4 (Four) Hours As Needed for Wheezing or Shortness of Air., Disp: 18 g, Rfl: 3    aspirin 81 MG chewable tablet, Chew 2 tablets Daily., Disp: , Rfl:     atorvastatin (LIPITOR) 20 MG tablet, Take 1 tablet by mouth every night at bedtime., Disp: 90 tablet, Rfl: 3    DULoxetine  "(CYMBALTA) 60 MG capsule, Take 1 capsule by mouth Daily., Disp: 30 capsule, Rfl: 1    gabapentin (NEURONTIN) 600 MG tablet, Take 1 tablet by mouth Every 6 (Six) Hours., Disp: , Rfl:     glucose blood test strip, Use as instructed to check blood sugar three times daily, Disp: 200 each, Rfl: 6    glucose monitor monitoring kit, 1 each Daily. Patient requests One Touch Glucose Meter.  Please substitute covered meter per insurance if needed., Disp: 1 each, Rfl: 0    hydroCHLOROthiazide (MICROZIDE) 12.5 MG capsule, Take 1 capsule by mouth Daily., Disp: 90 capsule, Rfl: 3    HYDROcodone-acetaminophen (NORCO) 7.5-325 MG per tablet, Take 1 tablet by mouth 3 (Three) Times a Day., Disp: , Rfl: 0    insulin detemir (Levemir FlexPen) 100 UNIT/ML injection, Inject 10 Units under the skin into the appropriate area as directed Every Night., Disp: 9 mL, Rfl: 3    Insulin Pen Needle (Pen Needles 3/16\") 31G X 5 MM misc, 1 each Every Night., Disp: 90 each, Rfl: 3    ipratropium-albuterol (Combivent Respimat)  MCG/ACT inhaler, Inhale 1 puff 4 (Four) Times a Day., Disp: 12 g, Rfl: 1    ipratropium-albuterol (DUONEB) 0.5-2.5 mg/3 ml nebulizer, Take 3 mL by nebulization Every 6 (Six) Hours., Disp: 120 vial, Rfl: 3    levothyroxine (SYNTHROID, LEVOTHROID) 88 MCG tablet, Take 1 tablet by mouth Every Morning Before Breakfast., Disp: 90 tablet, Rfl: 3    losartan (COZAAR) 25 MG tablet, Take 1 tablet by mouth Daily. Replaces lisinopril, Disp: 90 tablet, Rfl: 3    metFORMIN (GLUCOPHAGE) 1000 MG tablet, Take 1 tablet by mouth 2 (Two) Times a Day With Meals., Disp: 180 tablet, Rfl: 1    metoprolol succinate XL (TOPROL-XL) 50 MG 24 hr tablet, Take 1 tablet by mouth Daily., Disp: 90 tablet, Rfl: 3    pantoprazole (PROTONIX) 40 MG EC tablet, Take 1 tablet by mouth Daily., Disp: 90 tablet, Rfl: 3    promethazine-dextromethorphan (PROMETHAZINE-DM) 6.25-15 MG/5ML syrup, Take 5 mL by mouth 2 (Two) Times a Day As Needed for Cough., Disp: 180 mL, " "Rfl: 0    sildenafil (REVATIO) 20 MG tablet, TAKE TWO TO THREE TABLETS BY MOUTH ONE HOUR PRIOR TO INTERCOURSE, Disp: 10 tablet, Rfl: 0    tamsulosin (FLOMAX) 0.4 MG capsule 24 hr capsule, Take 2 capsules by mouth every night at bedtime., Disp: 180 capsule, Rfl: 3    traZODone (DESYREL) 100 MG tablet, Take 2 tablets by mouth every night at bedtime., Disp: 180 tablet, Rfl: 1    Turmeric 500 MG capsule, Take 1 capsule by mouth Daily., Disp: 30 capsule, Rfl: 0    DULoxetine (CYMBALTA) 30 MG capsule, Take 1 capsule by mouth Daily for 7 days., Disp: 7 capsule, Rfl: 0    DULoxetine (CYMBALTA) 60 MG capsule, Take 1 capsule by mouth Daily., Disp: 30 capsule, Rfl: 1    Allergies:   Allergies   Allergen Reactions    Lisinopril Cough       Objective     Physical Exam: Please see above  Vital Signs:   Vitals:    08/15/23 0856   BP: 120/74   Pulse: 70   Resp: 18   Temp: 97.8 øF (36.6 øC)   SpO2: 97%   Weight: 91.6 kg (202 lb)   Height: 185.4 cm (72.99\")   PainSc: 0-No pain     Body mass index is 26.66 kg/mý.    Physical Exam  Vitals and nursing note reviewed.   Constitutional:       General: He is not in acute distress.     Appearance: Normal appearance. He is overweight. He is not ill-appearing.   HENT:      Head: Normocephalic and atraumatic.      Mouth/Throat:      Mouth: Mucous membranes are moist.      Pharynx: Oropharynx is clear.   Eyes:      Pupils: Pupils are equal, round, and reactive to light.   Cardiovascular:      Rate and Rhythm: Normal rate and regular rhythm.      Heart sounds: Normal heart sounds.   Pulmonary:      Effort: Pulmonary effort is normal. No respiratory distress.      Breath sounds: Normal breath sounds.   Abdominal:      General: Abdomen is flat. Bowel sounds are normal.      Palpations: Abdomen is soft.      Tenderness: There is no abdominal tenderness.   Musculoskeletal:      Right lower leg: No edema.      Left lower leg: No edema.   Skin:     General: Skin is warm and dry.   Neurological:      " General: No focal deficit present.      Mental Status: He is alert and oriented to person, place, and time. Mental status is at baseline.      Cranial Nerves: No cranial nerve deficit.      Motor: No weakness.      Coordination: Coordination normal.      Gait: Gait normal.   Psychiatric:         Mood and Affect: Mood normal.         Behavior: Behavior normal.         Thought Content: Thought content normal.         Judgment: Judgment normal.         ECG 12 Lead    Date/Time: 8/15/2023 9:51 AM  Performed by: Karlene Humphries APRN  Authorized by: Karlene Humphries APRN   Comparison: compared with previous ECG from 4/19/2023  Similar to previous ECG  Rhythm: sinus rhythm        Results:   Imaging:     Labs:        Assessment / Plan      Assessment/Plan:   Diagnoses and all orders for this visit:    1. Type 2 diabetes mellitus with other specified complication, without long-term current use of insulin (Primary)  -     POC Glycosylated Hemoglobin (Hb A1C)  -     Microalbumin / Creatinine Urine Ratio - Urine, Clean Catch; Future    2. Other fatigue  -     ECG 12 Lead    3. Malaise and fatigue  -     CBC (No Diff); Future  -     Comprehensive Metabolic Panel; Future  -     TSH Rfx On Abnormal To Free T4; Future  -     PSA Screen; Future  -     Vitamin B12; Future  -     Folate; Future  -     Vitamin D 25 hydroxy; Future  -     Iron Profile; Future  -     Ferritin; Future    4. Encounter for screening for malignant neoplasm of prostate  -     PSA Screen; Future    5. Abnormal finding of blood chemistry, unspecified  -     Iron Profile; Future  -     Ferritin; Future    6. Hypervitaminosis D  -     Vitamin D 25 hydroxy; Future    7. Chest pain, unspecified type  -     ECG 12 Lead    8. HSV-2 infection  -     acyclovir (ZOVIRAX) 400 MG tablet; Take 1 tablet by mouth 2 (Two) Times a Day. Take no more than 5 doses a day.  Dispense: 180 tablet; Refill: 3    9. Mixed hyperlipidemia  -     atorvastatin (LIPITOR) 20 MG  tablet; Take 1 tablet by mouth every night at bedtime.  Dispense: 90 tablet; Refill: 3    10. Essential hypertension  -     hydroCHLOROthiazide (MICROZIDE) 12.5 MG capsule; Take 1 capsule by mouth Daily.  Dispense: 90 capsule; Refill: 3    11. Acquired hypothyroidism  -     levothyroxine (SYNTHROID, LEVOTHROID) 88 MCG tablet; Take 1 tablet by mouth Every Morning Before Breakfast.  Dispense: 90 tablet; Refill: 3    12. Hypertension, unspecified type  -     losartan (COZAAR) 25 MG tablet; Take 1 tablet by mouth Daily. Replaces lisinopril  Dispense: 90 tablet; Refill: 3  -     metoprolol succinate XL (TOPROL-XL) 50 MG 24 hr tablet; Take 1 tablet by mouth Daily.  Dispense: 90 tablet; Refill: 3    13. Gastroesophageal reflux disease, unspecified whether esophagitis present  -     pantoprazole (PROTONIX) 40 MG EC tablet; Take 1 tablet by mouth Daily.  Dispense: 90 tablet; Refill: 3    14. Chronic prostatitis  Comments:  Start finasteride to see if it helps otherwise will switch Flomax to uroxatral.  Orders:  -     tamsulosin (FLOMAX) 0.4 MG capsule 24 hr capsule; Take 2 capsules by mouth every night at bedtime.  Dispense: 180 capsule; Refill: 3    15. Primary osteoarthritis of left knee    16. Colon cancer screening  -     Cologuard - Stool, Per Rectum; Future    17. Peripheral polyneuropathy  -     DULoxetine (CYMBALTA) 30 MG capsule; Take 1 capsule by mouth Daily for 7 days.  Dispense: 7 capsule; Refill: 0  -     DULoxetine (CYMBALTA) 60 MG capsule; Take 1 capsule by mouth Daily.  Dispense: 30 capsule; Refill: 1    18. Depression, unspecified depression type  -     DULoxetine (CYMBALTA) 30 MG capsule; Take 1 capsule by mouth Daily for 7 days.  Dispense: 7 capsule; Refill: 0  -     DULoxetine (CYMBALTA) 60 MG capsule; Take 1 capsule by mouth Daily.  Dispense: 30 capsule; Refill: 1       Discussed normal blood glucose levels with the patient and complications of hypoglycemia and hyperglycemia.     Discussed work-up and  plan of care with patient specifically regarding his acute complaint of fatigue and general malaise.  Regarding his questions specific to whether or not one of his medications are causing visual changes--I asked him to touch base with his pharmacist and have him or her review medication list for this particular side effect.  Also, encouraged him to keep his upcoming appointment with his eye doctor for dilated eye exam.  Yes it is possible that symptoms are related to medication change with recent insulin switch-particularly since symptom onset coincides with the same time.  However, I do recommend further evaluation.  Chest imaging from 6/2023 unremarkable.  Patient overdue for colorectal screening.  Of note, Mr. Woo notes that in 8/2013 polyps were removed during colonoscopy in the specialist recommended that he return in 3 years for repeat colonoscopy.  This is now very much overdue.  He is not interested in repeat colonoscopy at this time, but he is agreeable to Cologuard which I ordered today.  He will let me know if he does not receive the kit in the mail in about 1 week time.  EKG today unremarkable.  I have requested cardiology records including the stress test.  Symptoms could be related to depression.  He is unsure if duloxetine prescription is still available at the pharmacy.  Instructed him to begin this medication as prescribed.  30 mg once daily for a week then increase to 60 mg daily thereafter.  If depression related, this medication should help.  We will also hopefully improve his peripheral neuropathy.  We will plan to meet back in about 4 weeks to recheck symptoms.  Further recommendations after I review his cardiology records.    Follow Up:   Return in about 4 weeks (around 9/12/2023) for Recheck.    RADHA Campuzano  [facility] [place]     Transcribed from ambient dictation for RADHA Pereira by Cortes Rosas.  08/15/23   11:57 EDT    Patient or patient  representative verbalized consent to the visit recording.  I have personally performed the services described in this document as transcribed by the above individual, and it is both accurate and complete.      ADDENDUM 8/29/23:   -Last colonoscopy was completed at  on 8-7-13 noting: Non-thrombosed internal hemorrhoids found in rectum. Four benign appearing 3-5 mm polyps in rectum, sigmoid, ascending colon. One 6 mm polyp in ascending colon. One 6 mm polyp in sigmoid colon. Non-bleeding internal hemorrhoids.   -I received an office note from Dr Cano, cardiology from an apt with pt on 3-31-11  -Pt was called by MA on 8/19/23 by MA with this msg: (f/u from  msg)  -His nightly levimir is prescribed 10 units qhs. Please advise pt that it is important to take all meds, especially insulin as directed. Since A1C this week showed good control of diabetes at 6.5% he can continue 6 units qhs until he sees me back on 9/12. Continue metformin as prescribed in addition to nightly levimir- but we will likely discuss alternative therapy given recent kidney dysfunction. Also very important to continue monitoring glucose reading at home (check fasting qAM and after meals) write these numbers down and bring to your apt. I will be adjusting your treatment based on this info.  -Labs from this week notable for iron deficiency. Please  OTC iron supplement  from pharmacy and begin taking once daily (~325mg or 65 elemental). No overt anemia, hgb normal. We will check a urine sample when you return to specifically screen for microscopic blood loss via urine, but in light of even mild iron deficiency-- I encourage you to consider colonoscopy as opposed to cologuard for overdue colorectal screening.  -White cell count slightly elevated-- likely due to smoking. Kidney dysfunction again noted, but stable from prior. All other labs normal-- this includes thyroid, prostate screening, B12/ folate, vitamin D, and other urine study.

## 2023-08-15 NOTE — TELEPHONE ENCOUNTER
Requested records from Dr. Cano's office      ----- Message from RADHA Gastelum sent at 8/15/2023  9:27 AM EDT -----  He saw cardiology ~10yrs ago, possibly indira, with heart w/u including stress test. Please get records. He said it may not have been thru [facility]

## 2023-08-16 DIAGNOSIS — I10 HYPERTENSION, UNSPECIFIED TYPE: ICD-10-CM

## 2023-08-16 DIAGNOSIS — E78.2 MIXED HYPERLIPIDEMIA: ICD-10-CM

## 2023-08-16 LAB
25(OH)D3 SERPL-MCNC: 55.6 NG/ML (ref 30–100)
ALBUMIN SERPL-MCNC: 4.3 G/DL (ref 3.5–5.2)
ALBUMIN/GLOB SERPL: 1.7 G/DL
ALP SERPL-CCNC: 72 U/L (ref 39–117)
ALT SERPL W P-5'-P-CCNC: 13 U/L (ref 1–41)
ANION GAP SERPL CALCULATED.3IONS-SCNC: 15.1 MMOL/L (ref 5–15)
AST SERPL-CCNC: 23 U/L (ref 1–40)
BILIRUB SERPL-MCNC: 0.3 MG/DL (ref 0–1.2)
BUN SERPL-MCNC: 15 MG/DL (ref 8–23)
BUN/CREAT SERPL: 10.3 (ref 7–25)
CALCIUM SPEC-SCNC: 10.1 MG/DL (ref 8.6–10.5)
CHLORIDE SERPL-SCNC: 102 MMOL/L (ref 98–107)
CO2 SERPL-SCNC: 23.9 MMOL/L (ref 22–29)
CREAT SERPL-MCNC: 1.45 MG/DL (ref 0.76–1.27)
EGFRCR SERPLBLD CKD-EPI 2021: 53.8 ML/MIN/1.73
FERRITIN SERPL-MCNC: 236 NG/ML (ref 30–400)
FOLATE SERPL-MCNC: 6.93 NG/ML (ref 4.78–24.2)
GLOBULIN UR ELPH-MCNC: 2.5 GM/DL
GLUCOSE SERPL-MCNC: 105 MG/DL (ref 65–99)
IRON 24H UR-MRATE: 50 MCG/DL (ref 59–158)
IRON SATN MFR SERPL: 16 % (ref 20–50)
POTASSIUM SERPL-SCNC: 3.8 MMOL/L (ref 3.5–5.2)
PROT SERPL-MCNC: 6.8 G/DL (ref 6–8.5)
PSA SERPL-MCNC: 3.67 NG/ML (ref 0–4)
SODIUM SERPL-SCNC: 141 MMOL/L (ref 136–145)
TIBC SERPL-MCNC: 313 MCG/DL (ref 298–536)
TRANSFERRIN SERPL-MCNC: 210 MG/DL (ref 200–360)
TSH SERPL DL<=0.05 MIU/L-ACNC: 0.56 UIU/ML (ref 0.27–4.2)
VIT B12 BLD-MCNC: 384 PG/ML (ref 211–946)

## 2023-08-16 RX ORDER — LOSARTAN POTASSIUM 25 MG/1
25 TABLET ORAL DAILY
Qty: 90 TABLET | Refills: 3 | OUTPATIENT
Start: 2023-08-16

## 2023-08-16 RX ORDER — ATORVASTATIN CALCIUM 20 MG/1
20 TABLET, FILM COATED ORAL
Qty: 90 TABLET | Refills: 3 | OUTPATIENT
Start: 2023-08-16

## 2023-08-16 NOTE — TELEPHONE ENCOUNTER
Caller: Carmelo Saucedo    Relationship: Self    Best call back number: [phone]    Requested Prescriptions:   Requested Prescriptions     Pending Prescriptions Disp Refills    losartan (COZAAR) 25 MG tablet 90 tablet 3     Sig: Take 1 tablet by mouth Daily. Replaces lisinopril    atorvastatin (LIPITOR) 20 MG tablet 90 tablet 3     Sig: Take 1 tablet by mouth every night at bedtime.        Pharmacy where request should be sent: [facility] [address] [place], [place] [phone] [place], [place] [phone] FX     Last office visit with prescribing clinician: 8/15/2023   Last telemedicine visit with prescribing clinician: Visit date not found   Next office visit with prescribing clinician: 9/12/2023     Additional details provided by patient: PATIENT HAS ABOUT A WEEKS WORTH LEFT AND REQUESTING 90 DAYS    Does the patient have less than a 3 day supply:  [] Yes  [x] No    Manjeet Vela Rep   08/16/23 11:01 EDT

## 2023-08-17 ENCOUNTER — PATIENT OUTREACH (OUTPATIENT)
Dept: CASE MANAGEMENT | Facility: OTHER | Age: 63
End: 2023-08-17
Payer: MEDICARE

## 2023-08-17 DIAGNOSIS — Z72.0 TOBACCO USE: ICD-10-CM

## 2023-08-17 DIAGNOSIS — N18.31 STAGE 3A CHRONIC KIDNEY DISEASE: Primary | ICD-10-CM

## 2023-08-17 DIAGNOSIS — M19.90 ARTHRITIS: ICD-10-CM

## 2023-08-17 DIAGNOSIS — E11.69 TYPE 2 DIABETES MELLITUS WITH OTHER SPECIFIED COMPLICATION, WITHOUT LONG-TERM CURRENT USE OF INSULIN: ICD-10-CM

## 2023-08-17 NOTE — OUTREACH NOTE
AMBULATORY CASE MANAGEMENT NOTE    Name and Relationship of Patient/Support Person: Carmelo Saucedo John Paul - Self    CCM Interim Update    Spoke with patient as a follow up call regarding his podiatry appointment. He stated he did not go due to potential copays. He state his Medicaid does not pay for his copays. He would like to try to get dentures. AC will look for free clinics that may be able to see him. Provided him with the number for the [facility] here in [place].     Discussed ACP, patient agreeable for me to mail information. Completed. AWV scheduled for 9/12/23 @ 1000. Patient requested provider call him about his lab results, messaged PCP with that request. Patient reports his fasting FSBS was 125, he is taking 6 units Levemir instead of 9 due to fatigue and fear of hypoglycemia. Educated patient on diet and foods that increase iron. Patient voiced understanding. He denied further questions/needs at this time.     Jess CAMPBELL  Ambulatory Case Management    8/17/2023, 17:06 EDT

## 2023-08-28 ENCOUNTER — TELEPHONE (OUTPATIENT)
Dept: INTERNAL MEDICINE | Facility: CLINIC | Age: 63
End: 2023-08-28

## 2023-08-28 NOTE — TELEPHONE ENCOUNTER
PATIENT WAS RECENTLY PRESCRIBED DULOXETINE AND WOULD LIKE TO CHECK WHAT HIS CREATNINE LEVEL CLEARANCE TO MAKE SURE HE'S GOOD FOR THIS MEDICATION. HE'S CONCERNED DUE TO HIS MOST RECENT ISSUE WITH KIDNEY FAILURE DUE TO ANOTHER MEDICATION.    PHONE: [phone]

## 2023-08-29 ENCOUNTER — PATIENT OUTREACH (OUTPATIENT)
Dept: CASE MANAGEMENT | Facility: OTHER | Age: 63
End: 2023-08-29
Payer: MEDICARE

## 2023-08-29 DIAGNOSIS — E11.69 TYPE 2 DIABETES MELLITUS WITH OTHER SPECIFIED COMPLICATION, WITHOUT LONG-TERM CURRENT USE OF INSULIN: ICD-10-CM

## 2023-08-29 DIAGNOSIS — M19.90 ARTHRITIS: ICD-10-CM

## 2023-08-29 DIAGNOSIS — N18.31 STAGE 3A CHRONIC KIDNEY DISEASE: Primary | ICD-10-CM

## 2023-08-29 NOTE — OUTREACH NOTE
"[facility] End of Month Documentation    This Chronic Medical Management Care Plan for Carmelo Saucedo, 62 y.o. male, has been monitored and managed and a new plan of care implemented for the month of August.  A cumulative time of 52 minutes was spent on this patient record this month, including phone call with patient; chart review; electronic communication with primary care provider.    Regarding the patient's problems: has Diabetes mellitus, type 2; Diabetic peripheral neuropathy; Gastroesophageal reflux disease with esophagitis; Hypertension; Hypothyroidism; Low back pain; Congestive cardiomyopathy; Chronic obstructive pulmonary disease; Anxiety disorder; Arthritis; CHF (congestive heart failure); Chronic pain; Chronic pancreatitis; Depression; Osteoarthritis; Schizoaffective disorder, bipolar type; Tobacco use; H/O tobacco use, presenting hazards to health; Migraine without status migrainosus, not intractable; Hyperlipidemia; Balanitis; Insomnia; Smoker; Floppy eyelid syndrome; Floaters; Epiretinal membrane (ERM) of right eye; Dry eyes, bilateral; Cyst, eyelid, left; Combined form of age-related cataract, both eyes; Bilateral hypertensive retinopathy; Stage 3a chronic kidney disease; and Benign prostatic hyperplasia with nocturia on their problem list., the following items were addressed: medical records and any changes can be found within the plan section of the note.  A detailed listing of time spent for chronic care management is tracked within each outreach encounter.  Current medications include:  has a current medication list which includes the following prescription(s): acyclovir, albuterol sulfate hfa, aspirin, atorvastatin, duloxetine, duloxetine, duloxetine, gabapentin, glucose blood, glucose monitor, hydrochlorothiazide, hydrocodone-acetaminophen, levemir flexpen, pen needles 3/16\", combivent respimat, ipratropium-albuterol, levothyroxine, losartan, metformin, metoprolol succinate xl, pantoprazole, " promethazine-dextromethorphan, sildenafil, tamsulosin, trazodone, turmeric, [DISCONTINUED] clonazepam, [DISCONTINUED] freestyle, [DISCONTINUED] nitroglycerin, [DISCONTINUED] topiramate, and [DISCONTINUED] venlafaxine xr. and the patient is reported to be patient is noncompliant with medication protocol,  Medications are reported to be effective.  Regarding these diagnoses, referrals were made to the following provider(s):  [facility].  All notes on chart for PCP to review.    The patient was monitored remotely for blood glucose; activity level.    The patient's physical needs include:  DME supplies.     The patient's mental support needs include:  continued support    The patient's cognitive support needs include:  continued support    The patient's psychosocial support needs include:  continued support    The patient's functional needs include: DME; physician referral    The patient's environmental needs include:  not applicable    Care Plan overall comments:  No data recorded    Refer to previous outreach notes for more information on the areas listed above.    Monthly Billing Diagnoses  (N18.31) Stage 3a chronic kidney disease    (E11.69) Type 2 diabetes mellitus with other specified complication, without long-term current use of insulin    (M19.90) Arthritis    Medications   Medications have been reconciled    Care Plan progress this month:      Recently Modified Care Plans Updates made since 7/29/2023 12:00 AM      No recently modified care plans.            Current Specialty Plan of Care Status signed by both patient and provider    Instructions   Patient was provided an electronic copy of care plan  CCM services were explained and offered and patient has accepted these services.  Patient has given their written consent to receive CCM services and understands that this includes the authorization of electronic communication of medical information with the other treating providers.  Patient understands  that they may stop CCM services at any time and these changes will be effective at the end of the calendar month and will effectively revocate the agreement of CCM services.  Patient understands that only one practitioner can furnish and be paid for CCM services during one calendar month.  Patient also understands that there may be co-payment or deductible fees in association with CCM services.  Patient will continue with at least monthly follow-up calls with the Ambulatory .    Jess CAMPBELL  Ambulatory Case Management    8/29/2023, 15:28 EDT

## 2023-08-29 NOTE — TELEPHONE ENCOUNTER
Let pt know of message. Pt verbalized understanding.  Pt states will start med, sent off the cologuard few days ago and hasn't picked up the iron yet

## 2023-08-30 ENCOUNTER — HOSPITAL ENCOUNTER (OUTPATIENT)
Dept: CT IMAGING | Facility: HOSPITAL | Age: 63
Discharge: HOME | End: 2023-08-30
Admitting: NURSE PRACTITIONER
Payer: MEDICARE

## 2023-08-30 DIAGNOSIS — Z87.891 PERSONAL HISTORY OF TOBACCO USE, PRESENTING HAZARDS TO HEALTH: ICD-10-CM

## 2023-08-30 DIAGNOSIS — Z12.2 ENCOUNTER FOR SCREENING FOR LUNG CANCER: ICD-10-CM

## 2023-08-30 PROCEDURE — 71271 CT THORAX LUNG CANCER SCR C-: CPT

## 2023-08-31 ENCOUNTER — TELEPHONE (OUTPATIENT)
Dept: INTERNAL MEDICINE | Facility: CLINIC | Age: 63
End: 2023-08-31
Payer: MEDICARE

## 2023-08-31 NOTE — TELEPHONE ENCOUNTER
----- Message from RADHA Gastelum sent at 8/31/2023  4:02 AM EDT -----  Chest CT showed no suspicious lung nodule or evidence of cancer. Recommend repeat screening in `1 yr (due 8/2024).

## 2023-09-04 DIAGNOSIS — R19.5 POSITIVE COLORECTAL CANCER SCREENING USING COLOGUARD TEST: Primary | ICD-10-CM

## 2023-09-06 ENCOUNTER — TELEPHONE (OUTPATIENT)
Dept: INTERNAL MEDICINE | Facility: CLINIC | Age: 63
End: 2023-09-06
Payer: MEDICARE

## 2023-09-06 NOTE — TELEPHONE ENCOUNTER
He will have to come in for a urine check [de-identified] : 60 year old female follow up for  rhinorrhea, difficulty breathing through the nose, post nasal drip.  States has been using the Azelastine nasal spray as prescribed with no noticeable improvement.

## 2023-09-06 NOTE — TELEPHONE ENCOUNTER
Caller: Carmelo Saucedo    Relationship: Self    Best call back number: [phone]     What is the best time to reach you: TODAY ASAP    Who are you requesting to speak with (clinical staff, provider,  specific staff member): PCP/MA        What was the call regarding: PATIENT NEEDS A COPY OF  HIS LAST 2 LAB RESULTS . HE NEEDS THEM FOR AN APPT ON FRIDAY MORNING AND WANTS TO COME PICK THOSE UP TODAY OR BY THURSDAY AT THE LATEST.PLEASE CALL PATIENT ONCE HE CAN COME PICK THOSE UP    Is it okay if the provider responds through MyChart: CALLBACK

## 2023-09-12 ENCOUNTER — OFFICE VISIT (OUTPATIENT)
Dept: INTERNAL MEDICINE | Facility: CLINIC | Age: 63
End: 2023-09-12
Payer: MEDICARE

## 2023-09-12 ENCOUNTER — TELEPHONE (OUTPATIENT)
Dept: INTERNAL MEDICINE | Facility: CLINIC | Age: 63
End: 2023-09-12

## 2023-09-12 VITALS
TEMPERATURE: 98.1 F | BODY MASS INDEX: 26.9 KG/M2 | RESPIRATION RATE: 18 BRPM | HEART RATE: 70 BPM | SYSTOLIC BLOOD PRESSURE: 128 MMHG | HEIGHT: 73 IN | WEIGHT: 203 LBS | OXYGEN SATURATION: 97 % | DIASTOLIC BLOOD PRESSURE: 70 MMHG

## 2023-09-12 DIAGNOSIS — I50.9 CONGESTIVE HEART FAILURE, UNSPECIFIED HF CHRONICITY, UNSPECIFIED HEART FAILURE TYPE: ICD-10-CM

## 2023-09-12 DIAGNOSIS — G89.29 OTHER CHRONIC PAIN: ICD-10-CM

## 2023-09-12 DIAGNOSIS — D72.829 LEUKOCYTOSIS, UNSPECIFIED TYPE: ICD-10-CM

## 2023-09-12 DIAGNOSIS — Z00.00 MEDICARE ANNUAL WELLNESS VISIT, SUBSEQUENT: Primary | ICD-10-CM

## 2023-09-12 DIAGNOSIS — R53.83 OTHER FATIGUE: ICD-10-CM

## 2023-09-12 DIAGNOSIS — H53.8 BLURRED VISION: ICD-10-CM

## 2023-09-12 DIAGNOSIS — J44.9 CHRONIC OBSTRUCTIVE PULMONARY DISEASE, UNSPECIFIED COPD TYPE: ICD-10-CM

## 2023-09-12 DIAGNOSIS — E11.69 TYPE 2 DIABETES MELLITUS WITH OTHER SPECIFIED COMPLICATION, WITHOUT LONG-TERM CURRENT USE OF INSULIN: ICD-10-CM

## 2023-09-12 DIAGNOSIS — N40.1 BENIGN PROSTATIC HYPERPLASIA WITH NOCTURIA: ICD-10-CM

## 2023-09-12 DIAGNOSIS — F17.200 SMOKER: ICD-10-CM

## 2023-09-12 DIAGNOSIS — K21.9 GASTROESOPHAGEAL REFLUX DISEASE, UNSPECIFIED WHETHER ESOPHAGITIS PRESENT: ICD-10-CM

## 2023-09-12 DIAGNOSIS — E78.2 MIXED HYPERLIPIDEMIA: ICD-10-CM

## 2023-09-12 DIAGNOSIS — G47.00 INSOMNIA, UNSPECIFIED TYPE: ICD-10-CM

## 2023-09-12 DIAGNOSIS — E61.1 IRON DEFICIENCY: ICD-10-CM

## 2023-09-12 DIAGNOSIS — F32.A DEPRESSION, UNSPECIFIED DEPRESSION TYPE: ICD-10-CM

## 2023-09-12 DIAGNOSIS — R39.9 LOWER URINARY TRACT SYMPTOMS (LUTS): ICD-10-CM

## 2023-09-12 DIAGNOSIS — Z00.00 HEALTHCARE MAINTENANCE: ICD-10-CM

## 2023-09-12 DIAGNOSIS — I10 HYPERTENSION, UNSPECIFIED TYPE: ICD-10-CM

## 2023-09-12 DIAGNOSIS — B00.9 HSV-2 INFECTION: ICD-10-CM

## 2023-09-12 DIAGNOSIS — F17.218 NICOTINE DEPENDENCE, CIGARETTES, WITH OTHER NICOTINE-INDUCED DISORDERS: ICD-10-CM

## 2023-09-12 DIAGNOSIS — E03.9 ACQUIRED HYPOTHYROIDISM: ICD-10-CM

## 2023-09-12 DIAGNOSIS — Z80.42 FAMILY HISTORY OF PROSTATE CANCER: ICD-10-CM

## 2023-09-12 DIAGNOSIS — N18.31 STAGE 3A CHRONIC KIDNEY DISEASE: ICD-10-CM

## 2023-09-12 DIAGNOSIS — E11.42 DIABETIC PERIPHERAL NEUROPATHY: ICD-10-CM

## 2023-09-12 DIAGNOSIS — R19.5 POSITIVE COLORECTAL CANCER SCREENING USING COLOGUARD TEST: ICD-10-CM

## 2023-09-12 DIAGNOSIS — Z85.828 HISTORY OF SKIN CANCER: ICD-10-CM

## 2023-09-12 DIAGNOSIS — L98.9 SKIN LESION: ICD-10-CM

## 2023-09-12 DIAGNOSIS — R35.1 BENIGN PROSTATIC HYPERPLASIA WITH NOCTURIA: ICD-10-CM

## 2023-09-12 DIAGNOSIS — I10 ESSENTIAL HYPERTENSION: ICD-10-CM

## 2023-09-12 NOTE — PROGRESS NOTES
The ABCs of the Annual Wellness Visit  Subsequent Medicare Wellness Visit    Subjective    Carmelo Saucedo is a 62 y.o. male who presents for a Subsequent Medicare Wellness Visit.    The following portions of the patient's history were reviewed and   updated as appropriate: allergies, current medications, past family history, past medical history, past social history, past surgical history, and problem list.    Compared to one year ago, the patient feels his physical   health is the same.    Compared to one year ago, the patient feels his mental   health is the same.    Recent Hospitalizations:  He was not admitted to the hospital during the last year.       Current Medical Providers:  Patient Care Team:  Karlene Humphries APRN as PCP - General (Family Medicine)  Jess Rosen RN as Ambulatory  ([facility])    Outpatient Medications Prior to Visit   Medication Sig Dispense Refill    acyclovir (ZOVIRAX) 400 MG tablet Take 1 tablet by mouth 2 (Two) Times a Day. Take no more than 5 doses a day. 180 tablet 3    albuterol sulfate  (90 Base) MCG/ACT inhaler Inhale 2 puffs Every 4 (Four) Hours As Needed for Wheezing or Shortness of Air. 18 g 3    aspirin 81 MG chewable tablet Chew 2 tablets Daily.      atorvastatin (LIPITOR) 20 MG tablet Take 1 tablet by mouth every night at bedtime. 90 tablet 3    DULoxetine (CYMBALTA) 60 MG capsule Take 1 capsule by mouth Daily. 30 capsule 1    gabapentin (NEURONTIN) 600 MG tablet Take 1 tablet by mouth Every 6 (Six) Hours.      glucose blood test strip Use as instructed to check blood sugar three times daily 200 each 6    glucose monitor monitoring kit 1 each Daily. Patient requests One Touch Glucose Meter.  Please substitute covered meter per insurance if needed. 1 each 0    hydroCHLOROthiazide (MICROZIDE) 12.5 MG capsule Take 1 capsule by mouth Daily. 90 capsule 3    HYDROcodone-acetaminophen (NORCO) 7.5-325 MG per tablet Take 1 tablet by mouth 3 (Three)  "Times a Day.  0    insulin detemir (Levemir FlexPen) 100 UNIT/ML injection Inject 10 Units under the skin into the appropriate area as directed Every Night. (Patient taking differently: Inject 8 Units under the skin into the appropriate area as directed Every Night.) 9 mL 3    Insulin Pen Needle (Pen Needles 3/16\") 31G X 5 MM misc 1 each Every Night. 90 each 3    ipratropium-albuterol (Combivent Respimat)  MCG/ACT inhaler Inhale 1 puff 4 (Four) Times a Day. 12 g 1    ipratropium-albuterol (DUONEB) 0.5-2.5 mg/3 ml nebulizer Take 3 mL by nebulization Every 6 (Six) Hours. 120 vial 3    levothyroxine (SYNTHROID, LEVOTHROID) 88 MCG tablet Take 1 tablet by mouth Every Morning Before Breakfast. 90 tablet 3    losartan (COZAAR) 25 MG tablet Take 1 tablet by mouth Daily. Replaces lisinopril 90 tablet 3    metFORMIN (GLUCOPHAGE) 1000 MG tablet Take 1 tablet by mouth 2 (Two) Times a Day With Meals. 180 tablet 1    metoprolol succinate XL (TOPROL-XL) 50 MG 24 hr tablet Take 1 tablet by mouth Daily. 90 tablet 3    pantoprazole (PROTONIX) 40 MG EC tablet Take 1 tablet by mouth Daily. 90 tablet 3    promethazine-dextromethorphan (PROMETHAZINE-DM) 6.25-15 MG/5ML syrup Take 5 mL by mouth 2 (Two) Times a Day As Needed for Cough. 180 mL 0    sildenafil (REVATIO) 20 MG tablet TAKE TWO TO THREE TABLETS BY MOUTH ONE HOUR PRIOR TO INTERCOURSE 10 tablet 0    tamsulosin (FLOMAX) 0.4 MG capsule 24 hr capsule Take 2 capsules by mouth every night at bedtime. 180 capsule 3    traZODone (DESYREL) 100 MG tablet Take 2 tablets by mouth every night at bedtime. 180 tablet 1    Turmeric 500 MG capsule Take 1 capsule by mouth Daily. 30 capsule 0    DULoxetine (CYMBALTA) 30 MG capsule Take 1 capsule by mouth Daily for 7 days. 7 capsule 0    DULoxetine (CYMBALTA) 60 MG capsule Take 1 capsule by mouth Daily. 30 capsule 1     No facility-administered medications prior to visit.       Opioid medication/s are on active medication list.  and I have " evaluated his active treatment plan and pain score trends (see table).  Vitals:    09/12/23 0916   PainSc:   5     I have reviewed the chart for potential of high risk medication and harmful drug interactions in the elderly.          Aspirin is on active medication list. Aspirin use is indicated based on review of current medical condition/s. Pros and cons of this therapy have been discussed today. Benefits of this medication outweigh potential harm.  Patient has been encouraged to continue taking this medication.  .  Regarding aspirin-- it is on his active med lsit, but when specifically asked during visit today he reports that he has NOT been taking it. Does not recall who told him to start and when/ who instructed him to hold. Instructed to continue holding for now until iron deficiency/ +cologuard is further evaluated     Patient Active Problem List   Diagnosis    Insulin-requiring or dependent type II diabetes mellitus    Diabetic peripheral neuropathy    Gastroesophageal reflux disease with esophagitis    Hypertension    Hypothyroidism    Low back pain    Congestive cardiomyopathy    Chronic obstructive pulmonary disease    Anxiety disorder    Arthritis    CHF (congestive heart failure)    Chronic pain    Chronic pancreatitis    Depression    Osteoarthritis    Schizoaffective disorder, bipolar type    Tobacco use    H/O tobacco use, presenting hazards to health    Migraine without status migrainosus, not intractable    Hyperlipidemia    Balanitis    Insomnia    Smoker    Floppy eyelid syndrome    Floaters    Epiretinal membrane (ERM) of right eye    Dry eyes, bilateral    Cyst, eyelid, left    Combined form of age-related cataract, both eyes    Bilateral hypertensive retinopathy    Stage 3a chronic kidney disease    Benign prostatic hyperplasia with nocturia     Advance Care Planning   Advance Care Planning     Advance Directive is not on file.  ACP discussion was declined by the patient. Patient does not have  "an advance directive, declines further assistance.  Advance Care Planning Discussion:  16 min or more spent on counseling         Objective    Vitals:    23 0916   BP: 128/70   Pulse: 70   Resp: 18   Temp: 98.1 °F (36.7 °C)   SpO2: 97%   Weight: 92.1 kg (203 lb)   Height: 185.4 cm (72.99\")   PainSc:   5     Estimated body mass index is 26.79 kg/m² as calculated from the following:    Height as of this encounter: 185.4 cm (72.99\").    Weight as of this encounter: 92.1 kg (203 lb).       Does the patient have evidence of cognitive impairment? No    Lab Results   Component Value Date    HGBA1C 6.5 08/15/2023        HEALTH RISK ASSESSMENT    Smoking Status:  Social History     Tobacco Use   Smoking Status Every Day    Packs/day: 2.00    Years: 50.00    Pack years: 100.00    Types: Cigarettes   Smokeless Tobacco Never     Alcohol Consumption:  Social History     Substance and Sexual Activity   Alcohol Use No     Fall Risk Screen:    STEADI Fall Risk Assessment was completed, and patient is at MODERATE risk for falls. Assessment completed on:2023    Depression Screenin/12/2023     9:21 AM   PHQ-2/PHQ-9 Depression Screening   Little Interest or Pleasure in Doing Things 3-->nearly every day   Feeling Down, Depressed or Hopeless 1-->several days   Trouble Falling or Staying Asleep, or Sleeping Too Much 3-->nearly every day   Feeling Tired or Having Little Energy 3-->nearly every day   Poor Appetite or Overeating 2-->more than half the days   Feeling Bad about Yourself - or that You are a Failure or Have Let Yourself or Your Family Down 0-->not at all   Trouble Concentrating on Things, Such as Reading the Newspaper or Watching Television 1-->several days   Moving or Speaking So Slowly that Other People Could Have Noticed? Or the Opposite - Being So Fidgety 1-->several days   Thoughts that You Would be Better Off Dead or of Hurting Yourself in Some Way 0-->not at all   PHQ-9: Brief Depression Severity " Measure Score 14   If You Checked Off Any Problems, How Difficult Have These Problems Made It For You to Do Your Work, Take Care of Things at Home, or Get Along with Other People? somewhat difficult       Health Habits and Functional and Cognitive Screenin/12/2023     9:20 AM   Functional & Cognitive Status   Do you have difficulty preparing food and eating? No   Do you have difficulty bathing yourself, getting dressed or grooming yourself? No   Do you have difficulty using the toilet? No   Do you have difficulty moving around from place to place? Yes   Do you have trouble with steps or getting out of a bed or a chair? Yes   Current Diet Well Balanced Diet   Dental Exam Not up to date   Eye Exam Up to date   Exercise (times per week) 1 times per week   Current Exercises Include No Regular Exercise;Walking   Do you need help using the phone?  No   Are you deaf or do you have serious difficulty hearing?  No   Do you need help to go to places out of walking distance? No   Do you need help shopping? No   Do you need help preparing meals?  No   Do you need help with housework?  No   Do you need help with laundry? No   Do you need help taking your medications? No   Do you need help managing money? No   Do you ever drive or ride in a car without wearing a seat belt? No   Have you felt unusual stress, anger or loneliness in the last month? Yes   Who do you live with? Spouse   If you need help, do you have trouble finding someone available to you? No   Have you been bothered in the last four weeks by sexual problems? Yes   Do you have difficulty concentrating, remembering or making decisions? No       Age-appropriate Screening Schedule:  Refer to the list below for future screening recommendations based on patient's age, sex and/or medical conditions. Orders for these recommended tests are listed in the plan section. The patient has been provided with a written plan.    Health Maintenance   Topic Date Due     COLORECTAL CANCER SCREENING  08/25/2023    ZOSTER VACCINE (1 of 2) 08/20/2025 (Originally 9/29/2008)    DIABETIC EYE EXAM  09/14/2023    Pneumococcal Vaccine 0-64 (3 - PPSV23 or PCV20) 09/29/2023    INFLUENZA VACCINE  10/01/2023    DIABETIC FOOT EXAM  01/13/2024    HEMOGLOBIN A1C  02/15/2024    LIPID PANEL  04/19/2024    BMI FOLLOWUP  06/01/2024    URINE MICROALBUMIN  08/15/2024    LUNG CANCER SCREENING  08/30/2024    ANNUAL WELLNESS VISIT  09/12/2024    HEPATITIS C SCREENING  Completed    COVID-19 Vaccine  Completed    TDAP/TD VACCINES  Discontinued                  CMS Preventative Services Quick Reference  Risk Factors Identified During Encounter  Chronic Pain: Natural history and expected course discussed. Questions answered.  Depression/Dysphoria: Prescribed new antidepressant medication treatment. Follow up visit planned.  Immunizations Discussed/Encouraged: COVID19  Tobacco Use/Dependance Risk Smoking Cessation Counseling  DX: nicotine dependence I advised the patient of the risks of continuing to use tobacco, and I offered smoking cessation materials as well as reviewed strategies and medications that could assist in quitting smoking.  Patient remains in precontemplation.Time spent counseling: > 3-10 minutes   Dental Screening Recommended  Vision Screening Recommended  The above risks/problems have been discussed with the patient.  Pertinent information has been shared with the patient in the After Visit Summary.  An After Visit Summary and PPPS were made available to the patient.    Follow Up:   Next Medicare Wellness visit to be scheduled in 1 year.       Additional E&M Note during same encounter follows:  Patient has multiple medical problems which are significant and separately identifiable that require additional work above and beyond the Medicare Wellness Visit.      Chief Complaint  Medicare Wellness-subsequent    Subjective          Carmelo Saucedo is also being seen today for subsequent MWV, and  "follow-up of acute and chronic conditions.  He agrees to utilization of [facility].  We reviewed recent lab work results in detail--notable for stable kidney dysfunction, low iron, hyperglycemia (glucose 105), and mild/stable leukocytosis (WBC 11.03).  Cologwoodrow completed 8- also positive.    Unfortunately, he lost his older sister, Ivette, last month due to brain cancer.  This is the first sibling loss he is ever experienced.    Blurred vision- ongoing and unchanged.  He is scheduled for an eye appointment and to update diabetic eye exam with Dr. Villatoro on 9/25/23.     Peripheral neuropathy- Started duloxetine ~2-3 weeks ago.  Compliant with 60 mg qd dosing.  Has not noticed any symptom improvement--denies any worsened or improved peripheral neuropathy.  Of note, he continues gabapentin as well which is prescribed by his pain management specialist.    Fatigue & general malaise- States fatigue is \"about the same\".Has not started OTC po iron as instructed.     Positive Cologwoodrow- Notes GI called to make appointment. Has appointment in 11/2023.    Chest pain- Denies cp at this time or since his last visit with me on 8/15. Endorses ongoing dyspnea that is chronic and unchanged from his baseline.     DM2- FPG have been running 100-115mg/dL. Last A1C on 8/15= 6.5%. Pt continues metformin as prev prescribed but is only taking 8 units qhs of his long acting insulin instead of the prescribed 10. He still wants diabetic shoes.     Cardiology- I did receive last office note from Dr Cano in 3/2011.     Review of Systems   Constitutional:  Positive for fatigue. Negative for activity change, appetite change, chills and fever.   HENT: Negative.     Eyes:  Positive for visual disturbance.   Respiratory:  Positive for shortness of breath.    Cardiovascular:  Negative for chest pain, palpitations and leg swelling.   Gastrointestinal: Negative.    Endocrine: Negative.    Musculoskeletal:  Negative for arthralgias, joint swelling and " "myalgias.   Skin: Negative.    Neurological:  Positive for numbness. Negative for dizziness, weakness and light-headedness.   Hematological:  Negative for adenopathy. Does not bruise/bleed easily.   Psychiatric/Behavioral: Negative.       Objective   Vital Signs:  /70   Pulse 70   Temp 98.1 °F (36.7 °C)   Resp 18   Ht 185.4 cm (72.99\")   Wt 92.1 kg (203 lb)   SpO2 97%   BMI 26.79 kg/m²     Physical Exam  Vitals and nursing note reviewed.   Constitutional:       General: He is awake. He is not in acute distress.     Appearance: Normal appearance. He is well-groomed and overweight.   HENT:      Head: Normocephalic and atraumatic.      Right Ear: Tympanic membrane, ear canal and external ear normal.      Left Ear: Tympanic membrane, ear canal and external ear normal.      Nose: Nose normal.      Mouth/Throat:      Mouth: Mucous membranes are moist.      Pharynx: Oropharynx is clear. No oropharyngeal exudate or posterior oropharyngeal erythema.   Eyes:      Extraocular Movements: Extraocular movements intact.      Conjunctiva/sclera: Conjunctivae normal.      Pupils: Pupils are equal, round, and reactive to light.   Neck:      Thyroid: No thyroid mass, thyromegaly or thyroid tenderness.      Vascular: No JVD.      Trachea: Trachea normal.   Cardiovascular:      Rate and Rhythm: Normal rate and regular rhythm.      Heart sounds: Normal heart sounds.   Pulmonary:      Effort: Pulmonary effort is normal. No respiratory distress.      Breath sounds: Normal breath sounds.   Abdominal:      General: Bowel sounds are normal. There is no distension.      Palpations: Abdomen is soft. There is no mass.      Tenderness: There is no abdominal tenderness. There is no right CVA tenderness, left CVA tenderness, guarding or rebound.   Musculoskeletal:      Cervical back: Neck supple.      Right lower leg: No edema.      Left lower leg: No edema.   Lymphadenopathy:      Cervical: No cervical adenopathy.   Skin:     General: " Skin is warm and dry.      Capillary Refill: Capillary refill takes less than 2 seconds.      Coloration: Skin is not pale.      Comments: Skin lesions: 1) Under left eye on side of upper left nose, 2) right temple, 3) right elbow.    Neurological:      General: No focal deficit present.      Mental Status: He is alert and oriented to person, place, and time. Mental status is at baseline.      Cranial Nerves: No cranial nerve deficit.      Motor: No weakness.      Coordination: Coordination normal.      Gait: Gait normal.   Psychiatric:         Attention and Perception: Attention and perception normal.         Mood and Affect: Mood and affect normal.         Speech: Speech normal.         Behavior: Behavior normal. Behavior is cooperative.         Thought Content: Thought content normal.         Cognition and Memory: Cognition and memory normal.         Judgment: Judgment normal.                   Assessment and Plan   Diagnoses and all orders for this visit:    1. Medicare annual wellness visit, subsequent (Primary)  -Advice and education given regarding routine dental evaluations, routine eye exams, reproductive health, cardiovascular risk reduction, sunscreen use, and seatbelt use (generall overall safety).  Annual wellness evaluations recommended.   -colorectal screening: referred to GI for +cologuard in 8/2023  -lung cancer screening: unremarkable LDCT 8/2023, rep 1 yr  -HCV screen: negative   -immunization: overdue zoster. Recommend annual flu & covid this fall season, once new booster available. Tdap due 2030. PNA due 9/2023.   -PSA 3.670 8/2023      2. Diabetic peripheral neuropathy  -continue duloxetine as prescribed  -f/u with podiatry (referred 6/2023)  -can try topical capsaicin. Wear gloves.   -b12 WNL, A1C overall controlled   -     Capsaicin 0.075 % stick; Apply 1 application  topically 3 (Three) Times a Day.  Dispense: 1 g; Refill: 5    Depression  -has taken effexor in the past for anxiety  -on  duloxetine for peripheral neuropathy, recently started. Continue 60mg qd and recheck on return.   -phq today =14    3. Other fatigue  -lab w/u thus far notable for iron deficiency and +cologuard   -pt also depressed but for now priority is r/o colorectal cancer as etiology/ contributing factor to iron deficiency, fatigue, and positive cologuard     4. Iron deficiency  -pt was instructed to begin OTC po iron supplementation but at this time has not yet done so  -advised to begin ferrous sulfate 325mg PO qd-qod ASAP    5. Positive colorectal cancer screening using Cologuard test  -referred to specialist   -scheduled to see Dr Damon 11-13-23    6. Leukocytosis, unspecified type  -mild but persistent. Notable smoker and also +cologuard. Will follow     7. Blurred vision  -f/u with eye doctor on 9/25/23 as scheduled     8. Skin lesion  -     Ambulatory Referral to Dermatology    9. Type 2 diabetes mellitus with other specified complication, without long-term current use of insulin  -POC Glycosylated Hemoglobin (Hb A1C) last month on 8/15= 6.5%  -continue levemir 8units qhs and metformin 1000mg bid  -notable kidney dysfunction. Will plan to repeat BMP on return and will consider alternative therapy for metformin if indicated. Of note, GLP1 agonist contraindicated-- hx pancreatitis. Unknown if he has taken jardiance in past but does have hx of groin/ yeast infections  -urine m/c ratio in 8/2023: unable to calculate  -foot exam: completed 1/2023  -eye exam: completed 9/2022, scheduled later this month   -on ARB & statin     10. Stage 3a chronic kidney disease  -overall stable. Labs 8/2023: creatinine= 1.45, GFR= 53.8  -Pt saw Dr Grayson in the past. Last note I could find on chart review was in 2020 with kidney dysfunction around the time following when pt had lithium toxicity  -notable meds on list currently include metformin and HCTZ  -follow closely and recheck kidney function on return with med changes prn  -consider  chet     11. Essential hypertension  -well controlled  -continue losartan 35mg qd and hctz 12.5mg qd    12. Congestive heart failure, unspecified HF chronicity, unspecified heart failure type  -I am unable to view any previous echo or other specific cardiac w/u in records as they pre-date EPIC go live. I did receive 1 office note from Dr Cano which is scanned in. He did previously follow with cardiology.   -continue metoprolol succinate XL 50mg qd    13. Mixed hyperlipidemia  -need repeat lipid panel in near future  -last lipid panel 4/2023: tc- 121, HDL- 21, LDL- 60, triglycerides elevated 250 (pmh pancreatitis)  -continue atorvastatin 20qd      15. History of skin cancer  -     Ambulatory Referral to Dermatology    16. Gastroesophageal reflux disease, unspecified whether esophagitis present  -controlled, continue protonix 40qd  -unsure if prev upper scope  -pt scheduled with Dr Damon in 11/2023 for further eval of positive cologuard. Overdue for colorectal screening when recent stool test completed. Sx with iron deficiency and fatigue    17. Smoker  18. Nicotine dependence, cigarettes, with other nicotine-induced disorders  -encouraged smoking cessation     19. Chronic obstructive pulmonary disease, unspecified COPD type  -controlled  -continue combivent respimat and due neb prn    20. HSV-2 infection  -controlled, continue acyclovir     21. Insomnia, unspecified type  -controlled, continue trazodone     22. Acquired hypothyroidism  -last TSH WNL 8/2023  -continue levothyroxine 88mcg qd    23. Other chronic pain  -continue f/u with Dr Stefan Elena, pain specialist, as scheduled  -Dr Elena manages norco and gabapentin Rx     24. Benign prostatic hyperplasia with nocturia  25. Lower urinary tract symptoms (LUTS)  26.  Family history of prostate cancer  -brother with prostate cancer  -pt has taken oxybutinin in the past with minimal relief  -psa 3.670 last month (prev 2.380 in 4/2022)  -well controlled  currently  -continue flomax 0.8mg qhs    27. Healthcare maintenance          I will prescribe capsaicin cream. Patient counseled on this medication. Patient advised to continue to take Cymbalta. Patient advised to attend upcoming GI appointment. Patient will have A1c evaluated.      I spent 60 minutes caring for Carmelo on this date of service. This time includes time spent by me in the following activities:preparing for the visit, reviewing tests, obtaining and/or reviewing a separately obtained history, performing a medically appropriate examination and/or evaluation , counseling and educating the patient/family/caregiver, ordering medications, tests, or procedures, referring and communicating with other health care professionals , documenting information in the medical record, independently interpreting results and communicating that information with the patient/family/caregiver, and care coordination        Follow Up   Return in about 2 months (around 11/20/2023) for Recheck.  Patient was given instructions and counseling regarding his condition or for health maintenance advice. Please see specific information pulled into the AVS if appropriate.     -f/u 2m: recheck DM2 with A1C, recheck renal fx with BMP (consider kerendia), recheck +cologuard & review GI note, recheck depression/ peripheral neuropathy-- repeat phq (consider vraylar) and review podiatry note, recheck HLD/ triglycerides & need repeat lipid soon   -SMW in 1 yr (9/2024)      Transcribed from ambient dictation for RADHA Pereira by Soni Aquino.  09/12/23   12:12 EDT    Patient or patient representative verbalized consent to the visit recording.  I have personally performed the services described in this document as transcribed by the above individual, and it is both accurate and complete.

## 2023-09-13 NOTE — TELEPHONE ENCOUNTER
HUB to read: LM to return call, please give message below.    I referred pt to podiatry in June-- referral order is authorized but based on our conversation today I don't think he has seen specialist yet. Please let Mr Saucedo know he can call Dr Mehrdad Villanueva DPM at [phone]. He will be able to order diabetic shoes and further help manage peripheral neuropathy and feet sxs.

## 2023-09-18 ENCOUNTER — PATIENT OUTREACH (OUTPATIENT)
Dept: CASE MANAGEMENT | Facility: OTHER | Age: 63
End: 2023-09-18
Payer: MEDICARE

## 2023-09-18 DIAGNOSIS — M19.90 ARTHRITIS: ICD-10-CM

## 2023-09-18 DIAGNOSIS — Z72.0 TOBACCO USE: ICD-10-CM

## 2023-09-18 DIAGNOSIS — N18.31 STAGE 3A CHRONIC KIDNEY DISEASE: Primary | ICD-10-CM

## 2023-09-18 DIAGNOSIS — E11.69 TYPE 2 DIABETES MELLITUS WITH OTHER SPECIFIED COMPLICATION, WITHOUT LONG-TERM CURRENT USE OF INSULIN: ICD-10-CM

## 2023-09-18 NOTE — OUTREACH NOTE
AMBULATORY CASE MANAGEMENT NOTE    Name and Relationship of Patient/Support Person: Carmelo Saucedo - Self    CCM Interim Update    Spoke with patient as a CCM follow up call. He has not reached out to the [facility] to inquire about dentures. Provided him with the number again and encouraged him to call. Patient reports his FSBS was 130 today and seems to be doing good.     Discussed his iron, he has not picked up his iron supplement. Encouraged him to do so and provided food sources of iron. He voiced understanding. Patient's Cologard was positive, he plans to follow up with GI, Nov 13. His eye exam has been scheduled with  on Sept 25. Patient reported his sister recently passed away due to complications from a brain tumor. He is understandably sad about her passing. Listened and provided encouragement. Patient denied further needs and was appreciative of the call.     Adult Patient Profile  Questions/Answers      Flowsheet Row Most Recent Value   Symptoms/Conditions Managed at Home gastrointestinal, hematologic   Gastrointestinal Symptoms/Conditions other (see comments)  [Positive Cologard - GI follow up scheduled]   Gastrointestinal Management Strategies nutrition support   Hematologic Symptoms/Conditions iron deficiency anemia   Hematologic Management Strategies medication therapy   Hematologic Self-Management Outcome 1 (very bad)  [Patient has not obtained his iron supplement]   Behavioral Health Symptoms/Conditions substance use   Substance Use tobacco   Behavioral Health Self-Management Outcome 1 (very bad)   Identifying Health Goals stopping or cutting back on smoking   Importance of Change 3   Confidence to Make Change 2   Readiness to Change 2   Missed Doses of Prescribed Medications During Past Week yes          SDOH updated and reviewed with the patient during this program:  Financial Resource Strain: High Risk    Difficulty of Paying Living Expenses: Hard      Food Insecurity: No Food Insecurity     Worried About Running Out of Food in the Last Year: Never true    Ran Out of Food in the Last Year: Never true      Transportation Needs: No Transportation Needs    Lack of Transportation (Medical): No    Lack of Transportation (Non-Medical): No      Stress: Stress Concern Present    Feeling of Stress : Rather much     Jess CAMPBELL  Ambulatory Case Management    9/18/2023, 16:03 EDT

## 2023-09-26 ENCOUNTER — PATIENT OUTREACH (OUTPATIENT)
Dept: CASE MANAGEMENT | Facility: OTHER | Age: 63
End: 2023-09-26
Payer: MEDICARE

## 2023-09-26 DIAGNOSIS — Z72.0 TOBACCO USE: ICD-10-CM

## 2023-09-26 DIAGNOSIS — E11.69 TYPE 2 DIABETES MELLITUS WITH OTHER SPECIFIED COMPLICATION, WITHOUT LONG-TERM CURRENT USE OF INSULIN: ICD-10-CM

## 2023-09-26 DIAGNOSIS — N18.31 STAGE 3A CHRONIC KIDNEY DISEASE: Primary | ICD-10-CM

## 2023-09-26 DIAGNOSIS — M19.90 ARTHRITIS: ICD-10-CM

## 2023-09-26 NOTE — OUTREACH NOTE
"[facility] End of Month Documentation    This Chronic Medical Management Care Plan for Carmelo Saucedo, 62 y.o. male, has been monitored and managed and a new plan of care implemented for the month of September.  A cumulative time of 22 minutes was spent on this patient record this month, including phone call with patient; chart review.    Regarding the patient's problems: has Insulin-requiring or dependent type II diabetes mellitus; Diabetic peripheral neuropathy; Gastroesophageal reflux disease with esophagitis; Hypertension; Hypothyroidism; Low back pain; Congestive cardiomyopathy; Chronic obstructive pulmonary disease; Anxiety disorder; Arthritis; CHF (congestive heart failure); Chronic pain; Chronic pancreatitis; Depression; Osteoarthritis; Schizoaffective disorder, bipolar type; Tobacco use; H/O tobacco use, presenting hazards to health; Migraine without status migrainosus, not intractable; Hyperlipidemia; Balanitis; Insomnia; Smoker; Floppy eyelid syndrome; Floaters; Epiretinal membrane (ERM) of right eye; Dry eyes, bilateral; Cyst, eyelid, left; Combined form of age-related cataract, both eyes; Bilateral hypertensive retinopathy; Stage 3a chronic kidney disease; and Benign prostatic hyperplasia with nocturia on their problem list., the following items were addressed: medical records and any changes can be found within the plan section of the note.  A detailed listing of time spent for chronic care management is tracked within each outreach encounter.  Current medications include:  has a current medication list which includes the following prescription(s): acyclovir, albuterol sulfate hfa, aspirin, atorvastatin, capsaicin, duloxetine, duloxetine, gabapentin, glucose blood, glucose monitor, hydrochlorothiazide, hydrocodone-acetaminophen, levemir flexpen, pen needles 3/16\", combivent respimat, ipratropium-albuterol, levothyroxine, losartan, metformin, metoprolol succinate xl, pantoprazole, " promethazine-dextromethorphan, sildenafil, tamsulosin, trazodone, turmeric, [DISCONTINUED] clonazepam, [DISCONTINUED] freestyle, [DISCONTINUED] nitroglycerin, [DISCONTINUED] topiramate, and [DISCONTINUED] venlafaxine xr. and the patient is reported to be patient is noncompliant with medication protocol,  Medications are reported to be non-effective in controlling symptoms and changes have been made to the medication protocol.  Regarding these diagnoses, referrals were made to the following provider(s):  [facility].  All notes on chart for PCP to review.    The patient was monitored remotely for blood glucose; activity level.    The patient's physical needs include:  dental care; eye care; physical healthcare.     The patient's mental support needs include:  No data recorded    The patient's cognitive support needs include:  continued support    The patient's psychosocial support needs include:  continued support    The patient's functional needs include: dental; eye care; physical healthcare    The patient's environmental needs include:  not applicable    Care Plan overall comments:  No data recorded    Refer to previous outreach notes for more information on the areas listed above.    Monthly Billing Diagnoses  (N18.31) Stage 3a chronic kidney disease    (Z72.0) Tobacco use    (M19.90) Arthritis    (E11.69) Type 2 diabetes mellitus with other specified complication, without long-term current use of insulin    Medications   Medications have been reconciled    Care Plan progress this month:      Recently Modified Care Plans Updates made since 8/26/2023 12:00 AM       Wellness (Adult)           Problem Priority Last Modified     Tobacco Use (Wellness) --  4/24/2023  9:33 AM by Nellie Flannery RN              Goal Recent Progress Last Modified     Tobacco Use Managed --  4/24/2023  9:33 AM by Nellie Flannery RN     Evidence-based guidance:   Identify tobacco use status including cigarette, cigar,  "roll-your-own, dissolvable, hookah or smokeless tobacco, nicotine gels, vapes, e-cigarettes or other electronic delivery systems.   Identify the patient's willingness to quit or cut down.    Intervene using the  €œ5A's\" technique:   Ask: Identify and document tobacco use status for every patient at every visit.   Assess willingness to make a quit attempt now.   Advise: In a clear, strong and personalized manner, urge every tobacco user to quit.   Assist: For the patient willing to make a quit attempt, use counseling and pharmacologic therapy (nicotine replacement, antidepressant, nicotinic receptor partial agonist) to help quit.   Arrange: Schedule follow-up contact, in person or by telephone, preferably within the first week after the quit date.    Notes:            Task Due Date Last Modified     Promote Smoking Cessation --  9/18/2023  4:02 PM by Jess Rosen RN     Care Management Activities:      - barriers to change identified  - stage of change monitored      Notes: 9/18/23                    Diabetes Type 2 (Adult)           Problem Priority Last Modified     Glycemic Management (Diabetes, Type 2) --  4/24/2023  9:33 AM by Nellie Flannery RN              Goal Recent Progress Last Modified     Glycemic Management Optimized --  4/24/2023  9:33 AM by Nellie Flannery RN     Evidence-based guidance:   Anticipate A1C testing (point-of-care) every 3 to 6 months based on goal attainment.   Review mutually-set A1C goal or target range.   Anticipate use of antihyperglycemic with or without insulin and periodic adjustments; consider active involvement of pharmacist.   Provide medical nutrition therapy and development of individualized eating.   Compare self-reported symptoms of hypo or hyperglycemia to blood glucose levels, diet and fluid intake, current medications, psychosocial and physiologic stressors, change in activity and barriers to care adherence.   Promote self-monitoring of blood glucose levels.   Assess " and address barriers to management plan, such as food insecurity, age, developmental ability, depression, anxiety, fear of hypoglycemia or weight gain, as well as medication cost, side effects and complicated regimen.   Consider referral to community-based diabetes education program, visiting nurse, community health worker or health .   Encourage regular dental care for treatment of periodontal disease; refer to dental provider when needed.    Notes:            Task Due Date Last Modified     Alleviate Barriers to Glycemic Management --  9/18/2023  4:02 PM by Jess Rosen RN     Care Management Activities:      - A1C testing facilitated  - blood glucose monitoring encouraged  - blood glucose readings reviewed  - dental care encouraged      Notes: 9/18/23               Problem Priority Last Modified     Disease Progression (Diabetes, Type 2) --  4/24/2023  9:33 AM by Nellie Flannery RN              Goal Recent Progress Last Modified     Disease Progression Prevented or Minimized --  4/24/2023  9:33 AM by Nellie Flannery RN     Evidence-based guidance:   Prepare patient for laboratory and diagnostic exams based on risk and presentation.   Encourage lifestyle changes, such as increased intake of plant-based foods, stress reduction, consistent physical activity and smoking cessation to prevent long-term complications and chronic disease.    Individualize activity and exercise recommendations while considering potential limitations, such as neuropathy, retinopathy or the ability to prevent hyperglycemia or hypoglycemia.    Prepare patient for use of pharmacologic therapy that may include antihypertensive, analgesic, prostaglandin E1 with periodic adjustments, based on presenting chronic condition and laboratory results.   Assess signs/symptoms and risk factors for hypertension, sleep-disordered breathing, neuropathy (including changes in gait and balance), retinopathy, nephropathy and sexual dysfunction.    Address pregnancy planning and contraceptive choice, especially when prescribing antihypertensive or statin.   Ensure completion of annual comprehensive foot exam and dilated eye exam.    Implement additional individualized goals and interventions based on identified risk factors.   Prepare patient for consultation or referral for specialist care, such as ophthalmology, neurology, cardiology, podiatry, nephrology or perinatology.    Notes:            Task Due Date Last Modified     Monitor and Manage Follow-up for Comorbidities --  9/18/2023  4:03 PM by Jess Rosen RN     Care Management Activities:      - response to pharmacologic therapy monitored      Notes: 9/18/23                         Current Specialty Plan of Care Status signed by both patient and provider    Instructions   Patient was provided an electronic copy of care plan  CCM services were explained and offered and patient has accepted these services.  Patient has given their written consent to receive CCM services and understands that this includes the authorization of electronic communication of medical information with the other treating providers.  Patient understands that they may stop CCM services at any time and these changes will be effective at the end of the calendar month and will effectively revocate the agreement of CCM services.  Patient understands that only one practitioner can furnish and be paid for CCM services during one calendar month.  Patient also understands that there may be co-payment or deductible fees in association with CCM services.  Patient will continue with at least monthly follow-up calls with the Ambulatory .    Jses CAMPBELL  Ambulatory Case Management    9/26/2023, 16:39 EDT

## 2023-10-30 RX ORDER — SODIUM, POTASSIUM,MAG SULFATES 17.5-3.13G
2 SOLUTION, RECONSTITUTED, ORAL ORAL TAKE AS DIRECTED
Qty: 354 ML | Refills: 0 | Status: SHIPPED | OUTPATIENT
Start: 2023-10-30

## 2023-11-13 ENCOUNTER — OUTSIDE FACILITY SERVICE (OUTPATIENT)
Dept: GASTROENTEROLOGY | Facility: CLINIC | Age: 63
End: 2023-11-13
Payer: MEDICARE

## 2023-11-13 PROCEDURE — 45385 COLONOSCOPY W/LESION REMOVAL: CPT | Performed by: INTERNAL MEDICINE

## 2023-11-21 DIAGNOSIS — E11.69 TYPE 2 DIABETES MELLITUS WITH OTHER SPECIFIED COMPLICATION, WITHOUT LONG-TERM CURRENT USE OF INSULIN: ICD-10-CM

## 2023-11-21 RX ORDER — PEN NEEDLE, DIABETIC 30 GX5/16"
1 NEEDLE, DISPOSABLE MISCELLANEOUS NIGHTLY
Qty: 90 EACH | Refills: 0 | Status: SHIPPED | OUTPATIENT
Start: 2023-11-21

## 2023-11-21 NOTE — TELEPHONE ENCOUNTER
Caller: Carmelo Saucedo    Relationship: Self    Best call back number: [phone]     Requested Prescriptions:   Requested Prescriptions     Pending Prescriptions Disp Refills    metFORMIN (GLUCOPHAGE) 1000 MG tablet 180 tablet 1     Sig: Take 1 tablet by mouth 2 (Two) Times a Day With Meals.        Pharmacy where request should be sent: [place], [place] - [address] [facility] [phone] [facility] [phone]      Last office visit with prescribing clinician: 9/12/2023   Last telemedicine visit with prescribing clinician: Visit date not found   Next office visit with prescribing clinician: 12/1/2023     Additional details provided by patient: PATIENT HAS CALLED REQUESTING A 90 DAY REFILL ON ABOVE MEDICATION    Does the patient have less than a 3 day supply:  [x] Yes  [] No    Would you like a call back once the refill request has been completed: [] Yes [x] No    If the office needs to give you a call back, can they leave a voicemail: [] Yes [x] No    Anitra Galan   11/21/23 12:22 EST

## 2023-12-18 ENCOUNTER — TELEPHONE (OUTPATIENT)
Dept: CASE MANAGEMENT | Facility: OTHER | Age: 63
End: 2023-12-18
Payer: MEDICARE

## 2023-12-26 ENCOUNTER — TELEPHONE (OUTPATIENT)
Dept: CASE MANAGEMENT | Facility: OTHER | Age: 63
End: 2023-12-26
Payer: MEDICARE

## 2024-01-09 ENCOUNTER — TELEPHONE (OUTPATIENT)
Dept: CASE MANAGEMENT | Facility: OTHER | Age: 64
End: 2024-01-09
Payer: MEDICARE

## 2024-02-13 ENCOUNTER — LAB (OUTPATIENT)
Dept: LAB | Facility: HOSPITAL | Age: 64
End: 2024-02-13
Payer: MEDICARE

## 2024-02-13 ENCOUNTER — OFFICE VISIT (OUTPATIENT)
Dept: INTERNAL MEDICINE | Facility: CLINIC | Age: 64
End: 2024-02-13
Payer: MEDICARE

## 2024-02-13 ENCOUNTER — PATIENT OUTREACH (OUTPATIENT)
Dept: CASE MANAGEMENT | Facility: OTHER | Age: 64
End: 2024-02-13
Payer: MEDICARE

## 2024-02-13 VITALS
HEIGHT: 73 IN | WEIGHT: 209 LBS | BODY MASS INDEX: 27.7 KG/M2 | DIASTOLIC BLOOD PRESSURE: 64 MMHG | HEART RATE: 76 BPM | TEMPERATURE: 97.9 F | OXYGEN SATURATION: 96 % | SYSTOLIC BLOOD PRESSURE: 118 MMHG

## 2024-02-13 DIAGNOSIS — Z79.899 ON STATIN THERAPY: ICD-10-CM

## 2024-02-13 DIAGNOSIS — N41.1 CHRONIC PROSTATITIS: ICD-10-CM

## 2024-02-13 DIAGNOSIS — Z79.4 TYPE 2 DIABETES MELLITUS WITH OTHER SPECIFIED COMPLICATION, WITH LONG-TERM CURRENT USE OF INSULIN: ICD-10-CM

## 2024-02-13 DIAGNOSIS — G47.00 INSOMNIA, UNSPECIFIED TYPE: ICD-10-CM

## 2024-02-13 DIAGNOSIS — Z72.0 DECLINED SMOKING CESSATION: ICD-10-CM

## 2024-02-13 DIAGNOSIS — E66.3 OVERWEIGHT (BMI 25.0-29.9): ICD-10-CM

## 2024-02-13 DIAGNOSIS — Z79.82 LONG-TERM USE OF ASPIRIN THERAPY: ICD-10-CM

## 2024-02-13 DIAGNOSIS — N40.1 BENIGN PROSTATIC HYPERPLASIA WITH NOCTURIA: ICD-10-CM

## 2024-02-13 DIAGNOSIS — R35.1 BENIGN PROSTATIC HYPERPLASIA WITH NOCTURIA: ICD-10-CM

## 2024-02-13 DIAGNOSIS — Z79.4 INSULIN-REQUIRING OR DEPENDENT TYPE II DIABETES MELLITUS: ICD-10-CM

## 2024-02-13 DIAGNOSIS — Z72.0 TOBACCO USE: ICD-10-CM

## 2024-02-13 DIAGNOSIS — Z79.899 LONG-TERM USE OF HIGH-RISK MEDICATION: ICD-10-CM

## 2024-02-13 DIAGNOSIS — E11.69 TYPE 2 DIABETES MELLITUS WITH OTHER SPECIFIED COMPLICATION, WITHOUT LONG-TERM CURRENT USE OF INSULIN: ICD-10-CM

## 2024-02-13 DIAGNOSIS — K63.5 POLYP OF COLON, UNSPECIFIED PART OF COLON, UNSPECIFIED TYPE: ICD-10-CM

## 2024-02-13 DIAGNOSIS — K57.90 DIVERTICULOSIS: ICD-10-CM

## 2024-02-13 DIAGNOSIS — J44.9 CHRONIC OBSTRUCTIVE PULMONARY DISEASE, UNSPECIFIED COPD TYPE: ICD-10-CM

## 2024-02-13 DIAGNOSIS — M19.90 ARTHRITIS: ICD-10-CM

## 2024-02-13 DIAGNOSIS — E03.9 ACQUIRED HYPOTHYROIDISM: ICD-10-CM

## 2024-02-13 DIAGNOSIS — Z76.89 ESTABLISHING CARE WITH NEW DOCTOR, ENCOUNTER FOR: Primary | ICD-10-CM

## 2024-02-13 DIAGNOSIS — E78.2 MIXED HYPERLIPIDEMIA: ICD-10-CM

## 2024-02-13 DIAGNOSIS — Z71.3 ENCOUNTER FOR DIETARY COUNSELING AND SURVEILLANCE: ICD-10-CM

## 2024-02-13 DIAGNOSIS — E11.9 INSULIN-REQUIRING OR DEPENDENT TYPE II DIABETES MELLITUS: ICD-10-CM

## 2024-02-13 DIAGNOSIS — Z28.21 IMMUNIZATION DECLINED: ICD-10-CM

## 2024-02-13 DIAGNOSIS — E11.69 TYPE 2 DIABETES MELLITUS WITH OTHER SPECIFIED COMPLICATION, WITHOUT LONG-TERM CURRENT USE OF INSULIN: Primary | ICD-10-CM

## 2024-02-13 DIAGNOSIS — F17.200 SMOKER: ICD-10-CM

## 2024-02-13 DIAGNOSIS — E11.69 TYPE 2 DIABETES MELLITUS WITH OTHER SPECIFIED COMPLICATION, WITH LONG-TERM CURRENT USE OF INSULIN: ICD-10-CM

## 2024-02-13 DIAGNOSIS — K21.9 GASTROESOPHAGEAL REFLUX DISEASE, UNSPECIFIED WHETHER ESOPHAGITIS PRESENT: ICD-10-CM

## 2024-02-13 DIAGNOSIS — I10 ESSENTIAL HYPERTENSION: ICD-10-CM

## 2024-02-13 DIAGNOSIS — G89.4 CHRONIC PAIN SYNDROME: ICD-10-CM

## 2024-02-13 DIAGNOSIS — E11.42 DIABETIC PERIPHERAL NEUROPATHY: ICD-10-CM

## 2024-02-13 DIAGNOSIS — I10 HYPERTENSION, UNSPECIFIED TYPE: ICD-10-CM

## 2024-02-13 PROBLEM — G43.909 MIGRAINE WITHOUT STATUS MIGRAINOSUS, NOT INTRACTABLE: Status: RESOLVED | Noted: 2017-05-02 | Resolved: 2024-02-13

## 2024-02-13 PROBLEM — H02.59 FLOPPY EYELID SYNDROME: Status: RESOLVED | Noted: 2018-01-11 | Resolved: 2024-02-13

## 2024-02-13 PROBLEM — Z87.891 H/O TOBACCO USE, PRESENTING HAZARDS TO HEALTH: Status: RESOLVED | Noted: 2017-05-02 | Resolved: 2024-02-13

## 2024-02-13 LAB
ALBUMIN UR-MCNC: <1.2 MG/DL
BILIRUB UR QL STRIP: NEGATIVE
CLARITY UR: CLEAR
COLOR UR: YELLOW
DEPRECATED RDW RBC AUTO: 43.8 FL (ref 37–54)
ERYTHROCYTE [DISTWIDTH] IN BLOOD BY AUTOMATED COUNT: 12.7 % (ref 12.3–15.4)
GLUCOSE UR STRIP-MCNC: NEGATIVE MG/DL
HBA1C MFR BLD: 6.7 % (ref 4.8–5.6)
HCT VFR BLD AUTO: 47.7 % (ref 37.5–51)
HGB BLD-MCNC: 16 G/DL (ref 13–17.7)
HGB UR QL STRIP.AUTO: NEGATIVE
HOLD SPECIMEN: NORMAL
KETONES UR QL STRIP: NEGATIVE
LEUKOCYTE ESTERASE UR QL STRIP.AUTO: NEGATIVE
MCH RBC QN AUTO: 31.8 PG (ref 26.6–33)
MCHC RBC AUTO-ENTMCNC: 33.5 G/DL (ref 31.5–35.7)
MCV RBC AUTO: 94.8 FL (ref 79–97)
NITRITE UR QL STRIP: NEGATIVE
PH UR STRIP.AUTO: 6 [PH] (ref 5–8)
PLATELET # BLD AUTO: 276 10*3/MM3 (ref 140–450)
PMV BLD AUTO: 11.8 FL (ref 6–12)
PROT UR QL STRIP: NEGATIVE
RBC # BLD AUTO: 5.03 10*6/MM3 (ref 4.14–5.8)
SP GR UR STRIP: 1.01 (ref 1–1.03)
UROBILINOGEN UR QL STRIP: NORMAL
WBC NRBC COR # BLD AUTO: 10.38 10*3/MM3 (ref 3.4–10.8)

## 2024-02-13 PROCEDURE — 82043 UR ALBUMIN QUANTITATIVE: CPT

## 2024-02-13 PROCEDURE — 83036 HEMOGLOBIN GLYCOSYLATED A1C: CPT

## 2024-02-13 PROCEDURE — 84443 ASSAY THYROID STIM HORMONE: CPT

## 2024-02-13 PROCEDURE — 85027 COMPLETE CBC AUTOMATED: CPT

## 2024-02-13 PROCEDURE — 80053 COMPREHEN METABOLIC PANEL: CPT

## 2024-02-13 PROCEDURE — 80061 LIPID PANEL: CPT

## 2024-02-13 PROCEDURE — 81003 URINALYSIS AUTO W/O SCOPE: CPT

## 2024-02-13 RX ORDER — LOSARTAN POTASSIUM 25 MG/1
25 TABLET ORAL DAILY
Qty: 90 TABLET | Refills: 0 | Status: SHIPPED | OUTPATIENT
Start: 2024-02-13

## 2024-02-13 RX ORDER — HYDROCHLOROTHIAZIDE 12.5 MG/1
12.5 CAPSULE, GELATIN COATED ORAL DAILY
Qty: 90 CAPSULE | Refills: 0 | Status: SHIPPED | OUTPATIENT
Start: 2024-02-13

## 2024-02-13 RX ORDER — LEVOTHYROXINE SODIUM 88 UG/1
88 TABLET ORAL
Qty: 90 TABLET | Refills: 0 | Status: SHIPPED | OUTPATIENT
Start: 2024-02-13

## 2024-02-13 RX ORDER — TRAZODONE HYDROCHLORIDE 100 MG/1
50 TABLET ORAL
Qty: 90 TABLET | Refills: 0 | Status: CANCELLED | OUTPATIENT
Start: 2024-02-13

## 2024-02-13 RX ORDER — TAMSULOSIN HYDROCHLORIDE 0.4 MG/1
2 CAPSULE ORAL
Qty: 180 CAPSULE | Refills: 0 | Status: SHIPPED | OUTPATIENT
Start: 2024-02-13

## 2024-02-13 RX ORDER — METOPROLOL SUCCINATE 50 MG/1
50 TABLET, EXTENDED RELEASE ORAL DAILY
Qty: 90 TABLET | Refills: 0 | Status: SHIPPED | OUTPATIENT
Start: 2024-02-13

## 2024-02-13 RX ORDER — ASPIRIN 81 MG/1
81 TABLET, CHEWABLE ORAL DAILY
Qty: 90 TABLET | Refills: 0 | Status: SHIPPED | OUTPATIENT
Start: 2024-02-13

## 2024-02-13 RX ORDER — TRAZODONE HYDROCHLORIDE 50 MG/1
50 TABLET ORAL NIGHTLY
Qty: 90 TABLET | Refills: 0 | Status: SHIPPED | OUTPATIENT
Start: 2024-02-13

## 2024-02-13 RX ORDER — ATORVASTATIN CALCIUM 20 MG/1
20 TABLET, FILM COATED ORAL
Qty: 90 TABLET | Refills: 0 | Status: SHIPPED | OUTPATIENT
Start: 2024-02-13

## 2024-02-13 RX ORDER — PANTOPRAZOLE SODIUM 40 MG/1
40 TABLET, DELAYED RELEASE ORAL DAILY
Qty: 90 TABLET | Refills: 0 | Status: SHIPPED | OUTPATIENT
Start: 2024-02-13

## 2024-02-14 ENCOUNTER — TELEPHONE (OUTPATIENT)
Dept: INTERNAL MEDICINE | Facility: CLINIC | Age: 64
End: 2024-02-14
Payer: MEDICARE

## 2024-02-14 ENCOUNTER — TELEPHONE (OUTPATIENT)
Dept: INTERNAL MEDICINE | Facility: CLINIC | Age: 64
End: 2024-02-14

## 2024-02-14 LAB
ALBUMIN SERPL-MCNC: 4.4 G/DL (ref 3.5–5.2)
ALBUMIN/GLOB SERPL: 1.5 G/DL
ALP SERPL-CCNC: 81 U/L (ref 39–117)
ALT SERPL W P-5'-P-CCNC: 16 U/L (ref 1–41)
ANION GAP SERPL CALCULATED.3IONS-SCNC: 12.3 MMOL/L (ref 5–15)
AST SERPL-CCNC: 17 U/L (ref 1–40)
BILIRUB SERPL-MCNC: 0.3 MG/DL (ref 0–1.2)
BUN SERPL-MCNC: 18 MG/DL (ref 8–23)
BUN/CREAT SERPL: 12.3 (ref 7–25)
CALCIUM SPEC-SCNC: 9.8 MG/DL (ref 8.6–10.5)
CHLORIDE SERPL-SCNC: 103 MMOL/L (ref 98–107)
CHOLEST SERPL-MCNC: 137 MG/DL (ref 0–200)
CO2 SERPL-SCNC: 24.7 MMOL/L (ref 22–29)
CREAT SERPL-MCNC: 1.46 MG/DL (ref 0.76–1.27)
EGFRCR SERPLBLD CKD-EPI 2021: 53 ML/MIN/1.73
GLOBULIN UR ELPH-MCNC: 3 GM/DL
GLUCOSE SERPL-MCNC: 120 MG/DL (ref 65–99)
HDLC SERPL-MCNC: 29 MG/DL (ref 40–60)
LDLC SERPL CALC-MCNC: 82 MG/DL (ref 0–100)
LDLC/HDLC SERPL: 2.7 {RATIO}
POTASSIUM SERPL-SCNC: 4.1 MMOL/L (ref 3.5–5.2)
PROT SERPL-MCNC: 7.4 G/DL (ref 6–8.5)
SODIUM SERPL-SCNC: 140 MMOL/L (ref 136–145)
TRIGL SERPL-MCNC: 149 MG/DL (ref 0–150)
TSH SERPL DL<=0.05 MIU/L-ACNC: 1.79 UIU/ML (ref 0.27–4.2)
VLDLC SERPL-MCNC: 26 MG/DL (ref 5–40)

## 2024-02-14 NOTE — TELEPHONE ENCOUNTER
Name: LewisCarmelo hooper John Paul    Relationship: Self    Best Callback Number: [phone]    HUB PROVIDED THE RELAY MESSAGE FROM THE OFFICE   PATIENT HAS FURTHER QUESTIONS AND WOULD LIKE A CALL BACK AT THE FOLLOWING PHONE NUMBER [phone]    ADDITIONAL INFORMATION:

## 2024-03-18 DIAGNOSIS — E11.69 TYPE 2 DIABETES MELLITUS WITH OTHER SPECIFIED COMPLICATION, WITHOUT LONG-TERM CURRENT USE OF INSULIN: ICD-10-CM

## 2024-03-18 RX ORDER — PEN NEEDLE, DIABETIC 30 GX5/16"
1 NEEDLE, DISPOSABLE MISCELLANEOUS NIGHTLY
Qty: 90 EACH | Refills: 0 | Status: SHIPPED | OUTPATIENT
Start: 2024-03-18

## 2024-03-18 NOTE — TELEPHONE ENCOUNTER
"  Caller: Carmelo Saucedo    Relationship: Self    Best call back number:     Requested Prescriptions:   Requested Prescriptions     Pending Prescriptions Disp Refills    Insulin Pen Needle (Pen Needles 3/16\") 31G X 5 MM misc 90 each 0     Sig: Use 1 each Every Night.    100 COUNT    Pharmacy where request should be sent: [address] [phone] [facility] [phone] [facility]     Last office visit with prescribing clinician: 2/13/2024   Last telemedicine visit with prescribing clinician: Visit date not found   Next office visit with prescribing clinician: 5/21/2024     Additional details provided by patient:     Does the patient have less than a 3 day supply:  [] Yes  [x] No    Would you like a call back once the refill request has been completed: [] Yes [x] No    If the office needs to give you a call back, can they leave a voicemail: [] Yes [x] No    Manjeet Cook Rep   03/18/24 09:17 EDT         "

## 2024-04-08 ENCOUNTER — TELEPHONE (OUTPATIENT)
Dept: INTERNAL MEDICINE | Facility: CLINIC | Age: 64
End: 2024-04-08
Payer: MEDICARE

## 2024-04-08 DIAGNOSIS — J44.1 CHRONIC OBSTRUCTIVE PULMONARY DISEASE WITH ACUTE EXACERBATION: ICD-10-CM

## 2024-04-08 RX ORDER — IPRATROPIUM BROMIDE AND ALBUTEROL 20; 100 UG/1; UG/1
1 SPRAY, METERED RESPIRATORY (INHALATION) 4 TIMES DAILY
Qty: 4 G | Refills: 0 | Status: SHIPPED | OUTPATIENT
Start: 2024-04-08

## 2024-04-08 NOTE — TELEPHONE ENCOUNTER
Pt requests refill on Combivent AER Respimat  Qty 12    Last filled 12/30/23    Inhale 1 puff by mouth 4x daily     LOV: 2/13/24  NOV: 4/29/24

## 2024-04-29 ENCOUNTER — OFFICE VISIT (OUTPATIENT)
Dept: INTERNAL MEDICINE | Facility: CLINIC | Age: 64
End: 2024-04-29
Payer: MEDICARE

## 2024-04-29 VITALS
TEMPERATURE: 97.6 F | DIASTOLIC BLOOD PRESSURE: 74 MMHG | WEIGHT: 219.6 LBS | BODY MASS INDEX: 29.1 KG/M2 | OXYGEN SATURATION: 97 % | HEIGHT: 73 IN | HEART RATE: 66 BPM | RESPIRATION RATE: 18 BRPM | SYSTOLIC BLOOD PRESSURE: 118 MMHG

## 2024-04-29 DIAGNOSIS — G47.00 INSOMNIA, UNSPECIFIED TYPE: ICD-10-CM

## 2024-04-29 DIAGNOSIS — E11.9 INSULIN-REQUIRING OR DEPENDENT TYPE II DIABETES MELLITUS: ICD-10-CM

## 2024-04-29 DIAGNOSIS — G89.4 CHRONIC PAIN SYNDROME: ICD-10-CM

## 2024-04-29 DIAGNOSIS — N40.1 BENIGN PROSTATIC HYPERPLASIA WITH NOCTURIA: ICD-10-CM

## 2024-04-29 DIAGNOSIS — R35.1 BENIGN PROSTATIC HYPERPLASIA WITH NOCTURIA: ICD-10-CM

## 2024-04-29 DIAGNOSIS — K57.90 DIVERTICULOSIS: ICD-10-CM

## 2024-04-29 DIAGNOSIS — Z79.899 LONG-TERM USE OF HIGH-RISK MEDICATION: ICD-10-CM

## 2024-04-29 DIAGNOSIS — Z09 ENCOUNTER FOR FOLLOW-UP: Primary | ICD-10-CM

## 2024-04-29 DIAGNOSIS — Z87.891 PERSONAL HISTORY OF NICOTINE DEPENDENCE: ICD-10-CM

## 2024-04-29 DIAGNOSIS — K63.5 POLYP OF COLON, UNSPECIFIED PART OF COLON, UNSPECIFIED TYPE: ICD-10-CM

## 2024-04-29 DIAGNOSIS — E11.42 DIABETIC PERIPHERAL NEUROPATHY: ICD-10-CM

## 2024-04-29 DIAGNOSIS — J44.1 CHRONIC OBSTRUCTIVE PULMONARY DISEASE WITH ACUTE EXACERBATION: ICD-10-CM

## 2024-04-29 DIAGNOSIS — K21.9 GASTROESOPHAGEAL REFLUX DISEASE, UNSPECIFIED WHETHER ESOPHAGITIS PRESENT: ICD-10-CM

## 2024-04-29 DIAGNOSIS — Z72.0 TOBACCO USE: ICD-10-CM

## 2024-04-29 DIAGNOSIS — F17.200 SMOKER: ICD-10-CM

## 2024-04-29 DIAGNOSIS — Z79.899 ON STATIN THERAPY: ICD-10-CM

## 2024-04-29 DIAGNOSIS — N41.1 CHRONIC PROSTATITIS: ICD-10-CM

## 2024-04-29 DIAGNOSIS — Z72.0 DECLINED SMOKING CESSATION: ICD-10-CM

## 2024-04-29 DIAGNOSIS — J44.9 CHRONIC OBSTRUCTIVE PULMONARY DISEASE, UNSPECIFIED COPD TYPE: ICD-10-CM

## 2024-04-29 DIAGNOSIS — E11.9 ENCOUNTER FOR DIABETIC FOOT EXAM: ICD-10-CM

## 2024-04-29 DIAGNOSIS — E11.69 TYPE 2 DIABETES MELLITUS WITH OTHER SPECIFIED COMPLICATION, WITH LONG-TERM CURRENT USE OF INSULIN: ICD-10-CM

## 2024-04-29 DIAGNOSIS — Z79.82 LONG-TERM USE OF ASPIRIN THERAPY: ICD-10-CM

## 2024-04-29 DIAGNOSIS — I10 ESSENTIAL HYPERTENSION: ICD-10-CM

## 2024-04-29 DIAGNOSIS — E78.2 MIXED HYPERLIPIDEMIA: ICD-10-CM

## 2024-04-29 DIAGNOSIS — E03.9 ACQUIRED HYPOTHYROIDISM: ICD-10-CM

## 2024-04-29 DIAGNOSIS — Z79.4 INSULIN-REQUIRING OR DEPENDENT TYPE II DIABETES MELLITUS: ICD-10-CM

## 2024-04-29 DIAGNOSIS — Z79.4 TYPE 2 DIABETES MELLITUS WITH OTHER SPECIFIED COMPLICATION, WITH LONG-TERM CURRENT USE OF INSULIN: ICD-10-CM

## 2024-04-29 PROCEDURE — 99214 OFFICE O/P EST MOD 30 MIN: CPT | Performed by: NURSE PRACTITIONER

## 2024-04-29 PROCEDURE — 3074F SYST BP LT 130 MM HG: CPT | Performed by: NURSE PRACTITIONER

## 2024-04-29 PROCEDURE — 1159F MED LIST DOCD IN RCRD: CPT | Performed by: NURSE PRACTITIONER

## 2024-04-29 PROCEDURE — 3044F HG A1C LEVEL LT 7.0%: CPT | Performed by: NURSE PRACTITIONER

## 2024-04-29 PROCEDURE — 3078F DIAST BP <80 MM HG: CPT | Performed by: NURSE PRACTITIONER

## 2024-04-29 PROCEDURE — 1160F RVW MEDS BY RX/DR IN RCRD: CPT | Performed by: NURSE PRACTITIONER

## 2024-04-29 RX ORDER — ATORVASTATIN CALCIUM 20 MG/1
20 TABLET, FILM COATED ORAL
Qty: 90 TABLET | Refills: 1 | Status: SHIPPED | OUTPATIENT
Start: 2024-04-29

## 2024-04-29 RX ORDER — ASPIRIN 81 MG/1
81 TABLET, CHEWABLE ORAL DAILY
Qty: 90 TABLET | Refills: 1 | Status: SHIPPED | OUTPATIENT
Start: 2024-04-29

## 2024-04-29 RX ORDER — METOPROLOL SUCCINATE 50 MG/1
50 TABLET, EXTENDED RELEASE ORAL DAILY
Qty: 90 TABLET | Refills: 1 | Status: SHIPPED | OUTPATIENT
Start: 2024-04-29

## 2024-04-29 RX ORDER — HYDROCHLOROTHIAZIDE 12.5 MG/1
12.5 CAPSULE, GELATIN COATED ORAL DAILY
Qty: 90 CAPSULE | Refills: 1 | Status: SHIPPED | OUTPATIENT
Start: 2024-04-29

## 2024-04-29 RX ORDER — TAMSULOSIN HYDROCHLORIDE 0.4 MG/1
2 CAPSULE ORAL
Qty: 180 CAPSULE | Refills: 1 | Status: SHIPPED | OUTPATIENT
Start: 2024-04-29

## 2024-04-29 RX ORDER — TRAZODONE HYDROCHLORIDE 50 MG/1
50 TABLET ORAL NIGHTLY
Qty: 90 TABLET | Refills: 1 | Status: SHIPPED | OUTPATIENT
Start: 2024-04-29

## 2024-04-29 RX ORDER — IPRATROPIUM BROMIDE AND ALBUTEROL 20; 100 UG/1; UG/1
1 SPRAY, METERED RESPIRATORY (INHALATION) 4 TIMES DAILY
Qty: 4 G | Refills: 3 | Status: SHIPPED | OUTPATIENT
Start: 2024-04-29

## 2024-04-29 RX ORDER — LEVOTHYROXINE SODIUM 88 UG/1
88 TABLET ORAL
Qty: 90 TABLET | Refills: 1 | Status: SHIPPED | OUTPATIENT
Start: 2024-04-29

## 2024-04-29 RX ORDER — LOSARTAN POTASSIUM 25 MG/1
25 TABLET ORAL DAILY
Qty: 90 TABLET | Refills: 1 | Status: SHIPPED | OUTPATIENT
Start: 2024-04-29

## 2024-04-29 RX ORDER — PANTOPRAZOLE SODIUM 40 MG/1
40 TABLET, DELAYED RELEASE ORAL DAILY
Qty: 90 TABLET | Refills: 1 | Status: SHIPPED | OUTPATIENT
Start: 2024-04-29

## 2024-04-29 NOTE — PROGRESS NOTES
Office Note     Name: Carmelo Saucedo    : 1960     MRN: [id number]     Chief Complaint  3m followup    Subjective     History of Present Illness:  Carmelo Saucedo is a 63 y.o. male who presents today for follow-up on chronic conditions    Patient was last seen 2024 to establish care with a new provider in our office.  At that visit, patient had declined all vaccines.  Last tetanus shot was     Hyperlipidemia: Patient is currently taking Lipitor 20 mg once nightly.  He is also taking aspirin.  He states he only takes this occasionally.    Hypertension: Patient is currently on hydrochlorothiazide 12.5 mg, losartan 25 mg, metoprolol 50 mg.  He has seen cardiology in the past but no longer sees them as it was too expensive.  He states he had nuclear stress test but that was all he could tell me.  He does not monitor his blood pressure at home  -Blood pressure in office today is 118/74.  -Patient states he has had some intermittent chest pain.  He cannot remember the last time he saw cardiology.  Patient stated he wanted to mention these to me but he does not want any further imaging or testing due to financial expense.    Hypothyroidism: Patient is currently taking Synthroid 88 mcg.  He has not had his thyroid removed    Diabetes: Patient does only occasionally monitor his sugars.  This is due to the expense of the supplies.  He does describe that his fingertips will be sore after he monitors his blood sugar.  Last vision exam was 2023.  He does see  for his vision exams.  He is currently on insulin and metformin.  He does get some burning sensation in the feet.  Patient does wear shoes when he is outside.  He occasionally monitors his feet for a foot check  -Blood sugar this morning was 150  -Patient states he was previously on Lantus and did very well with this type of insulin.  He is currently on Levemir and states that this medication makes him feel worse.  He is very frustrated  that insurance no longer will cover his Lantus.  He wants to see if Lantus can be sent to the pharmacy to see if it will be covered.    GERD: Patient is currently on Protonix    Insomnia: Patient is currently on trazodone.    BPH/chronic prostatitis: Patient is currently on Flomax and tolerating medication well    COPD: Patient uses albuterol as needed, DuoNebs, Combivent inhaler.  No changes to cough, wheezing, sputum production.  No recent exacerbations.  He is a current smoker with a history of a pack per day for 40 years.  Not interested in quitting.    Patient does see pain management Dr. Stefan Elena.  He is on gabapentin as well as hydrocodone.  He does have chronic pain with arthritis in bilateral knees.  He has had 13 surgeries on his knees.  He does get burning sensation in his feet.  Gabapentin does help with this.    Prostate screening completed August 2023 with a result of 3.670  Lung cancer screening was negative and repeat yearly August 2024  Colonoscopy completed November 2023 with polyps and diverticulosis with a 3-year repeat  Last wellness exam September 2023  Last labs were obtained in August     Patient states he is no longer on the Cymbalta.  He states his mood has been well-controlled.  It looks like on his chart there is a history of anxiety, depression, schizoaffective bipolar type.  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  Patient ideally does not want to be on any medications for his mood     Current Specialist include PCP, dermatology, pain.  Patient states he no longer sees cardiology        Past Medical History:   Diagnosis Date    Abnormal EKG     Angular cheilitis     Arthritis     Benign prostatic hypertrophy     Bipolar disorder     Chest pain     Chronic pain     COPD (chronic obstructive pulmonary disease)     Enlarged prostate without lower urinary tract symptoms (luts)     Esophageal reflux     Floppy eyelid syndrome      H/O tobacco use, presenting hazards to health     Hematochezia     Herpes simplex infection     Hyperlipidemia     Hypertension     Insomnia     Lower back pain     Migraine without status migrainosus, not intractable     Nephrolithiasis     Peptic ulcer     Pulmonary nodule     Rectal polyp     Rectal polyp     Sialoadenitis     Skin ulcer of neck     Uncontrolled type 2 diabetes mellitus with complication        Past Surgical History:   Procedure Laterality Date    ADENOIDECTOMY      CHOLECYSTECTOMY      GALLBLADDER SURGERY      KNEE SURGERY Bilateral     release x5    SKIN CANCER DESTRUCTION      TONSILLECTOMY         Social History     Socioeconomic History    Marital status:    Tobacco Use    Smoking status: Every Day     Current packs/day: 2.00     Average packs/day: 2.0 packs/day for 50.0 years (100.0 ttl pk-yrs)     Types: Cigarettes    Smokeless tobacco: Never   Vaping Use    Vaping status: Never Used   Substance and Sexual Activity    Alcohol use: No    Drug use: No    Sexual activity: Defer         Current Outpatient Medications:     acyclovir (ZOVIRAX) 400 MG tablet, Take 1 tablet by mouth 2 (Two) Times a Day. Take no more than 5 doses a day., Disp: 180 tablet, Rfl: 3    albuterol sulfate  (90 Base) MCG/ACT inhaler, Inhale 2 puffs Every 4 (Four) Hours As Needed for Wheezing or Shortness of Air., Disp: 18 g, Rfl: 3    aspirin 81 MG chewable tablet, Chew 1 tablet Daily., Disp: 90 tablet, Rfl: 1    atorvastatin (LIPITOR) 20 MG tablet, Take 1 tablet by mouth every night at bedtime., Disp: 90 tablet, Rfl: 1    Capsaicin 0.075 % stick, Apply 1 application  topically 3 (Three) Times a Day., Disp: 1 g, Rfl: 5    gabapentin (NEURONTIN) 600 MG tablet, Take 1 tablet by mouth Every 6 (Six) Hours., Disp: , Rfl:     glucose blood test strip, Use as instructed to check blood sugar three times daily, Disp: 200 each, Rfl: 6    glucose monitor monitoring kit, 1 each Daily. Patient requests One Touch Glucose  "Meter.  Please substitute covered meter per insurance if needed., Disp: 1 each, Rfl: 0    hydroCHLOROthiazide (MICROZIDE) 12.5 MG capsule, Take 1 capsule by mouth Daily., Disp: 90 capsule, Rfl: 1    HYDROcodone-acetaminophen (NORCO) 7.5-325 MG per tablet, Take 1 tablet by mouth 3 (Three) Times a Day., Disp: , Rfl: 0    insulin detemir (Levemir FlexPen) 100 UNIT/ML injection, Inject 10 Units under the skin into the appropriate area as directed Every Night. (Patient taking differently: Inject 8 Units under the skin into the appropriate area as directed Every Night.), Disp: 9 mL, Rfl: 3    Insulin Pen Needle (Pen Needles 3/16\") 31G X 5 MM misc, Use 1 each Every Night., Disp: 90 each, Rfl: 0    ipratropium-albuterol (DUONEB) 0.5-2.5 mg/3 ml nebulizer, Take 3 mL by nebulization Every 6 (Six) Hours., Disp: 120 vial, Rfl: 3    losartan (COZAAR) 25 MG tablet, Take 1 tablet by mouth Daily. Replaces lisinopril, Disp: 90 tablet, Rfl: 1    metoprolol succinate XL (TOPROL-XL) 50 MG 24 hr tablet, Take 1 tablet by mouth Daily., Disp: 90 tablet, Rfl: 1    sildenafil (REVATIO) 20 MG tablet, TAKE TWO TO THREE TABLETS BY MOUTH ONE HOUR PRIOR TO INTERCOURSE, Disp: 10 tablet, Rfl: 0    Insulin Glargine (LANTUS SOLOSTAR) 100 UNIT/ML injection pen, Inject 10 Units under the skin into the appropriate area as directed Every Night., Disp: 15 mL, Rfl: 0    ipratropium-albuterol (Combivent Respimat)  MCG/ACT inhaler, Inhale 1 puff 4 (Four) Times a Day., Disp: 4 g, Rfl: 3    levothyroxine (SYNTHROID, LEVOTHROID) 88 MCG tablet, Take 1 tablet by mouth Every Morning Before Breakfast., Disp: 90 tablet, Rfl: 1    metFORMIN (GLUCOPHAGE) 1000 MG tablet, Take 1 tablet by mouth 2 (Two) Times a Day With Meals., Disp: 180 tablet, Rfl: 1    pantoprazole (PROTONIX) 40 MG EC tablet, Take 1 tablet by mouth Daily., Disp: 90 tablet, Rfl: 1    tamsulosin (FLOMAX) 0.4 MG capsule 24 hr capsule, Take 2 capsules by mouth every night at bedtime., Disp: 180 " "capsule, Rfl: 1    traZODone (DESYREL) 50 MG tablet, Take 1 tablet by mouth Every Night., Disp: 90 tablet, Rfl: 1    Objective     Vital Signs  /74   Pulse 66   Temp 97.6 °F (36.4 °C) (Temporal)   Resp 18   Ht 185.4 cm (72.99\")   Wt 99.6 kg (219 lb 9.6 oz)   SpO2 97%   BMI 28.98 kg/m²   Estimated body mass index is 28.98 kg/m² as calculated from the following:    Height as of this encounter: 185.4 cm (72.99\").    Weight as of this encounter: 99.6 kg (219 lb 9.6 oz).               Physical Exam  Vitals and nursing note reviewed.   Constitutional:       General: He is awake.      Appearance: Normal appearance. He is well-groomed.   HENT:      Head: Normocephalic and atraumatic.      Right Ear: Hearing and external ear normal.      Left Ear: Hearing and external ear normal.      Nose: Nose normal.      Mouth/Throat:      Lips: Pink.      Mouth: Mucous membranes are moist.   Eyes:      Extraocular Movements: Extraocular movements intact.      Pupils: Pupils are equal, round, and reactive to light.   Cardiovascular:      Rate and Rhythm: Normal rate and regular rhythm.      Pulses: Normal pulses.           Radial pulses are 2+ on the right side and 2+ on the left side.        Dorsalis pedis pulses are 2+ on the right side and 2+ on the left side.        Posterior tibial pulses are 2+ on the right side and 2+ on the left side.      Heart sounds: Normal heart sounds, S1 normal and S2 normal.   Pulmonary:      Effort: Pulmonary effort is normal.      Breath sounds: Normal breath sounds.   Abdominal:      General: Bowel sounds are normal.      Palpations: Abdomen is soft.   Musculoskeletal:         General: Normal range of motion.      Right lower leg: No edema.      Left lower leg: No edema.      Right foot: Normal range of motion. No deformity.      Left foot: Normal range of motion. No deformity.   Feet:      Right foot:      Protective Sensation: 10 sites tested.  10 sites sensed.      Skin integrity: Skin " integrity normal. No skin breakdown.      Toenail Condition: Right toenails are normal.      Left foot:      Protective Sensation: 10 sites tested.  10 sites sensed.      Skin integrity: Skin integrity normal. No skin breakdown.      Toenail Condition: Left toenails are normal.      Comments: Diabetic foot exam bilaterally performed today  Monofilament test bilaterally performed today  Proprioception normal bilaterally  Skin:     General: Skin is warm and dry.   Neurological:      Mental Status: He is alert and oriented to person, place, and time.      Comments: Mental status fully intact as patient was able to provide a detailed description of the events   Psychiatric:         Mood and Affect: Mood normal.         Behavior: Behavior normal. Behavior is cooperative.         Thought Content: Thought content normal.         Judgment: Judgment normal.               Assessment and Plan     Diagnoses and all orders for this visit:    1. Encounter for follow-up (Primary)    2. Mixed hyperlipidemia  -     aspirin 81 MG chewable tablet; Chew 1 tablet Daily.  Dispense: 90 tablet; Refill: 1  -     atorvastatin (LIPITOR) 20 MG tablet; Take 1 tablet by mouth every night at bedtime.  Dispense: 90 tablet; Refill: 1    3. On statin therapy    4. Long-term use of aspirin therapy  -     aspirin 81 MG chewable tablet; Chew 1 tablet Daily.  Dispense: 90 tablet; Refill: 1    5. Essential hypertension  -     hydroCHLOROthiazide (MICROZIDE) 12.5 MG capsule; Take 1 capsule by mouth Daily.  Dispense: 90 capsule; Refill: 1  -     losartan (COZAAR) 25 MG tablet; Take 1 tablet by mouth Daily. Replaces lisinopril  Dispense: 90 tablet; Refill: 1  -     metoprolol succinate XL (TOPROL-XL) 50 MG 24 hr tablet; Take 1 tablet by mouth Daily.  Dispense: 90 tablet; Refill: 1    6. Acquired hypothyroidism  -     levothyroxine (SYNTHROID, LEVOTHROID) 88 MCG tablet; Take 1 tablet by mouth Every Morning Before Breakfast.  Dispense: 90 tablet; Refill:  1    7. Type 2 diabetes mellitus with other specified complication, with long-term current use of insulin  -     Insulin Glargine (LANTUS SOLOSTAR) 100 UNIT/ML injection pen; Inject 10 Units under the skin into the appropriate area as directed Every Night.  Dispense: 15 mL; Refill: 0  -     metFORMIN (GLUCOPHAGE) 1000 MG tablet; Take 1 tablet by mouth 2 (Two) Times a Day With Meals.  Dispense: 180 tablet; Refill: 1    8. Insulin-requiring or dependent type II diabetes mellitus    9. Diabetic peripheral neuropathy    10. Gastroesophageal reflux disease, unspecified whether esophagitis present  -     pantoprazole (PROTONIX) 40 MG EC tablet; Take 1 tablet by mouth Daily.  Dispense: 90 tablet; Refill: 1    11. Insomnia, unspecified type  -     traZODone (DESYREL) 50 MG tablet; Take 1 tablet by mouth Every Night.  Dispense: 90 tablet; Refill: 1    12. Chronic prostatitis  -     tamsulosin (FLOMAX) 0.4 MG capsule 24 hr capsule; Take 2 capsules by mouth every night at bedtime.  Dispense: 180 capsule; Refill: 1    13. Benign prostatic hyperplasia with nocturia    14. Chronic obstructive pulmonary disease, unspecified COPD type    15. Tobacco use  -     CT Chest Low Dose Wo; Future    16. Smoker  -     CT Chest Low Dose Wo; Future    17. Chronic pain syndrome    18. Long-term use of high-risk medication    19. Polyp of colon, unspecified part of colon, unspecified type    20. Diverticulosis    21. Declined smoking cessation    22. Encounter for diabetic foot exam    23. Personal history of nicotine dependence  -     CT Chest Low Dose Wo; Future    24. Chronic obstructive pulmonary disease with acute exacerbation  -     ipratropium-albuterol (Combivent Respimat)  MCG/ACT inhaler; Inhale 1 puff 4 (Four) Times a Day.  Dispense: 4 g; Refill: 3    Plan  Follow-up visit with diabetic foot exam completed with patient today    Patient will continue with statin and aspirin therapy    Consider monitoring blood pressure at  home    Continue to monitor blood sugars intermittently at home.  Patient will be switched from Levemir to Lantus.  Patient did have negative side effects with the Levemir.  He did tolerate the Lantus well.  He will start with 10 units nightly.    Continue to follow-up with all specialist.    Patient did mention some changes to his cardiac symptoms.  He wanted to mention these to me but he declined wanting any further testing as it is a added expense.  We will likely need to set up a follow-up appointment with cardiology in the future.    3 5 minutes spent today regarding smoking cessation.  Patient declined.    Patient will be set up for next wellness visit with diabetic foot exam and labs.  CBC, CMP, lipid panel, A1c, PSA, urinalysis, microalbumin    I will also go ahead and order the low-dose CT of the chest to be completed in August or September of this year    Go to ER if any condition worsens or severe    Follow-up in September or October for wellness    Follow Up  Return for wellness (9/12/2023) and DM foot exam.    RADHA Szymanski    Part of this note may be an electronic transcription/translation of spoken language to printed text using the Dragon Dictation System.

## 2024-05-02 ENCOUNTER — TELEPHONE (OUTPATIENT)
Dept: INTERNAL MEDICINE | Facility: CLINIC | Age: 64
End: 2024-05-02
Payer: MEDICARE

## 2024-05-02 DIAGNOSIS — Z79.4 TYPE 2 DIABETES MELLITUS WITH OTHER SPECIFIED COMPLICATION, WITH LONG-TERM CURRENT USE OF INSULIN: Primary | ICD-10-CM

## 2024-05-02 DIAGNOSIS — E11.69 TYPE 2 DIABETES MELLITUS WITH OTHER SPECIFIED COMPLICATION, WITH LONG-TERM CURRENT USE OF INSULIN: Primary | ICD-10-CM

## 2024-05-02 NOTE — TELEPHONE ENCOUNTER
Caller: Carmelo Saucedo    Relationship: Self    Best call back number: [phone]     What medication are you requesting: BASAGLAB KWIK PEN OR TRESIBA FLEX TOUCH      If a prescription is needed, what is your preferred pharmacy and phone number:    [facility] - [phone]  - [phone]       Additional notes:INSURANCE WILL NOT COVER     Insulin Glargine (LANTUS SOLOSTAR) 100 UNIT/ML injection pen AND insulin detemir (Levemir FlexPen) 100 UNIT/ML injection

## 2024-05-04 NOTE — TELEPHONE ENCOUNTER
Pt requested a phone call on results of his lab he had done 2/11.     Please Advise [phone]   Opt out

## 2024-06-24 DIAGNOSIS — E11.69 TYPE 2 DIABETES MELLITUS WITH OTHER SPECIFIED COMPLICATION, WITHOUT LONG-TERM CURRENT USE OF INSULIN: ICD-10-CM

## 2024-06-24 RX ORDER — ALCOHOL ANTISEPTIC PADS
PADS, MEDICATED (EA) TOPICAL
Qty: 100 EACH | Refills: 0 | Status: SHIPPED | OUTPATIENT
Start: 2024-06-24

## 2024-09-13 ENCOUNTER — HOSPITAL ENCOUNTER (OUTPATIENT)
Dept: CT IMAGING | Facility: HOSPITAL | Age: 64
Discharge: HOME | End: 2024-09-13
Payer: MEDICARE

## 2024-09-13 ENCOUNTER — TELEPHONE (OUTPATIENT)
Dept: INTERNAL MEDICINE | Facility: CLINIC | Age: 64
End: 2024-09-13
Payer: MEDICARE

## 2024-09-13 DIAGNOSIS — Z87.891 PERSONAL HISTORY OF NICOTINE DEPENDENCE: ICD-10-CM

## 2024-09-13 DIAGNOSIS — Z72.0 TOBACCO USE: ICD-10-CM

## 2024-09-13 DIAGNOSIS — F17.200 SMOKER: ICD-10-CM

## 2024-09-13 PROCEDURE — 71271 CT THORAX LUNG CANCER SCR C-: CPT

## 2024-09-13 NOTE — TELEPHONE ENCOUNTER
Called pt but unable to leave message as VM box not set up.     If pt does return message OK for HUB to provide message below from PCP.       ----- Message from Dianne Rea sent at 9/13/2024  5:22 PM EDT -----  Please let patient know the scan of his chest resulted.  No noted nodules.  We can continue to repeat this screening once yearly

## 2024-09-16 ENCOUNTER — TELEPHONE (OUTPATIENT)
Dept: INTERNAL MEDICINE | Facility: CLINIC | Age: 64
End: 2024-09-16
Payer: MEDICARE

## 2024-10-07 DIAGNOSIS — E11.69 TYPE 2 DIABETES MELLITUS WITH OTHER SPECIFIED COMPLICATION, WITHOUT LONG-TERM CURRENT USE OF INSULIN: ICD-10-CM

## 2024-10-07 RX ORDER — ALCOHOL ANTISEPTIC PADS
1 PADS, MEDICATED (EA) TOPICAL NIGHTLY
Qty: 100 EACH | Refills: 0 | Status: SHIPPED | OUTPATIENT
Start: 2024-10-07

## 2024-10-07 NOTE — TELEPHONE ENCOUNTER
Caller: Carmelo Saucedo    Relationship: Self    Best call back number: [phone]     Requested Prescriptions:   Requested Prescriptions     Pending Prescriptions Disp Refills    Insulin Pen Needle (Easy Comfort Pen Needles) 31G X 5 MM misc 100 each 0        Pharmacy where request should be sent: [facility] [place] - [address] - [phone]  - [phone] FX     Last office visit with prescribing clinician: 4/29/2024   Last telemedicine visit with prescribing clinician: Visit date not found   Next office visit with prescribing clinician: 10/10/2024     Additional details provided by patient: PLEASE CALL IN     Does the patient have less than a 3 day supply:  [] Yes  [x] No    Would you like a call back once the refill request has been completed: [] Yes [x] No    If the office needs to give you a call back, can they leave a voicemail: [] Yes [x] No    Manjeet Dejesus Rep   10/07/24 09:12 EDT

## 2024-10-29 DIAGNOSIS — B00.9 HSV-2 INFECTION: ICD-10-CM

## 2024-10-29 RX ORDER — ACYCLOVIR 400 MG/1
400 TABLET ORAL 2 TIMES DAILY
Qty: 180 TABLET | Refills: 3 | OUTPATIENT
Start: 2024-10-29

## 2024-10-29 NOTE — TELEPHONE ENCOUNTER
Caller: Carmelo Saucedo    Relationship: Self    Best call back number: [phone]     Requested Prescriptions:   Requested Prescriptions     Pending Prescriptions Disp Refills    acyclovir (ZOVIRAX) 400 MG tablet 180 tablet 3     Sig: Take 1 tablet by mouth 2 (Two) Times a Day. Take no more than 5 doses a day.        Pharmacy where request should be sent: [facility] [place] - [address] - [phone]  - [phone] FX     Last office visit with prescribing clinician: 4/29/2024   Last telemedicine visit with prescribing clinician: Visit date not found   Next office visit with prescribing clinician: 12/11/2024     90 DAY SUPPLY REQUESTED    Does the patient have less than a 3 day supply:  [] Yes  [x] No    Would you like a call back once the refill request has been completed: [] Yes [] No    If the office needs to give you a call back, can they leave a voicemail: [] Yes [] No    Manjeet Cochran Rep   10/29/24 13:03 EDT

## 2024-11-04 ENCOUNTER — TELEPHONE (OUTPATIENT)
Dept: INTERNAL MEDICINE | Facility: CLINIC | Age: 64
End: 2024-11-04
Payer: MEDICARE

## 2024-11-04 NOTE — TELEPHONE ENCOUNTER
PT requested a refill on 10/29/2024 for ACYCLOVIR 400 MG tablets and its showing that it was denied because provider not at this office.  Pt has only 1 day of medication left.

## 2024-11-05 ENCOUNTER — TELEPHONE (OUTPATIENT)
Dept: INTERNAL MEDICINE | Facility: CLINIC | Age: 64
End: 2024-11-05
Payer: MEDICARE

## 2024-11-05 DIAGNOSIS — B00.9 HSV-2 INFECTION: ICD-10-CM

## 2024-11-05 RX ORDER — ACYCLOVIR 400 MG/1
400 TABLET ORAL 2 TIMES DAILY
Qty: 180 TABLET | Refills: 0 | Status: SHIPPED | OUTPATIENT
Start: 2024-11-05

## 2024-11-05 NOTE — TELEPHONE ENCOUNTER
Caller: Carmelo aSucedo    Relationship: Self    Best call back number: [phone]     Requested Prescriptions:   Requested Prescriptions     Pending Prescriptions Disp Refills    acyclovir (ZOVIRAX) 400 MG tablet 180 tablet 3     Sig: Take 1 tablet by mouth 2 (Two) Times a Day. Take no more than 5 doses a day.        Pharmacy where request should be sent: [facility] [place] [address] - [phone]  - [phone]      Last office visit with prescribing clinician: 4/29/2024   Last telemedicine visit with prescribing clinician: Visit date not found   Next office visit with prescribing clinician: 12/11/2024     Additional details provided by patient: PATIENT HAS CALLED REQUESTING A NEW 90 DAY PRESCRIPTION ON ABOVE MEDICATION. PATIENT IS OUT OF MEDICATION AND IS NEEDING PRESCRIPTION SENT IN [facility]. PATIENT STATES HE HAS BEEN AT LEAST A WEEK TRYING TO GET THIS MEDICATION.     Does the patient have less than a 3 day supply:  [x] Yes  [] No    Would you like a call back once the refill request has been completed: [] Yes [x] No    If the office needs to give you a call back, can they leave a voicemail: [] Yes [x] No    Antira Galan   11/05/24 10:03 EST

## 2024-11-05 NOTE — TELEPHONE ENCOUNTER
Notified patient that meds were refilled and refills would go back to normal at upcoming appt on 12/11/24. Patient verbalized understanding and has no further questions or concerns at this time.

## 2024-12-03 ENCOUNTER — TELEPHONE (OUTPATIENT)
Dept: INTERNAL MEDICINE | Facility: CLINIC | Age: 64
End: 2024-12-03

## 2024-12-03 NOTE — TELEPHONE ENCOUNTER
Caller: Carmelo Saucedo    Relationship: Self    Best call back number: [phone]     What medication are you requesting: Z-GARETH    What are your current symptoms: LOSS OF TASTE AND SMELL, HEADACHE, COUGH, CONGESTION, RUNNY NOSE    How long have you been experiencing symptoms: SINCE 11/28/24     Have you had these symptoms before:    [] Yes  [x] No    Have you been treated for these symptoms before:   [] Yes  [x] No    If a prescription is needed, what is your preferred pharmacy and phone number: [facility]"" - [place] [address] [phone] [facility] [phone] FX     Additional notes:

## 2024-12-03 NOTE — TELEPHONE ENCOUNTER
Spoke with patient and he stated he has not tested positive for covid or been around anyone that has been tested. He is scheduled for a wellness appointment on 12/11/2024. Can something be called in or would he need to be seen?

## 2024-12-04 ENCOUNTER — OFFICE VISIT (OUTPATIENT)
Dept: INTERNAL MEDICINE | Facility: CLINIC | Age: 64
End: 2024-12-04
Payer: MEDICARE

## 2024-12-04 VITALS
HEART RATE: 69 BPM | HEIGHT: 73 IN | SYSTOLIC BLOOD PRESSURE: 124 MMHG | DIASTOLIC BLOOD PRESSURE: 82 MMHG | TEMPERATURE: 97.9 F | BODY MASS INDEX: 29.18 KG/M2 | WEIGHT: 220.2 LBS | OXYGEN SATURATION: 95 %

## 2024-12-04 DIAGNOSIS — E11.69 TYPE 2 DIABETES MELLITUS WITH OTHER SPECIFIED COMPLICATION, WITHOUT LONG-TERM CURRENT USE OF INSULIN: ICD-10-CM

## 2024-12-04 DIAGNOSIS — R09.81 NASAL CONGESTION: Primary | ICD-10-CM

## 2024-12-04 DIAGNOSIS — J44.1 COPD WITH EXACERBATION: ICD-10-CM

## 2024-12-04 LAB
EXPIRATION DATE: NORMAL
FLUAV AG UPPER RESP QL IA.RAPID: NOT DETECTED
FLUBV AG UPPER RESP QL IA.RAPID: NOT DETECTED
INTERNAL CONTROL: NORMAL
Lab: NORMAL
SARS-COV-2 AG UPPER RESP QL IA.RAPID: NOT DETECTED

## 2024-12-04 PROCEDURE — 3074F SYST BP LT 130 MM HG: CPT | Performed by: NURSE PRACTITIONER

## 2024-12-04 PROCEDURE — 1159F MED LIST DOCD IN RCRD: CPT | Performed by: NURSE PRACTITIONER

## 2024-12-04 PROCEDURE — 1126F AMNT PAIN NOTED NONE PRSNT: CPT | Performed by: NURSE PRACTITIONER

## 2024-12-04 PROCEDURE — 99214 OFFICE O/P EST MOD 30 MIN: CPT | Performed by: NURSE PRACTITIONER

## 2024-12-04 PROCEDURE — G2211 COMPLEX E/M VISIT ADD ON: HCPCS | Performed by: NURSE PRACTITIONER

## 2024-12-04 PROCEDURE — 3079F DIAST BP 80-89 MM HG: CPT | Performed by: NURSE PRACTITIONER

## 2024-12-04 PROCEDURE — 87428 SARSCOV & INF VIR A&B AG IA: CPT | Performed by: NURSE PRACTITIONER

## 2024-12-04 PROCEDURE — 1160F RVW MEDS BY RX/DR IN RCRD: CPT | Performed by: NURSE PRACTITIONER

## 2024-12-04 PROCEDURE — 3044F HG A1C LEVEL LT 7.0%: CPT | Performed by: NURSE PRACTITIONER

## 2024-12-04 RX ORDER — AZITHROMYCIN 250 MG/1
TABLET, FILM COATED ORAL
Qty: 6 TABLET | Refills: 0 | Status: SHIPPED | OUTPATIENT
Start: 2024-12-04

## 2024-12-04 RX ORDER — OXYMETAZOLINE HYDROCHLORIDE 0.05 G/100ML
2 SPRAY NASAL 2 TIMES DAILY
Qty: 30 ML | Refills: 0 | Status: SHIPPED | OUTPATIENT
Start: 2024-12-04

## 2024-12-04 RX ORDER — DEXTROMETHORPHAN HYDROBROMIDE AND PROMETHAZINE HYDROCHLORIDE 15; 6.25 MG/5ML; MG/5ML
5 SYRUP ORAL 4 TIMES DAILY PRN
Qty: 473 ML | Refills: 0 | Status: SHIPPED | OUTPATIENT
Start: 2024-12-04

## 2024-12-04 RX ORDER — BENZONATATE 100 MG/1
100 CAPSULE ORAL 3 TIMES DAILY PRN
Qty: 30 CAPSULE | Refills: 0 | Status: SHIPPED | OUTPATIENT
Start: 2024-12-04 | End: 2024-12-14

## 2024-12-04 RX ORDER — NASAL AIRFLOW STRIPS
1 STRIP TOPICAL DAILY
Qty: 10 STRIP | Refills: 0 | Status: SHIPPED | OUTPATIENT
Start: 2024-12-04 | End: 2024-12-14

## 2024-12-04 NOTE — PROGRESS NOTES
Office Note     Name: Carmelo Saucedo    : 1960     MRN: [id number]     Chief Complaint  Headache (Patient reports today for symptoms including headache, nasal congestion and loss of smell. Patient states his symptoms have been going on for around and he has received slight relief with OTC medication. Patient states he has not been exposed to any known illnesses. ) and Nasal Congestion (Patient has been feeling bad since . )    Subjective     History of Present Illness:  Carmelo Saucedo is a 64 y.o. male who presents today for sick symptoms.    Patient states that since  he has not felt well.  He describes that he has had no taste or smell.  He has had a headache with quite a bit of nasal congestion.  He is taken over-the-counter medication with minimal relief of symptoms.  He also notices cough and increase in sputum production.  Patient does have known history of COPD.  He has been using his as needed inhaler.  As well as his other regularly prescribed medication.  No fevers        Past Medical History:   Diagnosis Date    Abnormal EKG     Angular cheilitis     Arthritis     Benign prostatic hypertrophy     Bipolar disorder     Chest pain     Chronic pain     COPD (chronic obstructive pulmonary disease)     Enlarged prostate without lower urinary tract symptoms (luts)     Esophageal reflux     Floppy eyelid syndrome     H/O tobacco use, presenting hazards to health     Hematochezia     Herpes simplex infection     Hyperlipidemia     Hypertension     Insomnia     Lower back pain     Migraine without status migrainosus, not intractable     Nephrolithiasis     Peptic ulcer     Pulmonary nodule     Rectal polyp     Rectal polyp     Sialoadenitis     Skin ulcer of neck     Uncontrolled type 2 diabetes mellitus with complication        Past Surgical History:   Procedure Laterality Date    ADENOIDECTOMY      CHOLECYSTECTOMY      GALLBLADDER SURGERY      KNEE SURGERY Bilateral     release  x5    SKIN CANCER DESTRUCTION      TONSILLECTOMY         Social History     Socioeconomic History    Marital status:    Tobacco Use    Smoking status: Every Day     Current packs/day: 2.00     Average packs/day: 2.0 packs/day for 50.0 years (100.0 ttl pk-yrs)     Types: Cigarettes    Smokeless tobacco: Never   Vaping Use    Vaping status: Never Used   Substance and Sexual Activity    Alcohol use: No    Drug use: No    Sexual activity: Defer         Current Outpatient Medications:     acyclovir (ZOVIRAX) 400 MG tablet, Take 1 tablet by mouth 2 (Two) Times a Day. Take no more than 5 doses a day., Disp: 180 tablet, Rfl: 0    albuterol sulfate  (90 Base) MCG/ACT inhaler, Inhale 2 puffs Every 4 (Four) Hours As Needed for Wheezing or Shortness of Air., Disp: 18 g, Rfl: 3    aspirin 81 MG chewable tablet, Chew 1 tablet Daily., Disp: 90 tablet, Rfl: 1    atorvastatin (LIPITOR) 20 MG tablet, Take 1 tablet by mouth every night at bedtime., Disp: 90 tablet, Rfl: 1    Capsaicin 0.075 % stick, Apply 1 application  topically 3 (Three) Times a Day., Disp: 1 g, Rfl: 5    gabapentin (NEURONTIN) 600 MG tablet, Take 1 tablet by mouth Every 6 (Six) Hours., Disp: , Rfl:     glucose blood test strip, Use as instructed to check blood sugar three times daily, Disp: 200 each, Rfl: 6    glucose monitor monitoring kit, 1 each Daily. Patient requests One Touch Glucose Meter.  Please substitute covered meter per insurance if needed., Disp: 1 each, Rfl: 0    hydroCHLOROthiazide (MICROZIDE) 12.5 MG capsule, Take 1 capsule by mouth Daily., Disp: 90 capsule, Rfl: 1    HYDROcodone-acetaminophen (NORCO) 7.5-325 MG per tablet, Take 1 tablet by mouth 3 (Three) Times a Day., Disp: , Rfl: 0    insulin degludec (TRESIBA FLEXTOUCH) 100 UNIT/ML solution pen-injector injection, Inject 10 Units under the skin into the appropriate area as directed Daily. Increase by 2 units every 2 days for sugars over 150 in the morning, Disp: 3 mL, Rfl: 2     insulin detemir (Levemir FlexPen) 100 UNIT/ML injection, Inject 10 Units under the skin into the appropriate area as directed Every Night. (Patient taking differently: Inject 8 Units under the skin into the appropriate area as directed Every Night.), Disp: 9 mL, Rfl: 3    Insulin Glargine (LANTUS SOLOSTAR) 100 UNIT/ML injection pen, Inject 10 Units under the skin into the appropriate area as directed Every Night., Disp: 15 mL, Rfl: 0    Insulin Pen Needle (Easy Comfort Pen Needles) 31G X 5 MM misc, Apply 1 each topically Every Night., Disp: 100 each, Rfl: 0    ipratropium-albuterol (Combivent Respimat)  MCG/ACT inhaler, Inhale 1 puff 4 (Four) Times a Day., Disp: 4 g, Rfl: 3    ipratropium-albuterol (DUONEB) 0.5-2.5 mg/3 ml nebulizer, Take 3 mL by nebulization Every 6 (Six) Hours., Disp: 120 vial, Rfl: 3    levothyroxine (SYNTHROID, LEVOTHROID) 88 MCG tablet, Take 1 tablet by mouth Every Morning Before Breakfast., Disp: 90 tablet, Rfl: 1    losartan (COZAAR) 25 MG tablet, Take 1 tablet by mouth Daily. Replaces lisinopril, Disp: 90 tablet, Rfl: 1    metFORMIN (GLUCOPHAGE) 1000 MG tablet, Take 1 tablet by mouth 2 (Two) Times a Day With Meals., Disp: 180 tablet, Rfl: 1    metoprolol succinate XL (TOPROL-XL) 50 MG 24 hr tablet, Take 1 tablet by mouth Daily., Disp: 90 tablet, Rfl: 1    pantoprazole (PROTONIX) 40 MG EC tablet, Take 1 tablet by mouth Daily., Disp: 90 tablet, Rfl: 1    sildenafil (REVATIO) 20 MG tablet, TAKE TWO TO THREE TABLETS BY MOUTH ONE HOUR PRIOR TO INTERCOURSE, Disp: 10 tablet, Rfl: 0    tamsulosin (FLOMAX) 0.4 MG capsule 24 hr capsule, Take 2 capsules by mouth every night at bedtime., Disp: 180 capsule, Rfl: 1    traZODone (DESYREL) 50 MG tablet, Take 1 tablet by mouth Every Night., Disp: 90 tablet, Rfl: 1    azithromycin (Zithromax Z-Michael) 250 MG tablet, Take 2 tablets by mouth on day 1, then 1 tablet daily on days 2-5, Disp: 6 tablet, Rfl: 0    benzonatate (Tessalon Perles) 100 MG capsule,  "Take 1 capsule by mouth 3 (Three) Times a Day As Needed for Cough for up to 10 days., Disp: 30 capsule, Rfl: 0    Nasal Dilators (Breathe Right Advanced) strip, Use 1 each Daily for 10 days., Disp: 10 strip, Rfl: 0    oxymetazoline (Afrin Nasal Spray) 0.05 % nasal spray, Administer 2 sprays into the nostril(s) as directed by provider 2 (Two) Times a Day., Disp: 30 mL, Rfl: 0    promethazine-dextromethorphan (PROMETHAZINE-DM) 6.25-15 MG/5ML syrup, Take 5 mL by mouth 4 (Four) Times a Day As Needed for Cough., Disp: 473 mL, Rfl: 0    Objective     Vital Signs  /82 (BP Location: Left arm, Patient Position: Sitting, Cuff Size: Adult)   Pulse 69   Temp 97.9 °F (36.6 °C)   Ht 185.4 cm (72.99\")   Wt 99.9 kg (220 lb 3.2 oz)   SpO2 95%   BMI 29.06 kg/m²   Estimated body mass index is 29.06 kg/m² as calculated from the following:    Height as of this encounter: 185.4 cm (72.99\").    Weight as of this encounter: 99.9 kg (220 lb 3.2 oz).             Physical Exam  Vitals and nursing note reviewed.   Constitutional:       General: He is awake.      Appearance: Normal appearance. He is well-groomed.   HENT:      Head: Normocephalic and atraumatic.      Right Ear: Hearing, tympanic membrane, ear canal and external ear normal.      Left Ear: Hearing, tympanic membrane, ear canal and external ear normal.      Nose: Congestion present.      Right Sinus: No maxillary sinus tenderness or frontal sinus tenderness.      Left Sinus: No maxillary sinus tenderness or frontal sinus tenderness.   Eyes:      Extraocular Movements: Extraocular movements intact.      Pupils: Pupils are equal, round, and reactive to light.   Cardiovascular:      Rate and Rhythm: Normal rate and regular rhythm.      Heart sounds: Normal heart sounds.   Pulmonary:      Effort: Pulmonary effort is normal.      Breath sounds: Normal breath sounds.      Comments: Cough noted on exam  Musculoskeletal:         General: Normal range of motion.   Skin:     " General: Skin is warm and dry.   Neurological:      Mental Status: He is alert and oriented to person, place, and time.   Psychiatric:         Mood and Affect: Mood normal.         Behavior: Behavior normal. Behavior is cooperative.          Lab Review:   Latest Reference Range & Units 12/04/24 09:00   SARS Antigen Not Detected, Presumptive Negative  Not Detected   Expiration Date  10/18/25   Lot Number  [id number]   Influenza A Antigen EVELIA Not Detected  Not Detected   Influenza B Antigen EVELIA Not Detected  Not Detected            Assessment and Plan     Diagnoses and all orders for this visit:    1. Nasal congestion (Primary)  -     POCT SARS-CoV-2 Antigen EVELIA + Flu  -     azithromycin (Zithromax Z-Mihcael) 250 MG tablet; Take 2 tablets by mouth on day 1, then 1 tablet daily on days 2-5  Dispense: 6 tablet; Refill: 0  -     Nasal Dilators (Breathe Right Advanced) strip; Use 1 each Daily for 10 days.  Dispense: 10 strip; Refill: 0    2. COPD with exacerbation  -     oxymetazoline (Afrin Nasal Spray) 0.05 % nasal spray; Administer 2 sprays into the nostril(s) as directed by provider 2 (Two) Times a Day.  Dispense: 30 mL; Refill: 0  -     benzonatate (Tessalon Perles) 100 MG capsule; Take 1 capsule by mouth 3 (Three) Times a Day As Needed for Cough for up to 10 days.  Dispense: 30 capsule; Refill: 0  -     promethazine-dextromethorphan (PROMETHAZINE-DM) 6.25-15 MG/5ML syrup; Take 5 mL by mouth 4 (Four) Times a Day As Needed for Cough.  Dispense: 473 mL; Refill: 0    3. Type 2 diabetes mellitus with other specified complication, without long-term current use of insulin    Plan  Discussed results of in office testing with patient today  Based on physical assessment and symptoms, patient will be treated for COPD exacerbation.  Discussed with patient I will not move forward with steroids due to history of diabetes.  A1c is currently at 6.7.  Patient will continue with current plan of care regarding his diabetes.  Additional  medication sent to pharmacy to also assist with symptoms.  Continue to stay well-hydrated.  Continue coughing and deep breathing exercises.  Go to ER if any condition worsens or severe  Plan to follow-up as scheduled    Follow Up  Return for Next scheduled follow up.    RADHA Szymanski    Part of this note may be an electronic transcription/translation of spoken language to printed text using the Dragon Dictation System.

## 2024-12-11 ENCOUNTER — LAB (OUTPATIENT)
Dept: LAB | Facility: HOSPITAL | Age: 64
End: 2024-12-11
Payer: MEDICARE

## 2024-12-11 ENCOUNTER — OFFICE VISIT (OUTPATIENT)
Dept: INTERNAL MEDICINE | Facility: CLINIC | Age: 64
End: 2024-12-11
Payer: MEDICARE

## 2024-12-11 VITALS
HEIGHT: 73 IN | OXYGEN SATURATION: 93 % | BODY MASS INDEX: 29.05 KG/M2 | DIASTOLIC BLOOD PRESSURE: 72 MMHG | RESPIRATION RATE: 16 BRPM | HEART RATE: 74 BPM | SYSTOLIC BLOOD PRESSURE: 120 MMHG | WEIGHT: 219.2 LBS

## 2024-12-11 DIAGNOSIS — E78.2 MIXED HYPERLIPIDEMIA: ICD-10-CM

## 2024-12-11 DIAGNOSIS — Z79.82 LONG-TERM USE OF ASPIRIN THERAPY: ICD-10-CM

## 2024-12-11 DIAGNOSIS — G89.4 CHRONIC PAIN SYNDROME: ICD-10-CM

## 2024-12-11 DIAGNOSIS — F25.0 SCHIZOAFFECTIVE DISORDER, BIPOLAR TYPE: ICD-10-CM

## 2024-12-11 DIAGNOSIS — Z00.00 ENCOUNTER FOR WELL ADULT EXAM WITHOUT ABNORMAL FINDINGS: ICD-10-CM

## 2024-12-11 DIAGNOSIS — E11.9 ENCOUNTER FOR DIABETIC FOOT EXAM: ICD-10-CM

## 2024-12-11 DIAGNOSIS — Z79.899 LONG-TERM USE OF HIGH-RISK MEDICATION: ICD-10-CM

## 2024-12-11 DIAGNOSIS — F17.200 SMOKER: ICD-10-CM

## 2024-12-11 DIAGNOSIS — R35.1 BENIGN PROSTATIC HYPERPLASIA WITH NOCTURIA: ICD-10-CM

## 2024-12-11 DIAGNOSIS — G47.00 INSOMNIA, UNSPECIFIED TYPE: ICD-10-CM

## 2024-12-11 DIAGNOSIS — E11.9 INSULIN-REQUIRING OR DEPENDENT TYPE II DIABETES MELLITUS: ICD-10-CM

## 2024-12-11 DIAGNOSIS — Z00.00 ENCOUNTER FOR SUBSEQUENT ANNUAL WELLNESS VISIT (AWV) IN MEDICARE PATIENT: Primary | ICD-10-CM

## 2024-12-11 DIAGNOSIS — B00.9 HSV-2 INFECTION: ICD-10-CM

## 2024-12-11 DIAGNOSIS — Z79.899 ON STATIN THERAPY: ICD-10-CM

## 2024-12-11 DIAGNOSIS — Z91.81 AT LOW RISK FOR FALL: ICD-10-CM

## 2024-12-11 DIAGNOSIS — E03.9 ACQUIRED HYPOTHYROIDISM: ICD-10-CM

## 2024-12-11 DIAGNOSIS — Z28.21 IMMUNIZATION DECLINED: ICD-10-CM

## 2024-12-11 DIAGNOSIS — Z72.0 TOBACCO USE: ICD-10-CM

## 2024-12-11 DIAGNOSIS — Z79.4 INSULIN-REQUIRING OR DEPENDENT TYPE II DIABETES MELLITUS: ICD-10-CM

## 2024-12-11 DIAGNOSIS — I10 ESSENTIAL HYPERTENSION: ICD-10-CM

## 2024-12-11 DIAGNOSIS — K57.90 DIVERTICULOSIS: ICD-10-CM

## 2024-12-11 DIAGNOSIS — K63.5 POLYP OF COLON, UNSPECIFIED PART OF COLON, UNSPECIFIED TYPE: ICD-10-CM

## 2024-12-11 DIAGNOSIS — Z13.31 DEPRESSION SCREEN: ICD-10-CM

## 2024-12-11 DIAGNOSIS — N18.31 STAGE 3A CHRONIC KIDNEY DISEASE: ICD-10-CM

## 2024-12-11 DIAGNOSIS — N41.1 CHRONIC PROSTATITIS: ICD-10-CM

## 2024-12-11 DIAGNOSIS — J44.9 CHRONIC OBSTRUCTIVE PULMONARY DISEASE, UNSPECIFIED COPD TYPE: ICD-10-CM

## 2024-12-11 DIAGNOSIS — N40.1 BENIGN PROSTATIC HYPERPLASIA WITH NOCTURIA: ICD-10-CM

## 2024-12-11 DIAGNOSIS — Z12.5 SCREENING PSA (PROSTATE SPECIFIC ANTIGEN): ICD-10-CM

## 2024-12-11 DIAGNOSIS — K21.9 GASTROESOPHAGEAL REFLUX DISEASE, UNSPECIFIED WHETHER ESOPHAGITIS PRESENT: ICD-10-CM

## 2024-12-11 DIAGNOSIS — F41.3 OTHER MIXED ANXIETY DISORDERS: ICD-10-CM

## 2024-12-11 DIAGNOSIS — E11.42 DIABETIC PERIPHERAL NEUROPATHY: ICD-10-CM

## 2024-12-11 PROBLEM — H02.826: Status: RESOLVED | Noted: 2021-07-26 | Resolved: 2024-12-11

## 2024-12-11 PROBLEM — N48.1 BALANITIS: Status: RESOLVED | Noted: 2017-08-09 | Resolved: 2024-12-11

## 2024-12-11 LAB
ALBUMIN SERPL-MCNC: 4.1 G/DL (ref 3.5–5.2)
ALBUMIN UR-MCNC: 1.8 MG/DL
ALBUMIN/GLOB SERPL: 1.6 G/DL
ALP SERPL-CCNC: 101 U/L (ref 39–117)
ALT SERPL W P-5'-P-CCNC: 20 U/L (ref 1–41)
ANION GAP SERPL CALCULATED.3IONS-SCNC: 9 MMOL/L (ref 5–15)
AST SERPL-CCNC: 24 U/L (ref 1–40)
BILIRUB SERPL-MCNC: 0.4 MG/DL (ref 0–1.2)
BILIRUB UR QL STRIP: NEGATIVE
BUN SERPL-MCNC: 14 MG/DL (ref 8–23)
BUN/CREAT SERPL: 8.2 (ref 7–25)
CALCIUM SPEC-SCNC: 9.6 MG/DL (ref 8.6–10.5)
CHLORIDE SERPL-SCNC: 104 MMOL/L (ref 98–107)
CHOLEST SERPL-MCNC: 114 MG/DL (ref 0–200)
CLARITY UR: CLEAR
CO2 SERPL-SCNC: 28 MMOL/L (ref 22–29)
COLOR UR: YELLOW
CREAT SERPL-MCNC: 1.71 MG/DL (ref 0.76–1.27)
DEPRECATED RDW RBC AUTO: 43 FL (ref 37–54)
EGFRCR SERPLBLD CKD-EPI 2021: 43.6 ML/MIN/1.73
ERYTHROCYTE [DISTWIDTH] IN BLOOD BY AUTOMATED COUNT: 12.6 % (ref 12.3–15.4)
GLOBULIN UR ELPH-MCNC: 2.6 GM/DL
GLUCOSE SERPL-MCNC: 146 MG/DL (ref 65–99)
GLUCOSE UR STRIP-MCNC: NEGATIVE MG/DL
HBA1C MFR BLD: 7.3 % (ref 4.8–5.6)
HCT VFR BLD AUTO: 45.4 % (ref 37.5–51)
HDLC SERPL-MCNC: 25 MG/DL (ref 40–60)
HGB BLD-MCNC: 15.9 G/DL (ref 13–17.7)
HGB UR QL STRIP.AUTO: NEGATIVE
HOLD SPECIMEN: NORMAL
KETONES UR QL STRIP: NEGATIVE
LDLC SERPL CALC-MCNC: 68 MG/DL (ref 0–100)
LDLC/HDLC SERPL: 2.65 {RATIO}
LEUKOCYTE ESTERASE UR QL STRIP.AUTO: NEGATIVE
MCH RBC QN AUTO: 32.8 PG (ref 26.6–33)
MCHC RBC AUTO-ENTMCNC: 35 G/DL (ref 31.5–35.7)
MCV RBC AUTO: 93.6 FL (ref 79–97)
NITRITE UR QL STRIP: NEGATIVE
PH UR STRIP.AUTO: 6.5 [PH] (ref 5–8)
PLATELET # BLD AUTO: 279 10*3/MM3 (ref 140–450)
PMV BLD AUTO: 11.6 FL (ref 6–12)
POTASSIUM SERPL-SCNC: 4.1 MMOL/L (ref 3.5–5.2)
PROT SERPL-MCNC: 6.7 G/DL (ref 6–8.5)
PROT UR QL STRIP: ABNORMAL
PSA SERPL-MCNC: 3.47 NG/ML (ref 0–4)
RBC # BLD AUTO: 4.85 10*6/MM3 (ref 4.14–5.8)
SODIUM SERPL-SCNC: 141 MMOL/L (ref 136–145)
SP GR UR STRIP: 1.02 (ref 1–1.03)
TRIGL SERPL-MCNC: 114 MG/DL (ref 0–150)
TSH SERPL DL<=0.05 MIU/L-ACNC: 1.36 UIU/ML (ref 0.27–4.2)
UROBILINOGEN UR QL STRIP: ABNORMAL
VLDLC SERPL-MCNC: 21 MG/DL (ref 5–40)
WBC NRBC COR # BLD AUTO: 8.41 10*3/MM3 (ref 3.4–10.8)

## 2024-12-11 PROCEDURE — G0439 PPPS, SUBSEQ VISIT: HCPCS | Performed by: NURSE PRACTITIONER

## 2024-12-11 PROCEDURE — 83036 HEMOGLOBIN GLYCOSYLATED A1C: CPT | Performed by: NURSE PRACTITIONER

## 2024-12-11 PROCEDURE — 80053 COMPREHEN METABOLIC PANEL: CPT | Performed by: NURSE PRACTITIONER

## 2024-12-11 PROCEDURE — 3048F LDL-C <100 MG/DL: CPT | Performed by: NURSE PRACTITIONER

## 2024-12-11 PROCEDURE — 1159F MED LIST DOCD IN RCRD: CPT | Performed by: NURSE PRACTITIONER

## 2024-12-11 PROCEDURE — 3078F DIAST BP <80 MM HG: CPT | Performed by: NURSE PRACTITIONER

## 2024-12-11 PROCEDURE — 84443 ASSAY THYROID STIM HORMONE: CPT | Performed by: NURSE PRACTITIONER

## 2024-12-11 PROCEDURE — 99214 OFFICE O/P EST MOD 30 MIN: CPT | Performed by: NURSE PRACTITIONER

## 2024-12-11 PROCEDURE — 3044F HG A1C LEVEL LT 7.0%: CPT | Performed by: NURSE PRACTITIONER

## 2024-12-11 PROCEDURE — 1125F AMNT PAIN NOTED PAIN PRSNT: CPT | Performed by: NURSE PRACTITIONER

## 2024-12-11 PROCEDURE — 3061F NEG MICROALBUMINURIA REV: CPT | Performed by: NURSE PRACTITIONER

## 2024-12-11 PROCEDURE — 1170F FXNL STATUS ASSESSED: CPT | Performed by: NURSE PRACTITIONER

## 2024-12-11 PROCEDURE — G0103 PSA SCREENING: HCPCS | Performed by: NURSE PRACTITIONER

## 2024-12-11 PROCEDURE — 85027 COMPLETE CBC AUTOMATED: CPT | Performed by: NURSE PRACTITIONER

## 2024-12-11 PROCEDURE — 80061 LIPID PANEL: CPT | Performed by: NURSE PRACTITIONER

## 2024-12-11 PROCEDURE — 1160F RVW MEDS BY RX/DR IN RCRD: CPT | Performed by: NURSE PRACTITIONER

## 2024-12-11 PROCEDURE — 82043 UR ALBUMIN QUANTITATIVE: CPT | Performed by: NURSE PRACTITIONER

## 2024-12-11 PROCEDURE — 3074F SYST BP LT 130 MM HG: CPT | Performed by: NURSE PRACTITIONER

## 2024-12-11 PROCEDURE — 81003 URINALYSIS AUTO W/O SCOPE: CPT | Performed by: NURSE PRACTITIONER

## 2024-12-11 RX ORDER — IPRATROPIUM BROMIDE AND ALBUTEROL 20; 100 UG/1; UG/1
1 SPRAY, METERED RESPIRATORY (INHALATION) 4 TIMES DAILY
Qty: 4 G | Refills: 5 | Status: SHIPPED | OUTPATIENT
Start: 2024-12-11

## 2024-12-11 RX ORDER — TAMSULOSIN HYDROCHLORIDE 0.4 MG/1
2 CAPSULE ORAL
Qty: 180 CAPSULE | Refills: 3 | Status: SHIPPED | OUTPATIENT
Start: 2024-12-11

## 2024-12-11 RX ORDER — LEVOTHYROXINE SODIUM 88 UG/1
88 TABLET ORAL
Qty: 90 TABLET | Refills: 3 | Status: SHIPPED | OUTPATIENT
Start: 2024-12-11

## 2024-12-11 RX ORDER — ASPIRIN 81 MG/1
81 TABLET, CHEWABLE ORAL DAILY
Qty: 90 TABLET | Refills: 3 | Status: SHIPPED | OUTPATIENT
Start: 2024-12-11

## 2024-12-11 RX ORDER — PANTOPRAZOLE SODIUM 40 MG/1
40 TABLET, DELAYED RELEASE ORAL DAILY
Qty: 90 TABLET | Refills: 3 | Status: SHIPPED | OUTPATIENT
Start: 2024-12-11

## 2024-12-11 RX ORDER — ACYCLOVIR 400 MG/1
400 TABLET ORAL 2 TIMES DAILY
Qty: 180 TABLET | Refills: 3 | Status: SHIPPED | OUTPATIENT
Start: 2024-12-11

## 2024-12-11 RX ORDER — ALBUTEROL SULFATE 90 UG/1
2 INHALANT RESPIRATORY (INHALATION) EVERY 4 HOURS PRN
Qty: 18 G | Refills: 1 | Status: SHIPPED | OUTPATIENT
Start: 2024-12-11

## 2024-12-11 RX ORDER — METOPROLOL SUCCINATE 50 MG/1
50 TABLET, EXTENDED RELEASE ORAL DAILY
Qty: 90 TABLET | Refills: 3 | Status: SHIPPED | OUTPATIENT
Start: 2024-12-11

## 2024-12-11 RX ORDER — HYDROCHLOROTHIAZIDE 12.5 MG/1
12.5 CAPSULE ORAL DAILY
Qty: 90 CAPSULE | Refills: 3 | Status: SHIPPED | OUTPATIENT
Start: 2024-12-11

## 2024-12-11 RX ORDER — ATORVASTATIN CALCIUM 20 MG/1
20 TABLET, FILM COATED ORAL
Qty: 90 TABLET | Refills: 3 | Status: SHIPPED | OUTPATIENT
Start: 2024-12-11

## 2024-12-11 RX ORDER — ALCOHOL ANTISEPTIC PADS
1 PADS, MEDICATED (EA) TOPICAL NIGHTLY
Qty: 100 EACH | Refills: 11 | Status: SHIPPED | OUTPATIENT
Start: 2024-12-11

## 2024-12-11 RX ORDER — LOSARTAN POTASSIUM 25 MG/1
25 TABLET ORAL DAILY
Qty: 90 TABLET | Refills: 3 | Status: SHIPPED | OUTPATIENT
Start: 2024-12-11

## 2024-12-11 RX ORDER — TRAZODONE HYDROCHLORIDE 50 MG/1
50 TABLET, FILM COATED ORAL NIGHTLY
Qty: 90 TABLET | Refills: 3 | Status: SHIPPED | OUTPATIENT
Start: 2024-12-11

## 2024-12-11 NOTE — PROGRESS NOTES
Subjective   The ABCs of the Annual Wellness Visit  Medicare Wellness Visit      Carmelo Saucedo is a 64 y.o. patient who presents for a Medicare Wellness Visit.    The following portions of the patient's history were reviewed and   updated as appropriate: allergies, current medications, past family history, past medical history, past social history, past surgical history, and problem list.    Compared to one year ago, the patient's physical   health is the same.  Compared to one year ago, the patient's mental   health is the same.    Recent Hospitalizations:  He was not admitted to the hospital during the last year.     Current Medical Providers:  Patient Care Team:  Dianne Rea APRN as PCP - General (Nurse Practitioner)    Outpatient Medications Prior to Visit   Medication Sig Dispense Refill    benzonatate (Tessalon Perles) 100 MG capsule Take 1 capsule by mouth 3 (Three) Times a Day As Needed for Cough for up to 10 days. 30 capsule 0    gabapentin (NEURONTIN) 600 MG tablet Take 1 tablet by mouth Every 6 (Six) Hours.      glucose blood test strip Use as instructed to check blood sugar three times daily 200 each 6    glucose monitor monitoring kit 1 each Daily. Patient requests One Touch Glucose Meter.  Please substitute covered meter per insurance if needed. 1 each 0    HYDROcodone-acetaminophen (NORCO) 7.5-325 MG per tablet Take 1 tablet by mouth 3 (Three) Times a Day.  0    insulin detemir (Levemir FlexPen) 100 UNIT/ML injection Inject 10 Units under the skin into the appropriate area as directed Every Night. (Patient taking differently: Inject 8 Units under the skin into the appropriate area as directed Every Night.) 9 mL 3    Insulin Glargine (LANTUS SOLOSTAR) 100 UNIT/ML injection pen Inject 10 Units under the skin into the appropriate area as directed Every Night. 15 mL 0    ipratropium-albuterol (DUONEB) 0.5-2.5 mg/3 ml nebulizer Take 3 mL by nebulization Every 6 (Six) Hours. 120 vial 3    Nasal  Dilators (Breathe Right Advanced) strip Use 1 each Daily for 10 days. 10 strip 0    oxymetazoline (Afrin Nasal Spray) 0.05 % nasal spray Administer 2 sprays into the nostril(s) as directed by provider 2 (Two) Times a Day. 30 mL 0    promethazine-dextromethorphan (PROMETHAZINE-DM) 6.25-15 MG/5ML syrup Take 5 mL by mouth 4 (Four) Times a Day As Needed for Cough. 473 mL 0    sildenafil (REVATIO) 20 MG tablet TAKE TWO TO THREE TABLETS BY MOUTH ONE HOUR PRIOR TO INTERCOURSE 10 tablet 0    acyclovir (ZOVIRAX) 400 MG tablet Take 1 tablet by mouth 2 (Two) Times a Day. Take no more than 5 doses a day. 180 tablet 0    albuterol sulfate  (90 Base) MCG/ACT inhaler Inhale 2 puffs Every 4 (Four) Hours As Needed for Wheezing or Shortness of Air. 18 g 3    aspirin 81 MG chewable tablet Chew 1 tablet Daily. 90 tablet 1    atorvastatin (LIPITOR) 20 MG tablet Take 1 tablet by mouth every night at bedtime. 90 tablet 1    Capsaicin 0.075 % stick Apply 1 application  topically 3 (Three) Times a Day. 1 g 5    hydroCHLOROthiazide (MICROZIDE) 12.5 MG capsule Take 1 capsule by mouth Daily. 90 capsule 1    insulin degludec (TRESIBA FLEXTOUCH) 100 UNIT/ML solution pen-injector injection Inject 10 Units under the skin into the appropriate area as directed Daily. Increase by 2 units every 2 days for sugars over 150 in the morning 3 mL 2    Insulin Pen Needle (Easy Comfort Pen Needles) 31G X 5 MM misc Apply 1 each topically Every Night. 100 each 0    ipratropium-albuterol (Combivent Respimat)  MCG/ACT inhaler Inhale 1 puff 4 (Four) Times a Day. 4 g 3    levothyroxine (SYNTHROID, LEVOTHROID) 88 MCG tablet Take 1 tablet by mouth Every Morning Before Breakfast. 90 tablet 1    losartan (COZAAR) 25 MG tablet Take 1 tablet by mouth Daily. Replaces lisinopril 90 tablet 1    metFORMIN (GLUCOPHAGE) 1000 MG tablet Take 1 tablet by mouth 2 (Two) Times a Day With Meals. 180 tablet 1    metoprolol succinate XL (TOPROL-XL) 50 MG 24 hr tablet Take  1 tablet by mouth Daily. 90 tablet 1    pantoprazole (PROTONIX) 40 MG EC tablet Take 1 tablet by mouth Daily. 90 tablet 1    tamsulosin (FLOMAX) 0.4 MG capsule 24 hr capsule Take 2 capsules by mouth every night at bedtime. 180 capsule 1    traZODone (DESYREL) 50 MG tablet Take 1 tablet by mouth Every Night. 90 tablet 1    azithromycin (Zithromax Z-Michael) 250 MG tablet Take 2 tablets by mouth on day 1, then 1 tablet daily on days 2-5 6 tablet 0     No facility-administered medications prior to visit.     Opioid medication/s are on active medication list.  and I have evaluated his active treatment plan and pain score trends (see table).  Vitals:    12/11/24 0801   PainSc:   5   PainLoc: Generalized     I have reviewed the chart for potential of high risk medication and harmful drug interactions in the elderly.        Aspirin is on active medication list. Aspirin use is indicated based on review of current medical condition/s. Pros and cons of this therapy have been discussed today. Benefits of this medication outweigh potential harm.  Patient has been encouraged to continue taking this medication.  .      Patient Active Problem List   Diagnosis    Insulin-requiring or dependent type II diabetes mellitus    Diabetic peripheral neuropathy    Gastroesophageal reflux disease with esophagitis    Hypertension    Hypothyroidism    Low back pain    Congestive cardiomyopathy    Chronic obstructive pulmonary disease    Anxiety disorder    Arthritis    CHF (congestive heart failure)    Chronic pain    Chronic pancreatitis    Depression    Osteoarthritis    Schizoaffective disorder, bipolar type    Tobacco use    Hyperlipidemia    Insomnia    Smoker    Floaters    Epiretinal membrane (ERM) of right eye    Dry eyes, bilateral    Combined form of age-related cataract, both eyes    Bilateral hypertensive retinopathy    Stage 3a chronic kidney disease    Benign prostatic hyperplasia with nocturia     Advance Care Planning Advance  "Directive is not on file.  ACP discussion was held with the patient during this visit. Patient does not have an advance directive, declines further assistance.            Objective   Vitals:    24 0801   BP: 120/72   BP Location: Left arm   Patient Position: Sitting   Cuff Size: Adult   Pulse: 74   Resp: 16   SpO2: 93%   Weight: 99.4 kg (219 lb 3.2 oz)   Height: 185.5 cm (73.03\")   PainSc:   5   PainLoc: Generalized       Estimated body mass index is 28.9 kg/m² as calculated from the following:    Height as of this encounter: 185.5 cm (73.03\").    Weight as of this encounter: 99.4 kg (219 lb 3.2 oz).            Does the patient have evidence of cognitive impairment? No                                                                                                Health  Risk Assessment    Smoking Status:  Social History     Tobacco Use   Smoking Status Every Day    Current packs/day: 2.00    Average packs/day: 2.0 packs/day for 50.0 years (100.0 ttl pk-yrs)    Types: Cigarettes    Passive exposure: Past   Smokeless Tobacco Never     Alcohol Consumption:  Social History     Substance and Sexual Activity   Alcohol Use No       Fall Risk Screen  STEADI Fall Risk Assessment was completed, and patient is at LOW risk for falls.Assessment completed on:2024    Depression Screening   Little interest or pleasure in doing things? Not at all   Feeling down, depressed, or hopeless? Not at all   PHQ-2 Total Score 0      Health Habits and Functional and Cognitive Screenin/11/2024     8:02 AM   Functional & Cognitive Status   Do you have difficulty preparing food and eating? No   Do you have difficulty bathing yourself, getting dressed or grooming yourself? No   Do you have difficulty using the toilet? No   Do you have difficulty moving around from place to place? Yes   Do you have trouble with steps or getting out of a bed or a chair? Yes   Current Diet Unhealthy Diet   Dental Exam Up to date   Eye Exam Not " up to date   Exercise (times per week) 0 times per week   Current Exercises Include No Regular Exercise   Do you need help using the phone?  No   Are you deaf or do you have serious difficulty hearing?  No   Do you need help to go to places out of walking distance? No   Do you need help shopping? No   Do you need help preparing meals?  No   Do you need help with housework?  No   Do you need help with laundry? No   Do you need help taking your medications? No   Do you need help managing money? No   Do you ever drive or ride in a car without wearing a seat belt? No   Have you felt unusual stress, anger or loneliness in the last month? No   Who do you live with? Spouse   If you need help, do you have trouble finding someone available to you? No   Have you been bothered in the last four weeks by sexual problems? Yes   Do you have difficulty concentrating, remembering or making decisions? No           Age-appropriate Screening Schedule:  Refer to the list below for future screening recommendations based on patient's age, sex and/or medical conditions. Orders for these recommended tests are listed in the plan section. The patient has been provided with a written plan.    Health Maintenance List  Health Maintenance   Topic Date Due    HEMOGLOBIN A1C  08/13/2024    DIABETIC EYE EXAM  09/13/2024    LIPID PANEL  02/13/2025    BMI FOLLOWUP  02/13/2025    LUNG CANCER SCREENING  09/13/2025    ANNUAL WELLNESS VISIT  12/11/2025    COLORECTAL CANCER SCREENING  11/14/2026    HEPATITIS C SCREENING  Completed    AAA SCREEN (ONE-TIME)  Completed    COVID-19 Vaccine  Discontinued    INFLUENZA VACCINE  Discontinued    Pneumococcal Vaccine 65+  Discontinued    URINE MICROALBUMIN  Discontinued    TDAP/TD VACCINES  Discontinued    ZOSTER VACCINE  Discontinued                                                                                                                                                CMS Preventative Services Quick  Reference  Risk Factors Identified During Encounter  Chronic Pain:  Currently follows with pain management  Immunizations Discussed/Encouraged:  Consider updated vaccines at the pharmacy  Dental Screening Recommended  Vision Screening Recommended    The above risks/problems have been discussed with the patient.  Pertinent information has been shared with the patient in the After Visit Summary.  An After Visit Summary and PPPS were made available to the patient.    Follow Up:   Next Medicare Wellness visit to be scheduled in 1 year.         Additional E&M Note during same encounter follows:  Patient has additional, significant, and separately identifiable condition(s)/problem(s) that require work above and beyond the Medicare Wellness Visit     Chief Complaint  Medicare Wellness-subsequent    Subjective   HPI  Patient presents today for subsequent Medicare wellness exam and follow-up on chronic conditions    Dental exams are not currently up-to-date  Vision exam is up-to-date  Patient is not had any falls in the last year  Depression screen with negative results  Vaccines respectfully declined  AAA screening completed July 2021  Hepatitis C screening completed 2017  Colonoscopy completed November 2023 with polyps and diverticulosis  Low-dose CT of the chest completed September 2024 with no significant pulmonary process and repeat in 1 year  Falls risk was completed by medical assistant but unfortunately did not transfer over.  Patient is at low risk for falls    Hyperlipidemia: Patient is currently on Lipitor 20 mg once nightly as well as a once daily baby aspirin.  He will occasionally miss doses    Hypertension: Patient is seen cardiology in the past.  He is currently on hydrochlorothiazide 12.5 mg, losartan 25 mg, metoprolol 50 mg.  He did not want to follow with cardiology any longer as it was starting to get too expensive.  He respectfully declined any further testing again due to expenses.  Blood pressure  "well-controlled in office    Hypothyroidism: Patient is not had his thyroid removed.  He is currently on Synthroid.  No changes to hair skin or nails    Diabetes: Patient very seldomly monitors his blood sugars.  Again this is due to a financial expense.  He is not up-to-date on his vision exam.  He does currently use insulin and metformin.  He does have burning sensation in the feet.  He does wear shoes when he is outside and does do foot checks.    GERD: Patient currently takes Protonix which controls his symptoms    He does use trazodone at night for insomnia    Patient is on Flomax for BPH and chronic prostatitis    COPD: Patient did have a recent COPD exacerbation and is still struggling with symptoms.  He is a current smoker with a pack per day for about 40 years.  No interest in quitting.  He does use his inhalers as prescribed    Patient follows with pain management, Dr. Stefan Elena.  He is on gabapentin as well as hydrocodone.  He does have chronic pain associated with arthritis in both knees.  He has had 13 surgeries on his knees.  Again he also has burning sensation in the feet.    Patient has been on Cymbalta in the past.  He states right now his mood is well-controlled.  He does have known history of anxiety, depression, schizoaffective bipolar type.  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  He ideally does not want any medications for his mood    Previous labs have noted altered kidney function leading to chronic kidney disease.              Objective   Vital Signs:  /72 (BP Location: Left arm, Patient Position: Sitting, Cuff Size: Adult)   Pulse 74   Resp 16   Ht 185.5 cm (73.03\")   Wt 99.4 kg (219 lb 3.2 oz)   SpO2 93%   BMI 28.90 kg/m²   Physical Exam  Vitals and nursing note reviewed.   Constitutional:       General: He is awake.      Appearance: Normal appearance. He is well-groomed and overweight.   HENT:      Head: Normocephalic and atraumatic.   Eyes: "      Extraocular Movements: Extraocular movements intact.      Pupils: Pupils are equal, round, and reactive to light.   Neck:      Thyroid: No thyroid mass, thyromegaly or thyroid tenderness.      Vascular: No carotid bruit.   Cardiovascular:      Rate and Rhythm: Normal rate and regular rhythm.      Pulses: Normal pulses.           Radial pulses are 2+ on the right side and 2+ on the left side.        Dorsalis pedis pulses are 2+ on the right side and 2+ on the left side.        Posterior tibial pulses are 2+ on the right side and 2+ on the left side.      Heart sounds: Normal heart sounds, S1 normal and S2 normal.   Pulmonary:      Effort: Pulmonary effort is normal.      Breath sounds: Normal breath sounds.   Abdominal:      General: Bowel sounds are normal.      Palpations: Abdomen is soft.   Musculoskeletal:         General: Normal range of motion.      Right foot: Normal range of motion. No deformity.      Left foot: Normal range of motion. No deformity.   Feet:      Right foot:      Protective Sensation: 10 sites tested.  10 sites sensed.      Skin integrity: Skin integrity normal. No skin breakdown.      Toenail Condition: Right toenails are normal.      Left foot:      Protective Sensation: 10 sites tested.  10 sites sensed.      Skin integrity: Skin integrity normal. No skin breakdown.      Toenail Condition: Left toenails are normal.      Comments: Diabetic foot exam bilaterally performed today  Monofilament test bilaterally performed today  Proprioception normal bilaterally  Skin:     General: Skin is warm and dry.   Neurological:      Mental Status: He is alert and oriented to person, place, and time.      Comments: Mental status fully intact as patient was able to provide a detailed description of the events  Gait and balance intact during today's visit   Psychiatric:         Mood and Affect: Mood normal.         Behavior: Behavior normal. Behavior is cooperative.         Thought Content: Thought  content normal.         Judgment: Judgment normal.                 Assessment and Plan Additional age appropriate preventative wellness advice topics were discussed during today's preventative wellness exam(some topics already addressed during AWV portion of the note above):   Nutrition: Discussed nutrition plan with patient. Information shared in after visit summary. Goal is for a well balanced diet to enhance overall health.     Healthy Weight: Discussed current and goal BMI with patient. Steps to attain this goal discussed. Information shared in after visit summary.              Encounter for subsequent annual wellness visit (AWV) in Medicare patient    Encounter for well adult exam without abnormal findings    At low risk for fall    Depression screen    Immunization declined    Polyp of colon, unspecified part of colon, unspecified type    Diverticulosis    Essential hypertension    Mixed hyperlipidemia     On statin therapy    Long-term use of aspirin therapy    Acquired hypothyroidism    Insulin-requiring or dependent type II diabetes mellitus    Diabetic peripheral neuropathy    Gastroesophageal reflux disease, unspecified whether esophagitis present    Insomnia, unspecified type    Chronic prostatitis    Benign prostatic hyperplasia with nocturia    Chronic obstructive pulmonary disease, unspecified COPD type    Tobacco use    Smoker    Chronic pain syndrome    Long-term use of high-risk medication    Encounter for diabetic foot exam    BMI 28.0-28.9,adult    Other mixed anxiety disorders    Schizoaffective disorder, bipolar type    Stage 3a chronic kidney disease    Screening PSA (prostate specific antigen)    HSV-2 infection      Plan  Wellness visit and follow-up on chronic conditions completed with patient today    Continue to follow with outside provider for pain management and use of controlled substances    I did encourage patient to have updated dental and vision screenings at his leisure    He  respectfully declined immunizations    Colonoscopy is up-to-date as well as low-dose CT of the chest and AAA screening    Continue to intermittently monitor blood pressures and blood sugars at home    Updated refills were sent to the pharmacy    Diabetic foot exam completed today    Labs are ordered and will be obtained today.  Patient will be notified of results    Go to ER if any condition worsens or severe    We will plan to follow-up in 6 months for chronic care and lab orders    Follow-up in 1 year for annual  wellness exam and diabetic foot exam refills and lab work      Orders Placed This Encounter   Procedures    CBC (No Diff)     Standing Status:   Future     Standing Expiration Date:   12/11/2025     Order Specific Question:   Release to patient     Answer:   Routine Release [[id number]]    Comprehensive Metabolic Panel     Standing Status:   Future     Standing Expiration Date:   12/11/2025     Order Specific Question:   Release to patient     Answer:   Routine Release [[id number]]    Lipid Panel     Standing Status:   Future     Standing Expiration Date:   12/11/2025     Order Specific Question:   Release to patient     Answer:   Routine Release [[id number]]    Urinalysis With Culture If Indicated -     Standing Status:   Future     Standing Expiration Date:   12/11/2025     Order Specific Question:   Release to patient     Answer:   Routine Release [[id number]]    MicroAlbumin, Urine, Random - Urine, Clean Catch     Standing Status:   Future     Standing Expiration Date:   12/11/2025     Order Specific Question:   Release to patient     Answer:   Routine Release [[id number]]    Hemoglobin A1c     Standing Status:   Future     Standing Expiration Date:   12/11/2025     Order Specific Question:   Release to patient     Answer:   Routine Release [[id number]]    TSH Rfx On Abnormal To Free T4     Standing Status:   Future     Standing Expiration Date:   12/11/2025     Order Specific Question:   Release to  patient     Answer:   Routine Release [[id number]]    PSA Screen     Standing Status:   Future     Order Specific Question:   Release to patient     Answer:   Routine Release [[id number]]     New Medications Ordered This Visit   Medications    acyclovir (ZOVIRAX) 400 MG tablet     Sig: Take 1 tablet by mouth 2 (Two) Times a Day. Take no more than 5 doses a day.     Dispense:  180 tablet     Refill:  3    albuterol sulfate  (90 Base) MCG/ACT inhaler     Sig: Inhale 2 puffs Every 4 (Four) Hours As Needed for Wheezing or Shortness of Air.     Dispense:  18 g     Refill:  1    aspirin 81 MG chewable tablet     Sig: Chew 1 tablet Daily.     Dispense:  90 tablet     Refill:  3    atorvastatin (LIPITOR) 20 MG tablet     Sig: Take 1 tablet by mouth every night at bedtime.     Dispense:  90 tablet     Refill:  3    hydroCHLOROthiazide (MICROZIDE) 12.5 MG capsule     Sig: Take 1 capsule by mouth Daily.     Dispense:  90 capsule     Refill:  3    insulin degludec (TRESIBA FLEXTOUCH) 100 UNIT/ML solution pen-injector injection     Sig: Inject 10 Units under the skin into the appropriate area as directed Daily. Increase by 2 units every 2 days for sugars over 150 in the morning     Dispense:  3 mL     Refill:  11    Insulin Pen Needle (Easy Comfort Pen Needles) 31G X 5 MM misc     Sig: Apply 1 each topically Every Night.     Dispense:  100 each     Refill:  11    ipratropium-albuterol (Combivent Respimat)  MCG/ACT inhaler     Sig: Inhale 1 puff 4 (Four) Times a Day.     Dispense:  4 g     Refill:  5    levothyroxine (SYNTHROID, LEVOTHROID) 88 MCG tablet     Sig: Take 1 tablet by mouth Every Morning Before Breakfast.     Dispense:  90 tablet     Refill:  3    losartan (COZAAR) 25 MG tablet     Sig: Take 1 tablet by mouth Daily. Replaces lisinopril     Dispense:  90 tablet     Refill:  3    metFORMIN (GLUCOPHAGE) 1000 MG tablet     Sig: Take 1 tablet by mouth 2 (Two) Times a Day With Meals.     Dispense:  180 tablet      Refill:  3    metoprolol succinate XL (TOPROL-XL) 50 MG 24 hr tablet     Sig: Take 1 tablet by mouth Daily.     Dispense:  90 tablet     Refill:  3    pantoprazole (PROTONIX) 40 MG EC tablet     Sig: Take 1 tablet by mouth Daily.     Dispense:  90 tablet     Refill:  3    tamsulosin (FLOMAX) 0.4 MG capsule 24 hr capsule     Sig: Take 2 capsules by mouth every night at bedtime.     Dispense:  180 capsule     Refill:  3    traZODone (DESYREL) 50 MG tablet     Sig: Take 1 tablet by mouth Every Night.     Dispense:  90 tablet     Refill:  3        I spent 30 minutes caring for Carmelo on this date of service. This time includes time spent by me in the following activities:preparing for the visit, reviewing tests, obtaining and/or reviewing a separately obtained history, performing a medically appropriate examination and/or evaluation , counseling and educating the patient/family/caregiver, ordering medications, tests, or procedures, referring and communicating with other health care professionals , and documenting information in the medical record  Follow Up   Return for  chronic care. 1 year wellness and dm foot exam .  Patient was given instructions and counseling regarding his condition or for health maintenance advice. Please see specific information pulled into the AVS if appropriate.

## 2024-12-12 ENCOUNTER — TELEPHONE (OUTPATIENT)
Dept: INTERNAL MEDICINE | Facility: CLINIC | Age: 64
End: 2024-12-12
Payer: MEDICARE

## 2024-12-12 NOTE — TELEPHONE ENCOUNTER
Update received from patient and staff. Will plan to discuss at next visit regarding changes to medication

## 2024-12-12 NOTE — TELEPHONE ENCOUNTER
Called and spoke to pt, gave message from provider. Pt voiced understanding and appreciation. Pt scheduled followup apt with provider for 1/20/25 to discuss alt. Diabetes medication.     ----- Message from Dianne Rea sent at 12/12/2024  8:27 AM EST -----  Please let patient know labs resulted.  His A1c did go up just slightly.  He does continue to have some altered kidney function.  I wanted to offer him an alternative.  He could come in for a sooner appointment before June and we could consider putting him on some different diabetes medications instead of insulin and metformin.  Would he like to set up a sooner appointment for us to discuss this?

## 2025-01-13 DIAGNOSIS — E11.9 INSULIN-REQUIRING OR DEPENDENT TYPE II DIABETES MELLITUS: ICD-10-CM

## 2025-01-13 DIAGNOSIS — Z79.4 INSULIN-REQUIRING OR DEPENDENT TYPE II DIABETES MELLITUS: ICD-10-CM

## 2025-01-13 RX ORDER — ALCOHOL ANTISEPTIC PADS
1 PADS, MEDICATED (EA) TOPICAL NIGHTLY
Qty: 100 EACH | Refills: 11 | OUTPATIENT
Start: 2025-01-13

## 2025-01-13 NOTE — TELEPHONE ENCOUNTER
Caller: Carmelo Saucedo    Relationship: Self    Best call back number: [phone]    Requested Prescriptions:   Requested Prescriptions     Pending Prescriptions Disp Refills    Insulin Pen Needle (Easy Comfort Pen Needles) 31G X 5 MM misc 100 each 11     Sig: Apply 1 each topically Every Night.        Pharmacy where request should be sent: [facility] [address] - [phone] [facility] [phone] FX     Last office visit with prescribing clinician: 12/11/2024   Last telemedicine visit with prescribing clinician: Visit date not found   Next office visit with prescribing clinician: 1/20/2025         Does the patient have less than a 3 day supply:  [] Yes  [x] No      Manjeet Carrasquillo Rep   01/13/25 08:57 EST

## 2025-01-20 ENCOUNTER — OFFICE VISIT (OUTPATIENT)
Dept: INTERNAL MEDICINE | Facility: CLINIC | Age: 65
End: 2025-01-20
Payer: MEDICARE

## 2025-01-20 VITALS
HEIGHT: 73 IN | BODY MASS INDEX: 29.53 KG/M2 | HEART RATE: 68 BPM | SYSTOLIC BLOOD PRESSURE: 116 MMHG | DIASTOLIC BLOOD PRESSURE: 76 MMHG | TEMPERATURE: 98 F | OXYGEN SATURATION: 96 % | WEIGHT: 222.8 LBS

## 2025-01-20 DIAGNOSIS — Z09 ENCOUNTER FOR FOLLOW-UP: Primary | ICD-10-CM

## 2025-01-20 DIAGNOSIS — Z79.4 INSULIN-REQUIRING OR DEPENDENT TYPE II DIABETES MELLITUS: ICD-10-CM

## 2025-01-20 DIAGNOSIS — N18.32 STAGE 3B CHRONIC KIDNEY DISEASE: ICD-10-CM

## 2025-01-20 DIAGNOSIS — E11.9 INSULIN-REQUIRING OR DEPENDENT TYPE II DIABETES MELLITUS: ICD-10-CM

## 2025-01-20 PROCEDURE — 1159F MED LIST DOCD IN RCRD: CPT | Performed by: NURSE PRACTITIONER

## 2025-01-20 PROCEDURE — 3074F SYST BP LT 130 MM HG: CPT | Performed by: NURSE PRACTITIONER

## 2025-01-20 PROCEDURE — 1160F RVW MEDS BY RX/DR IN RCRD: CPT | Performed by: NURSE PRACTITIONER

## 2025-01-20 PROCEDURE — G2211 COMPLEX E/M VISIT ADD ON: HCPCS | Performed by: NURSE PRACTITIONER

## 2025-01-20 PROCEDURE — 3078F DIAST BP <80 MM HG: CPT | Performed by: NURSE PRACTITIONER

## 2025-01-20 PROCEDURE — 1125F AMNT PAIN NOTED PAIN PRSNT: CPT | Performed by: NURSE PRACTITIONER

## 2025-01-20 PROCEDURE — 99214 OFFICE O/P EST MOD 30 MIN: CPT | Performed by: NURSE PRACTITIONER

## 2025-01-20 NOTE — PROGRESS NOTES
Office Note     Name: Carmelo Saucedo    : 1960     MRN: [id number]     Chief Complaint  Follow-up (Patient here for follow up possible medication change. Kidney function decreased. Patient stated his sugar was 170 this morning when he checked it. )    Subjective     History of Present Illness:  Carmelo Saucedo is a 64 y.o. male who presents today for further discussion about his diabetes and chronic kidney disease.    Patient had labs obtained in December.  It was noted that his A1c had gone up slightly and he had continued altered kidney function.  Patient is currently on insulin as well as metformin.  Blood sugar this morning was 170 when checked.  Patient is currently on 7 units of insulin.  He is taking 1000 mg of metformin twice daily.  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  Patient states he drinks mainly soda and knows he does not drink enough water        Past Medical History:   Diagnosis Date    Abnormal EKG     Angular cheilitis     Arthritis     Balanitis 2017    Improved on diflucan and nystatin.      Benign prostatic hypertrophy     Bipolar disorder     Chest pain     Chronic pain     COPD (chronic obstructive pulmonary disease)     Cyst, eyelid, left 2021    Enlarged prostate without lower urinary tract symptoms (luts)     Esophageal reflux     Floppy eyelid syndrome     H/O tobacco use, presenting hazards to health     Hematochezia     Herpes simplex infection     Hyperlipidemia     Hypertension     Insomnia     Lower back pain     Migraine without status migrainosus, not intractable     Nephrolithiasis     Peptic ulcer     Pulmonary nodule     Rectal polyp     Rectal polyp     Sialoadenitis     Skin ulcer of neck     Uncontrolled type 2 diabetes mellitus with complication        Past Surgical History:   Procedure Laterality Date    ADENOIDECTOMY      CHOLECYSTECTOMY      GALLBLADDER SURGERY      KNEE SURGERY  Bilateral     release x5    SKIN CANCER DESTRUCTION      TONSILLECTOMY         Social History     Socioeconomic History    Marital status:    Tobacco Use    Smoking status: Every Day     Current packs/day: 2.00     Average packs/day: 2.0 packs/day for 50.0 years (100.0 ttl pk-yrs)     Types: Cigarettes     Passive exposure: Past    Smokeless tobacco: Never   Vaping Use    Vaping status: Never Used   Substance and Sexual Activity    Alcohol use: No    Drug use: No    Sexual activity: Defer         Current Outpatient Medications:     acyclovir (ZOVIRAX) 400 MG tablet, Take 1 tablet by mouth 2 (Two) Times a Day. Take no more than 5 doses a day., Disp: 180 tablet, Rfl: 3    albuterol sulfate  (90 Base) MCG/ACT inhaler, Inhale 2 puffs Every 4 (Four) Hours As Needed for Wheezing or Shortness of Air., Disp: 18 g, Rfl: 1    aspirin 81 MG chewable tablet, Chew 1 tablet Daily., Disp: 90 tablet, Rfl: 3    atorvastatin (LIPITOR) 20 MG tablet, Take 1 tablet by mouth every night at bedtime., Disp: 90 tablet, Rfl: 3    gabapentin (NEURONTIN) 600 MG tablet, Take 1 tablet by mouth Every 6 (Six) Hours., Disp: , Rfl:     glucose blood test strip, Use as instructed to check blood sugar three times daily, Disp: 200 each, Rfl: 6    glucose monitor monitoring kit, 1 each Daily. Patient requests One Touch Glucose Meter.  Please substitute covered meter per insurance if needed., Disp: 1 each, Rfl: 0    hydroCHLOROthiazide (MICROZIDE) 12.5 MG capsule, Take 1 capsule by mouth Daily., Disp: 90 capsule, Rfl: 3    HYDROcodone-acetaminophen (NORCO) 7.5-325 MG per tablet, Take 1 tablet by mouth 3 (Three) Times a Day., Disp: , Rfl: 0    insulin detemir (Levemir FlexPen) 100 UNIT/ML injection, Inject 10 Units under the skin into the appropriate area as directed Every Night. (Patient taking differently: Inject 8 Units under the skin into the appropriate area as directed Every Night.), Disp: 9 mL, Rfl: 3    Insulin Pen Needle (Easy  "Comfort Pen Needles) 31G X 5 MM misc, Apply 1 each topically Every Night., Disp: 100 each, Rfl: 11    ipratropium-albuterol (Combivent Respimat)  MCG/ACT inhaler, Inhale 1 puff 4 (Four) Times a Day., Disp: 4 g, Rfl: 5    ipratropium-albuterol (DUONEB) 0.5-2.5 mg/3 ml nebulizer, Take 3 mL by nebulization Every 6 (Six) Hours., Disp: 120 vial, Rfl: 3    levothyroxine (SYNTHROID, LEVOTHROID) 88 MCG tablet, Take 1 tablet by mouth Every Morning Before Breakfast., Disp: 90 tablet, Rfl: 3    losartan (COZAAR) 25 MG tablet, Take 1 tablet by mouth Daily. Replaces lisinopril, Disp: 90 tablet, Rfl: 3    metFORMIN (GLUCOPHAGE) 1000 MG tablet, Take 1 tablet by mouth 2 (Two) Times a Day With Meals., Disp: 180 tablet, Rfl: 3    metoprolol succinate XL (TOPROL-XL) 50 MG 24 hr tablet, Take 1 tablet by mouth Daily., Disp: 90 tablet, Rfl: 3    oxymetazoline (Afrin Nasal Spray) 0.05 % nasal spray, Administer 2 sprays into the nostril(s) as directed by provider 2 (Two) Times a Day., Disp: 30 mL, Rfl: 0    pantoprazole (PROTONIX) 40 MG EC tablet, Take 1 tablet by mouth Daily., Disp: 90 tablet, Rfl: 3    sildenafil (REVATIO) 20 MG tablet, TAKE TWO TO THREE TABLETS BY MOUTH ONE HOUR PRIOR TO INTERCOURSE, Disp: 10 tablet, Rfl: 0    tamsulosin (FLOMAX) 0.4 MG capsule 24 hr capsule, Take 2 capsules by mouth every night at bedtime., Disp: 180 capsule, Rfl: 3    traZODone (DESYREL) 50 MG tablet, Take 1 tablet by mouth Every Night., Disp: 90 tablet, Rfl: 3    empagliflozin (Jardiance) 10 MG tablet tablet, Take 1 tablet by mouth Daily., Disp: 30 tablet, Rfl: 0    Objective     Vital Signs  /76 (BP Location: Left arm, Patient Position: Sitting, Cuff Size: Adult)   Pulse 68   Temp 98 °F (36.7 °C)   Ht 185.4 cm (73\")   Wt 101 kg (222 lb 12.8 oz)   SpO2 96%   BMI 29.39 kg/m²   Estimated body mass index is 29.39 kg/m² as calculated from the following:    Height as of this encounter: 185.4 cm (73\").    Weight as of this encounter: 101 " kg (222 lb 12.8 oz).             Physical Exam  Vitals and nursing note reviewed.   Constitutional:       Appearance: Normal appearance.   HENT:      Head: Normocephalic and atraumatic.   Eyes:      Extraocular Movements: Extraocular movements intact.      Pupils: Pupils are equal, round, and reactive to light.   Cardiovascular:      Rate and Rhythm: Normal rate and regular rhythm.   Pulmonary:      Effort: Pulmonary effort is normal.   Musculoskeletal:         General: Normal range of motion.   Skin:     General: Skin is warm and dry.   Neurological:      Mental Status: He is alert and oriented to person, place, and time.   Psychiatric:         Mood and Affect: Mood normal.         Behavior: Behavior normal.                 Assessment and Plan     Diagnoses and all orders for this visit:    1. Encounter for follow-up (Primary)    2. Insulin-requiring or dependent type II diabetes mellitus  -     empagliflozin (Jardiance) 10 MG tablet tablet; Take 1 tablet by mouth Daily.  Dispense: 30 tablet; Refill: 0  -     Comprehensive Metabolic Panel; Future    3. Stage 3b chronic kidney disease  -     empagliflozin (Jardiance) 10 MG tablet tablet; Take 1 tablet by mouth Daily.  Dispense: 30 tablet; Refill: 0  -     Comprehensive Metabolic Panel; Future    Plan  Follow-up visit completed with patient today    We did review his lab work from previous visit.  Discussed with patient that his A1c did go up a little bit but it is still in the 7 range.  We will continue with current dose of insulin and metformin.  I would highly encourage him to increase his water intake.  This will assist with your kidney function.  My overall concern is to preserve his kidney function.  Patient will be started on Jardiance 10 mg once daily.  Will send in a short prescription to the pharmacy.  Patient will keep me updated about cost.  We may consider Farxiga if needed in the future.  We will gradually increase the dose.  We did discuss side effects  of medication.    Go to ER if any condition worsens or severe    Plan to follow-up as scheduled    Follow Up  Return for Next scheduled follow up.    RADHA Szymanski    Part of this note may be an electronic transcription/translation of spoken language to printed text using the Dragon Dictation System.

## 2025-01-28 ENCOUNTER — TELEPHONE (OUTPATIENT)
Dept: INTERNAL MEDICINE | Facility: CLINIC | Age: 65
End: 2025-01-28
Payer: MEDICARE

## 2025-01-28 DIAGNOSIS — B37.9 YEAST INFECTION: Primary | ICD-10-CM

## 2025-01-28 RX ORDER — FLUCONAZOLE 150 MG/1
150 TABLET ORAL ONCE
Qty: 1 TABLET | Refills: 1 | Status: SHIPPED | OUTPATIENT
Start: 2025-01-28 | End: 2025-01-28

## 2025-01-28 NOTE — TELEPHONE ENCOUNTER
Caller: Carmelo Saucedo    Relationship: Self    Best call back number: [phone]     What is the best time to reach you: ANY    Who are you requesting to speak with (clinical staff, provider,  specific staff member): CLINICAL    Do you know the name of the person who called: SELF    What was the call regarding: PATIENT WANTS TO DISCUSS REACTIONS HE IS HAVING TO JARDIANCE.     Is it okay if the provider responds through MyChart: NO

## 2025-01-28 NOTE — TELEPHONE ENCOUNTER
Patient started taking Jardiance a week ago  Yesterday a yeast infection showed up on privates   It is red and itchy and burning  Patient had forgotten to mention to provider that had previously tried taking a similar medication that resulted in a yeast infection. The previous yeast infection had to be treated with a oral medication due to a cream not working.           Caller: Carmelo Saucedo    Relationship: Self    Best call back number: [phone]     What is the best time to reach you: ANY    Who are you requesting to speak with (clinical staff, provider,  specific staff member): CLINICAL    Do you know the name of the person who called: SELF    What was the call regarding: PATIENT WANTS TO DISCUSS REACTIONS HE IS HAVING TO JARDIANCE.     Is it okay if the provider responds through MyChart: NO

## 2025-02-10 ENCOUNTER — TELEPHONE (OUTPATIENT)
Dept: INTERNAL MEDICINE | Facility: CLINIC | Age: 65
End: 2025-02-10
Payer: MEDICARE

## 2025-02-10 NOTE — TELEPHONE ENCOUNTER
Notified patient that we needed to reschedule appt from 6/11. Patient verbalized understanding and rescheduled

## 2025-02-13 ENCOUNTER — TELEPHONE (OUTPATIENT)
Dept: INTERNAL MEDICINE | Facility: CLINIC | Age: 65
End: 2025-02-13
Payer: MEDICARE

## 2025-02-13 NOTE — TELEPHONE ENCOUNTER
Caller: Carmelo Saucedo    Relationship: Self    Best call back number: [phone]     What was the call regarding: PATIENT HAS BEEN EXPOSED TO RINGWORM, HAS SPOTS ON BACK AND CHEST THAT LOOK SIMILAR TO WHAT HIS SON HAS BEEN DIAGNOSED WITH. PATIENT IS ASKING WHAT HE CAN USE TO TREAT THE SPOTS. NO MYCHART. CALL ONLY.    Is it okay if the provider responds through MyChart: NO    [facility] - [facility] - [address] [facility] [phone] [facility] [phone] FX

## 2025-03-06 NOTE — TELEPHONE ENCOUNTER
He may have to change his meter, if he just got the strips for the Ultra,, as insurance will not pay for more strips.    Also we just faxed over diabetic supplies- test strips to [facility] yesterday.  He can check with them and get the right strips or meter.    thanks   [FreeTextEntry1] :  21-year-old female w/ PMHX seasonal allergies, food allergies, PTSD, dysthymia vs MDD presents for evaluation to rule out seizures. Episodes of transient LOC with no confusion more suspicious for syncope, but will do further work up  plan: MRI brain w/wout contrast 48HR ambulatory EEG Cardiology referral F/u in 3-4 months

## 2025-03-18 DIAGNOSIS — E11.69 TYPE 2 DIABETES MELLITUS WITH OTHER SPECIFIED COMPLICATION, WITHOUT LONG-TERM CURRENT USE OF INSULIN: ICD-10-CM

## 2025-03-18 RX ORDER — INSULIN DETEMIR 100 [IU]/ML
10 INJECTION, SOLUTION SUBCUTANEOUS NIGHTLY
Qty: 9 ML | Refills: 0 | Status: SHIPPED | OUTPATIENT
Start: 2025-03-18

## 2025-03-18 NOTE — TELEPHONE ENCOUNTER
Caller: Carmelo Saucedo    Relationship: Self    Best call back number: [phone]     Requested Prescriptions:   Requested Prescriptions     Pending Prescriptions Disp Refills    insulin detemir (Levemir FlexPen) 100 UNIT/ML injection 9 mL 3     Sig: Inject 10 Units under the skin into the appropriate area as directed Every Night.        Pharmacy where request should be sent: [facility] - [facility] - [address] [facility] - [phone]  - [phone] FX     Last office visit with prescribing clinician: 1/20/2025   Last telemedicine visit with prescribing clinician: Visit date not found   Next office visit with prescribing clinician: 6/16/2025     Additional details provided by patient: 90 DAY SUPPLY REQUESTED.    Does the patient have less than a 3 day supply:  [] Yes  [x] No    Would you like a call back once the refill request has been completed: [] Yes [] No    If the office needs to give you a call back, can they leave a voicemail: [] Yes [] No    Manjeet Cochran Rep   03/18/25 10:11 EDT

## 2025-03-18 NOTE — TELEPHONE ENCOUNTER
Last appointment: 1/20/2025  Next appointment: 6/16/2025     Last Refill: 6/12/2023 filled by Karlene Humphries

## 2025-07-16 ENCOUNTER — OFFICE VISIT (OUTPATIENT)
Dept: INTERNAL MEDICINE | Facility: CLINIC | Age: 65
End: 2025-07-16
Payer: MEDICARE

## 2025-07-16 VITALS
HEIGHT: 73 IN | RESPIRATION RATE: 16 BRPM | WEIGHT: 220 LBS | DIASTOLIC BLOOD PRESSURE: 62 MMHG | BODY MASS INDEX: 29.16 KG/M2 | SYSTOLIC BLOOD PRESSURE: 104 MMHG | HEART RATE: 80 BPM | OXYGEN SATURATION: 96 %

## 2025-07-16 DIAGNOSIS — N40.1 BENIGN PROSTATIC HYPERPLASIA WITH NOCTURIA: ICD-10-CM

## 2025-07-16 DIAGNOSIS — R35.1 BENIGN PROSTATIC HYPERPLASIA WITH NOCTURIA: ICD-10-CM

## 2025-07-16 DIAGNOSIS — R35.0 URINARY FREQUENCY: ICD-10-CM

## 2025-07-16 DIAGNOSIS — N41.0 ACUTE PROSTATITIS: Primary | ICD-10-CM

## 2025-07-16 LAB
BILIRUB BLD-MCNC: NEGATIVE MG/DL
CLARITY, POC: ABNORMAL
COLOR UR: YELLOW
EXPIRATION DATE: ABNORMAL
GLUCOSE UR STRIP-MCNC: NEGATIVE MG/DL
KETONES UR QL: NEGATIVE
LEUKOCYTE EST, POC: NEGATIVE
Lab: ABNORMAL
NITRITE UR-MCNC: NEGATIVE MG/ML
PH UR: 6 [PH] (ref 5–8)
PROT UR STRIP-MCNC: NEGATIVE MG/DL
RBC # UR STRIP: NEGATIVE /UL
SP GR UR: 1.01 (ref 1–1.03)
UROBILINOGEN UR QL: NORMAL

## 2025-07-16 PROCEDURE — 3078F DIAST BP <80 MM HG: CPT | Performed by: FAMILY MEDICINE

## 2025-07-16 PROCEDURE — 3074F SYST BP LT 130 MM HG: CPT | Performed by: FAMILY MEDICINE

## 2025-07-16 PROCEDURE — 81003 URINALYSIS AUTO W/O SCOPE: CPT | Performed by: FAMILY MEDICINE

## 2025-07-16 PROCEDURE — 3044F HG A1C LEVEL LT 7.0%: CPT | Performed by: FAMILY MEDICINE

## 2025-07-16 PROCEDURE — 87086 URINE CULTURE/COLONY COUNT: CPT | Performed by: FAMILY MEDICINE

## 2025-07-16 PROCEDURE — 1126F AMNT PAIN NOTED NONE PRSNT: CPT | Performed by: FAMILY MEDICINE

## 2025-07-16 PROCEDURE — 99214 OFFICE O/P EST MOD 30 MIN: CPT | Performed by: FAMILY MEDICINE

## 2025-07-16 RX ORDER — SULFAMETHOXAZOLE AND TRIMETHOPRIM 800; 160 MG/1; MG/1
1 TABLET ORAL 2 TIMES DAILY
Qty: 28 TABLET | Refills: 0 | Status: SHIPPED | OUTPATIENT
Start: 2025-07-16 | End: 2025-07-30

## 2025-07-16 NOTE — PATIENT INSTRUCTIONS
Diagnosis Discussed   Continue to monitor   Plenty of fluids  Urine culture for further evaluation   Follow up Urology as directed   Please complete full course of antibiotics   If symptoms worsen or persist please seek further evaluation

## 2025-07-16 NOTE — PROGRESS NOTES
Office Note     Name: Carmelo Saucedo    : 1960     MRN: [id number]     Chief Complaint  Urinary Frequency (Pt states has prostate issues, during day hrs no issues, at night states feels like having to urinate but not much production)    Subjective     History of Present Illness:  Carmelo Saucedo is a 64 y.o. male who presents today for urinary frequency- nocturia   BPH- has experienced more dribbling   Denies issues during day   Has not been to urology   Mild pain and or discomfort   Denies fevers, chills, sob, chest pain     Previous hx of kidney stones     Last PSA 3.4  Prior to that 3.6    Is drinking plenty of water   Has cut down on sodas     Scheduled follow up with PCP- recheck kidney function and A1c     Review of Systems:   Review of Systems   Constitutional:  Negative for chills and fever.   Respiratory:  Negative for cough and shortness of breath.    Cardiovascular:  Negative for chest pain and palpitations.   Gastrointestinal:  Negative for abdominal pain, nausea and vomiting.   Genitourinary:  Positive for difficulty urinating, frequency and nocturia.       Past Medical History:   Past Medical History:   Diagnosis Date    Abnormal EKG     Angular cheilitis     Arthritis     Balanitis 2017    Improved on diflucan and nystatin.      Benign prostatic hypertrophy     Bipolar disorder     Chest pain     Chronic pain     COPD (chronic obstructive pulmonary disease)     Cyst, eyelid, left 2021    Enlarged prostate without lower urinary tract symptoms (luts)     Esophageal reflux     Floppy eyelid syndrome 2018    H/O tobacco use, presenting hazards to health 2017    Hematochezia     Herpes simplex infection     Hyperlipidemia     Hypertension     Insomnia     Lower back pain     Migraine without status migrainosus, not intractable 2017    Nephrolithiasis     Peptic ulcer     Pulmonary nodule     Rectal polyp     Rectal polyp     Sialoadenitis     Skin ulcer of neck      Uncontrolled type 2 diabetes mellitus with complication        Past Surgical History:   Past Surgical History:   Procedure Laterality Date    ADENOIDECTOMY      CHOLECYSTECTOMY      GALLBLADDER SURGERY      KNEE SURGERY Bilateral     release x5    SKIN CANCER DESTRUCTION      TONSILLECTOMY         Immunizations:   Immunization History   Administered Date(s) Administered    COVID-19 (MODERNA) 1st,2nd,3rd Dose Monovalent 03/30/2021, 04/26/2021, 10/22/2021    COVID-19 (PFIZER) BIVALENT 12+YRS 01/13/2023    Flu Vaccine Intradermal Quad 18-64YR 11/12/2015    FluMist 2-49yrs 10/12/2015    Fluzone  >6mos 10/13/2016, 10/13/2016, 08/11/2017, 08/11/2017    Fluzone (or Fluarix & Flulaval for VFC) >6mos 11/11/2019, 10/22/2021    Fluzone High-Dose 65+YRS 02/09/2025    Fluzone Quad >6mos (Multi-dose) 10/13/2016, 08/11/2017    Pneumococcal Conjugate 13-Valent (PCV13) 11/30/2015    Pneumococcal Polysaccharide (PPSV23) 09/23/2011, 05/02/2017    Pneumococcal, Unspecified 09/23/2011, 11/30/2015    Tdap 12/23/2020, 12/23/2020        Medications:     Current Outpatient Medications:     acyclovir (ZOVIRAX) 400 MG tablet, Take 1 tablet by mouth 2 (Two) Times a Day. Take no more than 5 doses a day., Disp: 180 tablet, Rfl: 3    albuterol sulfate  (90 Base) MCG/ACT inhaler, Inhale 2 puffs Every 4 (Four) Hours As Needed for Wheezing or Shortness of Air., Disp: 18 g, Rfl: 1    aspirin 81 MG chewable tablet, Chew 1 tablet Daily. (Patient not taking: Reported on 7/21/2025), Disp: 90 tablet, Rfl: 3    atorvastatin (LIPITOR) 20 MG tablet, Take 1 tablet by mouth every night at bedtime., Disp: 90 tablet, Rfl: 3    gabapentin (NEURONTIN) 600 MG tablet, Take 1 tablet by mouth Every 6 (Six) Hours., Disp: , Rfl:     glucose blood test strip, Use as instructed to check blood sugar three times daily, Disp: 200 each, Rfl: 6    glucose monitor monitoring kit, 1 each Daily. Patient requests One Touch Glucose Meter.  Please substitute covered meter per  insurance if needed., Disp: 1 each, Rfl: 0    hydroCHLOROthiazide (MICROZIDE) 12.5 MG capsule, Take 1 capsule by mouth Daily., Disp: 90 capsule, Rfl: 3    HYDROcodone-acetaminophen (NORCO) 7.5-325 MG per tablet, Take 1 tablet by mouth 3 (Three) Times a Day., Disp: , Rfl: 0    insulin detemir (Levemir FlexPen) 100 UNIT/ML injection, Inject 10 Units under the skin into the appropriate area as directed Every Night., Disp: 9 mL, Rfl: 0    Insulin Pen Needle (Easy Comfort Pen Needles) 31G X 5 MM misc, Apply 1 each topically Every Night., Disp: 100 each, Rfl: 11    ipratropium-albuterol (Combivent Respimat)  MCG/ACT inhaler, Inhale 1 puff 4 (Four) Times a Day., Disp: 4 g, Rfl: 5    levothyroxine (SYNTHROID, LEVOTHROID) 88 MCG tablet, Take 1 tablet by mouth Every Morning Before Breakfast., Disp: 90 tablet, Rfl: 3    losartan (COZAAR) 25 MG tablet, Take 1 tablet by mouth Daily. Replaces lisinopril, Disp: 90 tablet, Rfl: 3    metFORMIN (GLUCOPHAGE) 1000 MG tablet, Take 1 tablet by mouth 2 (Two) Times a Day With Meals., Disp: 180 tablet, Rfl: 3    metoprolol succinate XL (TOPROL-XL) 50 MG 24 hr tablet, Take 1 tablet by mouth Daily., Disp: 90 tablet, Rfl: 3    pantoprazole (PROTONIX) 40 MG EC tablet, Take 1 tablet by mouth Daily., Disp: 90 tablet, Rfl: 3    tamsulosin (FLOMAX) 0.4 MG capsule 24 hr capsule, Take 2 capsules by mouth every night at bedtime., Disp: 180 capsule, Rfl: 3    traZODone (DESYREL) 50 MG tablet, Take 1 tablet by mouth Every Night., Disp: 90 tablet, Rfl: 3    sulfamethoxazole-trimethoprim (Bactrim DS) 800-160 MG per tablet, Take 1 tablet by mouth 2 (Two) Times a Day for 14 days., Disp: 28 tablet, Rfl: 0    Allergies:   Allergies   Allergen Reactions    Lithium Other (See Comments) and Delirium     History of lithium tox and cannot take this medication    Lisinopril Cough       Family History:   Family History   Problem Relation Age of Onset    Arthritis Mother     Diabetes Mother     Hypertension  "Mother     Cancer Mother     Thyroid disease Mother     Heart attack Mother     Arthritis Father     Diabetes Father     Hypertension Father     Cancer Father     Thyroid disease Father        Social History:   Social History     Socioeconomic History    Marital status:    Tobacco Use    Smoking status: Every Day     Current packs/day: 2.00     Average packs/day: 2.0 packs/day for 50.0 years (100.0 ttl pk-yrs)     Types: Cigarettes     Passive exposure: Past    Smokeless tobacco: Never   Vaping Use    Vaping status: Never Used   Substance and Sexual Activity    Alcohol use: No    Drug use: No    Sexual activity: Defer         Objective     Vital Signs  /62   Pulse 80   Resp 16   Ht 185.4 cm (73\")   Wt 99.8 kg (220 lb)   SpO2 96%   BMI 29.03 kg/m²   Estimated body mass index is 29.03 kg/m² as calculated from the following:    Height as of this encounter: 185.4 cm (73\").    Weight as of this encounter: 99.8 kg (220 lb).          Physical Exam  Vitals and nursing note reviewed.   Constitutional:       General: He is not in acute distress.     Appearance: Normal appearance.   HENT:      Head: Normocephalic and atraumatic.   Cardiovascular:      Rate and Rhythm: Normal rate and regular rhythm.      Heart sounds: Normal heart sounds.   Pulmonary:      Effort: Pulmonary effort is normal.      Breath sounds: Normal breath sounds.   Abdominal:      General: Bowel sounds are normal.      Palpations: Abdomen is soft.   Musculoskeletal:         General: Normal range of motion.   Skin:     General: Skin is warm and dry.   Neurological:      Mental Status: He is alert.          Procedures     Assessment and Plan   Diagnosis Discussed   Continue to monitor   Plenty of fluids  Urine culture for further evaluation   Follow up Urology as directed   Please complete full course of antibiotics   If symptoms worsen or persist please seek further evaluation     1. Acute prostatitis  - Ambulatory Referral to Urology  - " sulfamethoxazole-trimethoprim (Bactrim DS) 800-160 MG per tablet; Take 1 tablet by mouth 2 (Two) Times a Day for 14 days.  Dispense: 28 tablet; Refill: 0    2. Urinary frequency  - POCT urinalysis dipstick, automated  - Urine Culture - Urine, Urine, Clean Catch; Future  - Ambulatory Referral to Urology    3. Benign prostatic hyperplasia with nocturia  - Ambulatory Referral to Urology       Follow Up  No follow-ups on file.    Marcy Oh MD  [facility] INTERNAL [facility]ICINE  [address] [place] [id number] [phone]

## 2025-07-18 LAB — BACTERIA SPEC AEROBE CULT: NO GROWTH

## 2025-07-21 ENCOUNTER — OFFICE VISIT (OUTPATIENT)
Dept: INTERNAL MEDICINE | Facility: CLINIC | Age: 65
End: 2025-07-21
Payer: MEDICARE

## 2025-07-21 ENCOUNTER — LAB (OUTPATIENT)
Dept: LAB | Facility: HOSPITAL | Age: 65
End: 2025-07-21
Payer: MEDICARE

## 2025-07-21 VITALS
DIASTOLIC BLOOD PRESSURE: 68 MMHG | HEIGHT: 73 IN | WEIGHT: 194.8 LBS | SYSTOLIC BLOOD PRESSURE: 102 MMHG | OXYGEN SATURATION: 96 % | HEART RATE: 73 BPM | BODY MASS INDEX: 25.82 KG/M2 | TEMPERATURE: 98 F

## 2025-07-21 DIAGNOSIS — Z79.4 INSULIN-REQUIRING OR DEPENDENT TYPE II DIABETES MELLITUS: ICD-10-CM

## 2025-07-21 DIAGNOSIS — J44.9 CHRONIC OBSTRUCTIVE PULMONARY DISEASE, UNSPECIFIED COPD TYPE: ICD-10-CM

## 2025-07-21 DIAGNOSIS — Z79.82 LONG-TERM USE OF ASPIRIN THERAPY: ICD-10-CM

## 2025-07-21 DIAGNOSIS — E11.9 INSULIN-REQUIRING OR DEPENDENT TYPE II DIABETES MELLITUS: ICD-10-CM

## 2025-07-21 DIAGNOSIS — K21.9 GASTROESOPHAGEAL REFLUX DISEASE, UNSPECIFIED WHETHER ESOPHAGITIS PRESENT: ICD-10-CM

## 2025-07-21 DIAGNOSIS — F25.0 SCHIZOAFFECTIVE DISORDER, BIPOLAR TYPE: ICD-10-CM

## 2025-07-21 DIAGNOSIS — Z79.899 LONG-TERM USE OF HIGH-RISK MEDICATION: ICD-10-CM

## 2025-07-21 DIAGNOSIS — R35.1 BENIGN PROSTATIC HYPERPLASIA WITH NOCTURIA: ICD-10-CM

## 2025-07-21 DIAGNOSIS — I10 ESSENTIAL HYPERTENSION: ICD-10-CM

## 2025-07-21 DIAGNOSIS — F17.210 NICOTINE DEPENDENCE, CIGARETTES, UNCOMPLICATED: ICD-10-CM

## 2025-07-21 DIAGNOSIS — E03.9 ACQUIRED HYPOTHYROIDISM: ICD-10-CM

## 2025-07-21 DIAGNOSIS — E78.2 MIXED HYPERLIPIDEMIA: ICD-10-CM

## 2025-07-21 DIAGNOSIS — F41.3 OTHER MIXED ANXIETY DISORDERS: ICD-10-CM

## 2025-07-21 DIAGNOSIS — G89.4 CHRONIC PAIN SYNDROME: ICD-10-CM

## 2025-07-21 DIAGNOSIS — N18.32 STAGE 3B CHRONIC KIDNEY DISEASE: ICD-10-CM

## 2025-07-21 DIAGNOSIS — N40.1 BENIGN PROSTATIC HYPERPLASIA WITH NOCTURIA: ICD-10-CM

## 2025-07-21 DIAGNOSIS — Z09 ENCOUNTER FOR FOLLOW-UP: Primary | ICD-10-CM

## 2025-07-21 DIAGNOSIS — E11.69 TYPE 2 DIABETES MELLITUS WITH OTHER SPECIFIED COMPLICATION, WITHOUT LONG-TERM CURRENT USE OF INSULIN: ICD-10-CM

## 2025-07-21 DIAGNOSIS — G47.00 INSOMNIA, UNSPECIFIED TYPE: ICD-10-CM

## 2025-07-21 DIAGNOSIS — N41.1 CHRONIC PROSTATITIS: ICD-10-CM

## 2025-07-21 DIAGNOSIS — N18.31 STAGE 3A CHRONIC KIDNEY DISEASE: ICD-10-CM

## 2025-07-21 DIAGNOSIS — F17.200 SMOKER: ICD-10-CM

## 2025-07-21 DIAGNOSIS — Z72.0 TOBACCO USE: ICD-10-CM

## 2025-07-21 DIAGNOSIS — Z79.899 ON STATIN THERAPY: ICD-10-CM

## 2025-07-21 LAB
ALBUMIN SERPL-MCNC: 4.3 G/DL (ref 3.5–5.2)
ALBUMIN/GLOB SERPL: 1.5 G/DL
ALP SERPL-CCNC: 88 U/L (ref 39–117)
ALT SERPL W P-5'-P-CCNC: 19 U/L (ref 1–41)
ANION GAP SERPL CALCULATED.3IONS-SCNC: 11 MMOL/L (ref 5–15)
AST SERPL-CCNC: 30 U/L (ref 1–40)
BILIRUB SERPL-MCNC: 0.5 MG/DL (ref 0–1.2)
BUN SERPL-MCNC: 18 MG/DL (ref 8–23)
BUN/CREAT SERPL: 9.8 (ref 7–25)
CALCIUM SPEC-SCNC: 9.5 MG/DL (ref 8.6–10.5)
CHLORIDE SERPL-SCNC: 100 MMOL/L (ref 98–107)
CHOLEST SERPL-MCNC: 98 MG/DL (ref 0–200)
CO2 SERPL-SCNC: 25 MMOL/L (ref 22–29)
CREAT SERPL-MCNC: 1.83 MG/DL (ref 0.76–1.27)
DEPRECATED RDW RBC AUTO: 45 FL (ref 37–54)
EGFRCR SERPLBLD CKD-EPI 2021: 40.2 ML/MIN/1.73
ERYTHROCYTE [DISTWIDTH] IN BLOOD BY AUTOMATED COUNT: 12.6 % (ref 12.3–15.4)
GLOBULIN UR ELPH-MCNC: 2.8 GM/DL
GLUCOSE SERPL-MCNC: 90 MG/DL (ref 65–99)
HBA1C MFR BLD: 6.4 % (ref 4.8–5.6)
HCT VFR BLD AUTO: 46.9 % (ref 37.5–51)
HDLC SERPL-MCNC: 28 MG/DL (ref 40–60)
HGB BLD-MCNC: 15.4 G/DL (ref 13–17.7)
LDLC SERPL CALC-MCNC: 54 MG/DL (ref 0–100)
LDLC/HDLC SERPL: 1.96 {RATIO}
MCH RBC QN AUTO: 32 PG (ref 26.6–33)
MCHC RBC AUTO-ENTMCNC: 32.8 G/DL (ref 31.5–35.7)
MCV RBC AUTO: 97.5 FL (ref 79–97)
PLATELET # BLD AUTO: 271 10*3/MM3 (ref 140–450)
PMV BLD AUTO: 12.6 FL (ref 6–12)
POTASSIUM SERPL-SCNC: 3.7 MMOL/L (ref 3.5–5.2)
PROT SERPL-MCNC: 7.1 G/DL (ref 6–8.5)
RBC # BLD AUTO: 4.81 10*6/MM3 (ref 4.14–5.8)
SODIUM SERPL-SCNC: 136 MMOL/L (ref 136–145)
TRIGL SERPL-MCNC: 76 MG/DL (ref 0–150)
TSH SERPL DL<=0.05 MIU/L-ACNC: 0.93 UIU/ML (ref 0.27–4.2)
VLDLC SERPL-MCNC: 16 MG/DL (ref 5–40)
WBC NRBC COR # BLD AUTO: 8.48 10*3/MM3 (ref 3.4–10.8)

## 2025-07-21 PROCEDURE — 1126F AMNT PAIN NOTED NONE PRSNT: CPT | Performed by: NURSE PRACTITIONER

## 2025-07-21 PROCEDURE — G2211 COMPLEX E/M VISIT ADD ON: HCPCS | Performed by: NURSE PRACTITIONER

## 2025-07-21 PROCEDURE — 1159F MED LIST DOCD IN RCRD: CPT | Performed by: NURSE PRACTITIONER

## 2025-07-21 PROCEDURE — 3074F SYST BP LT 130 MM HG: CPT | Performed by: NURSE PRACTITIONER

## 2025-07-21 PROCEDURE — 80053 COMPREHEN METABOLIC PANEL: CPT

## 2025-07-21 PROCEDURE — 99214 OFFICE O/P EST MOD 30 MIN: CPT | Performed by: NURSE PRACTITIONER

## 2025-07-21 PROCEDURE — 1160F RVW MEDS BY RX/DR IN RCRD: CPT | Performed by: NURSE PRACTITIONER

## 2025-07-21 PROCEDURE — 82043 UR ALBUMIN QUANTITATIVE: CPT | Performed by: NURSE PRACTITIONER

## 2025-07-21 PROCEDURE — 80061 LIPID PANEL: CPT

## 2025-07-21 PROCEDURE — 85027 COMPLETE CBC AUTOMATED: CPT

## 2025-07-21 PROCEDURE — 83036 HEMOGLOBIN GLYCOSYLATED A1C: CPT

## 2025-07-21 PROCEDURE — 84443 ASSAY THYROID STIM HORMONE: CPT

## 2025-07-21 PROCEDURE — 3078F DIAST BP <80 MM HG: CPT | Performed by: NURSE PRACTITIONER

## 2025-07-21 PROCEDURE — 82570 ASSAY OF URINE CREATININE: CPT | Performed by: NURSE PRACTITIONER

## 2025-07-21 NOTE — PROGRESS NOTES
Office Note     Name: Carmelo Saucedo    : 1960     MRN: [id number]     Chief Complaint  Follow-up (6 mo fu. /Patient stated at night time he has problems being able to pee.needs diabtetic shoes he has to have a signed order. Want's labs checked and CT scan for his lungs. )    Subjective     History of Present Illness:  Carmelo Saucedo is a 64 y.o. male who presents today for follow-up    Family member was in the room with the patient today during visit    Patient was recently seen by our office on  for urinary issues.  Urinalysis and urine culture were completed at that visit.  Patient was also referred to urology.  - Patient is currently on Flomax    Low-dose CT of the chest was completed 2024 with no significant pulmonary process.  Plan to repeat 1 year  - Patient would appreciate updated order today    Hyperlipidemia: Patient is currently on Lipitor 20 mg and a daily aspirin    Hypertension: Patient is followed with cardiology in the past.  He ideally does not want to follow with cardiology any longer as it was getting too expensive.  Patient is also declined any further testing again this is due to expenses.  - Blood pressure well-controlled in office today    Hypothyroidism: Patient is currently on Synthroid.  Thyroid has not been removed    Diabetes: Patient seldomly monitors his blood sugars.  Again this is due to financial expense.  He is currently on insulin and metformin.  He does have a burning sensation in his feet.  He does wear shoes when he is outside and does do foot checks. last A1c was 7.3      GERD: Patient is currently on Protonix    Patient is on trazodone regarding insomnia.  Patient does not particularly feel the trazodone is working.  He states he does get some next-day grogginess    COPD: Patient is a current smoker of a pack per day for about 40+ years.  He is not interested in quitting.  He does use his inhalers as needed    Patient follows with pain management,  Dr. Stefan Elena.  He is on gabapentin as well as hydrocodone.  He does have chronic pain associated with arthritis in both knees.  He has had 13 surgeries.  He again also has burning sensation in the feet  - Patient states that he does not particularly notify pain management about his symptoms as he states he would prefer to just get into and out of there.  I did offer an updated referral to pain management for second opinion but patient declined    Patient is been on Cymbalta in the past.  Mood is well-controlled.  He has known history of anxiety, depression, schizoaffective bipolar type.  He has been on lithium in the past and did note toxicity.  He ideally does not want a be on the medications at this time.    Patient does have noted chronic kidney disease with altered kidney function.  Last creatinine was 1.71 with an EGFR of 43.6.  Patient was started on Jardiance at previous visit.  He did note yeast infections.  He did stop this medication    Patient's main concern today is related to his kidneys and having updated lab work.  He continues to experience the urinary symptoms of frequency with little output and dribbling.  He is currently on Flomax and taking it at night.  He does have a history of kidney failure with the lithium.  He is currently on metformin and was told by a different provider that they need to consider coming off the metformin.  He has not drink soda in 8 days only consuming water.  He would also appreciate an order for diabetic shoes as well sent over to the prescription pad  He is having a vision exam on Monday          Past Medical History:   Diagnosis Date    Abnormal EKG     Angular cheilitis     Arthritis     Balanitis 08/09/2017    Improved on diflucan and nystatin.      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    H/O tobacco use, presenting hazards to health 5/2/2017    Hematochezia     Herpes simplex infection     Hyperlipidemia     Hypertension     Insomnia     Lower back pain     Migraine without status migrainosus, not intractable 5/2/2017    Nephrolithiasis     Peptic ulcer     Pulmonary nodule     Rectal polyp     Rectal polyp     Sialoadenitis     Skin ulcer of neck     Uncontrolled type 2 diabetes mellitus with complication        Past Surgical History:   Procedure Laterality Date    ADENOIDECTOMY      CHOLECYSTECTOMY      GALLBLADDER SURGERY      KNEE SURGERY Bilateral     release x5    SKIN CANCER DESTRUCTION      TONSILLECTOMY         Social History     Socioeconomic History    Marital status:    Tobacco Use    Smoking status: Every Day     Current packs/day: 2.00     Average packs/day: 2.0 packs/day for 50.0 years (100.0 ttl pk-yrs)     Types: Cigarettes     Passive exposure: Past    Smokeless tobacco: Never   Vaping Use    Vaping status: Never Used   Substance and Sexual Activity    Alcohol use: No    Drug use: No    Sexual activity: Defer         Current Outpatient Medications:     acyclovir (ZOVIRAX) 400 MG tablet, Take 1 tablet by mouth 2 (Two) Times a Day. Take no more than 5 doses a day., Disp: 180 tablet, Rfl: 3    albuterol sulfate  (90 Base) MCG/ACT inhaler, Inhale 2 puffs Every 4 (Four) Hours As Needed for Wheezing or Shortness of Air., Disp: 18 g, Rfl: 1    atorvastatin (LIPITOR) 20 MG tablet, Take 1 tablet by mouth every night at bedtime., Disp: 90 tablet, Rfl: 3    gabapentin (NEURONTIN) 600 MG tablet, Take 1 tablet by mouth Every 6 (Six) Hours., Disp: , Rfl:     glucose blood test strip, Use as instructed to check blood sugar three times daily, Disp: 200 each, Rfl: 6    glucose monitor monitoring kit, 1 each Daily. Patient requests One Touch Glucose Meter.  Please substitute covered meter per insurance if  "needed., Disp: 1 each, Rfl: 0    hydroCHLOROthiazide (MICROZIDE) 12.5 MG capsule, Take 1 capsule by mouth Daily., Disp: 90 capsule, Rfl: 3    HYDROcodone-acetaminophen (NORCO) 7.5-325 MG per tablet, Take 1 tablet by mouth 3 (Three) Times a Day., Disp: , Rfl: 0    insulin detemir (Levemir FlexPen) 100 UNIT/ML injection, Inject 10 Units under the skin into the appropriate area as directed Every Night., Disp: 9 mL, Rfl: 0    Insulin Pen Needle (Easy Comfort Pen Needles) 31G X 5 MM misc, Apply 1 each topically Every Night., Disp: 100 each, Rfl: 11    ipratropium-albuterol (Combivent Respimat)  MCG/ACT inhaler, Inhale 1 puff 4 (Four) Times a Day., Disp: 4 g, Rfl: 5    levothyroxine (SYNTHROID, LEVOTHROID) 88 MCG tablet, Take 1 tablet by mouth Every Morning Before Breakfast., Disp: 90 tablet, Rfl: 3    losartan (COZAAR) 25 MG tablet, Take 1 tablet by mouth Daily. Replaces lisinopril, Disp: 90 tablet, Rfl: 3    metFORMIN (GLUCOPHAGE) 1000 MG tablet, Take 1 tablet by mouth 2 (Two) Times a Day With Meals., Disp: 180 tablet, Rfl: 3    metoprolol succinate XL (TOPROL-XL) 50 MG 24 hr tablet, Take 1 tablet by mouth Daily., Disp: 90 tablet, Rfl: 3    pantoprazole (PROTONIX) 40 MG EC tablet, Take 1 tablet by mouth Daily., Disp: 90 tablet, Rfl: 3    sulfamethoxazole-trimethoprim (Bactrim DS) 800-160 MG per tablet, Take 1 tablet by mouth 2 (Two) Times a Day for 14 days., Disp: 28 tablet, Rfl: 0    tamsulosin (FLOMAX) 0.4 MG capsule 24 hr capsule, Take 2 capsules by mouth every night at bedtime., Disp: 180 capsule, Rfl: 3    traZODone (DESYREL) 50 MG tablet, Take 1 tablet by mouth Every Night., Disp: 90 tablet, Rfl: 3    aspirin 81 MG chewable tablet, Chew 1 tablet Daily. (Patient not taking: Reported on 7/21/2025), Disp: 90 tablet, Rfl: 3    Objective     Vital Signs  /68 (BP Location: Left arm, Patient Position: Sitting, Cuff Size: Adult)   Pulse 73   Temp 98 °F (36.7 °C)   Ht 185.4 cm (73\")   Wt 88.4 kg (194 lb " "12.8 oz)   SpO2 96%   BMI 25.70 kg/m²   Estimated body mass index is 25.7 kg/m² as calculated from the following:    Height as of this encounter: 185.4 cm (73\").    Weight as of this encounter: 88.4 kg (194 lb 12.8 oz).    BMI is >= 25 and <30. (Overweight) The following options were offered after discussion;: exercise counseling/recommendations and nutrition counseling/recommendations             Physical Exam  Vitals and nursing note reviewed.   Constitutional:       Appearance: Normal appearance.   HENT:      Head: Normocephalic and atraumatic.   Eyes:      Extraocular Movements: Extraocular movements intact.      Pupils: Pupils are equal, round, and reactive to light.   Cardiovascular:      Rate and Rhythm: Normal rate and regular rhythm.      Heart sounds: Normal heart sounds.   Pulmonary:      Effort: Pulmonary effort is normal.      Breath sounds: Normal breath sounds.   Musculoskeletal:         General: Normal range of motion.   Skin:     General: Skin is warm and dry.   Neurological:      Mental Status: He is alert and oriented to person, place, and time.   Psychiatric:         Mood and Affect: Mood normal.         Behavior: Behavior normal.                 Assessment and Plan     Diagnoses and all orders for this visit:    1. Encounter for follow-up (Primary)    2. Chronic prostatitis    3. Benign prostatic hyperplasia with nocturia    4. Mixed hyperlipidemia  -     Lipid Panel; Future    5. On statin therapy    6. Long-term use of aspirin therapy    7. Essential hypertension    8. Type 2 diabetes mellitus with other specified complication, without long-term current use of insulin  -     CBC (No Diff); Future  -     Comprehensive Metabolic Panel; Future  -     Hemoglobin A1c; Future  -     Microalbumin / Creatinine Urine Ratio - Urine, Clean Catch; Future  -     Microalbumin / Creatinine Urine Ratio - Urine, Clean Catch    9. Insulin-requiring or dependent type II diabetes mellitus    10. Acquired " hypothyroidism  -     TSH Rfx On Abnormal To Free T4; Future    11. Gastroesophageal reflux disease, unspecified whether esophagitis present    12. Insomnia, unspecified type    13. Chronic obstructive pulmonary disease, unspecified COPD type    14. Tobacco use  -      CT Chest Low Dose Cancer Screening WO; Future    15. Smoker  -      CT Chest Low Dose Cancer Screening WO; Future    16. Chronic pain syndrome    17. Long-term use of high-risk medication    18. Other mixed anxiety disorders    19. Schizoaffective disorder, bipolar type    20. Stage 3a chronic kidney disease    21. Nicotine dependence, cigarettes, uncomplicated  -      CT Chest Low Dose Cancer Screening WO; Future    Plan  Discussed with patient that we will order updated blood work today.  He will have this completed today and be notified of results.  Last prostate level was in December.  Will plan to reorder at December visit    Updated CT of the chest was also ordered per patient request    Patient did not note any particular refills that were needed today    I did encourage patient to consider taking the Flomax in the morning.  Decrease fluid intake about 2 hours before bedtime.  I do feel would be beneficial to go to urology for further evaluation    Also encourage patient to call Medicare to see what medications will be covered regarding his diabetes.  He will decrease his metformin to 1000 mg once daily.  He will also stop the hydrochlorothiazide due to his altered kidney function  - Patient tried and failed Jardiance due to side effects    Continue to follow with pain management    Go to ER if any condition worsens or severe    We will plan to follow-up as scheduled in December    Please contact the office once you have had your vision exam completed and spoken with insurance about coverage of medication for your diabetes    Follow Up  Return for Next scheduled follow up.    RADHA Szymanski    Part of this note may be an electronic  transcription/translation of spoken language to printed text using the Dragon Dictation System.

## 2025-07-22 LAB
ALBUMIN UR-MCNC: <1.2 MG/DL
CREAT UR-MCNC: 62.3 MG/DL
MICROALBUMIN/CREAT UR: NORMAL MG/G{CREAT}

## 2025-08-04 ENCOUNTER — OFFICE VISIT (OUTPATIENT)
Dept: UROLOGY | Facility: CLINIC | Age: 65
End: 2025-08-04
Payer: MEDICARE

## 2025-08-04 VITALS — HEIGHT: 73 IN | BODY MASS INDEX: 25.71 KG/M2 | WEIGHT: 194 LBS

## 2025-08-04 DIAGNOSIS — N41.0 ACUTE PROSTATITIS: ICD-10-CM

## 2025-08-04 DIAGNOSIS — R35.0 URINARY FREQUENCY: ICD-10-CM

## 2025-08-04 DIAGNOSIS — N40.1 BENIGN PROSTATIC HYPERPLASIA WITH NOCTURIA: Primary | ICD-10-CM

## 2025-08-04 DIAGNOSIS — R35.1 BENIGN PROSTATIC HYPERPLASIA WITH NOCTURIA: Primary | ICD-10-CM

## 2025-08-04 DIAGNOSIS — N32.81 OVERACTIVE BLADDER: ICD-10-CM

## 2025-08-04 LAB
BILIRUB BLD-MCNC: NEGATIVE MG/DL
CLARITY, POC: CLEAR
COLOR UR: YELLOW
EXPIRATION DATE: NORMAL
GLUCOSE UR STRIP-MCNC: NEGATIVE MG/DL
KETONES UR QL: NEGATIVE
LEUKOCYTE EST, POC: NEGATIVE
Lab: NORMAL
NITRITE UR-MCNC: NEGATIVE MG/ML
PH UR: 6 [PH] (ref 5–8)
PROT UR STRIP-MCNC: NEGATIVE MG/DL
RBC # UR STRIP: NEGATIVE /UL
SP GR UR: 1.01 (ref 1–1.03)
UROBILINOGEN UR QL: NORMAL

## 2025-08-04 PROCEDURE — 1160F RVW MEDS BY RX/DR IN RCRD: CPT | Performed by: NURSE PRACTITIONER

## 2025-08-04 PROCEDURE — 1159F MED LIST DOCD IN RCRD: CPT | Performed by: NURSE PRACTITIONER

## 2025-08-04 PROCEDURE — 81003 URINALYSIS AUTO W/O SCOPE: CPT | Performed by: NURSE PRACTITIONER

## 2025-08-04 PROCEDURE — 99214 OFFICE O/P EST MOD 30 MIN: CPT | Performed by: NURSE PRACTITIONER

## 2025-08-04 PROCEDURE — 51798 US URINE CAPACITY MEASURE: CPT | Performed by: NURSE PRACTITIONER

## 2025-08-07 ENCOUNTER — RESULTS FOLLOW-UP (OUTPATIENT)
Dept: UROLOGY | Facility: CLINIC | Age: 65
End: 2025-08-07
Payer: MEDICARE

## 2025-08-11 ENCOUNTER — TELEPHONE (OUTPATIENT)
Dept: UROLOGY | Facility: CLINIC | Age: 65
End: 2025-08-11
Payer: MEDICARE

## 2025-08-11 DIAGNOSIS — B95.7 COAGULASE-NEGATIVE STAPHYLOCOCCAL INFECTION: Primary | ICD-10-CM

## 2025-08-11 DIAGNOSIS — N41.0 ACUTE PROSTATITIS: ICD-10-CM

## 2025-08-11 RX ORDER — DOXYCYCLINE 100 MG/1
100 CAPSULE ORAL 2 TIMES DAILY
Qty: 20 CAPSULE | Refills: 0 | Status: SHIPPED | OUTPATIENT
Start: 2025-08-11 | End: 2025-08-21